# Patient Record
Sex: FEMALE | Race: BLACK OR AFRICAN AMERICAN | Employment: OTHER | URBAN - METROPOLITAN AREA
[De-identification: names, ages, dates, MRNs, and addresses within clinical notes are randomized per-mention and may not be internally consistent; named-entity substitution may affect disease eponyms.]

---

## 2017-04-13 ENCOUNTER — HOSPITAL ENCOUNTER (EMERGENCY)
Age: 52
Discharge: HOME | End: 2017-04-14
Attending: EMERGENCY MEDICINE
Payer: SELF-PAY

## 2017-04-13 ENCOUNTER — APPOINTMENT (OUTPATIENT)
Dept: GENERAL RADIOLOGY | Age: 52
End: 2017-04-13
Attending: EMERGENCY MEDICINE
Payer: SELF-PAY

## 2017-04-13 VITALS
HEART RATE: 90 BPM | SYSTOLIC BLOOD PRESSURE: 130 MMHG | RESPIRATION RATE: 16 BRPM | BODY MASS INDEX: 25.69 KG/M2 | TEMPERATURE: 98 F | WEIGHT: 145 LBS | DIASTOLIC BLOOD PRESSURE: 78 MMHG

## 2017-04-13 DIAGNOSIS — M54.40 LOW BACK PAIN WITH SCIATICA, SCIATICA LATERALITY UNSPECIFIED, UNSPECIFIED BACK PAIN LATERALITY, UNSPECIFIED CHRONICITY: Primary | ICD-10-CM

## 2017-04-13 PROCEDURE — 72110 X-RAY EXAM L-2 SPINE 4/>VWS: CPT

## 2017-04-13 PROCEDURE — 99283 EMERGENCY DEPT VISIT LOW MDM: CPT

## 2017-04-13 RX ORDER — IBUPROFEN 600 MG/1
600 TABLET ORAL
Qty: 30 TAB | Refills: 0 | Status: SHIPPED | OUTPATIENT
Start: 2017-04-13 | End: 2018-07-31

## 2017-04-13 RX ORDER — PROPRANOLOL HYDROCHLORIDE 20 MG/1
TABLET ORAL 3 TIMES DAILY
COMMUNITY
End: 2018-07-31

## 2017-04-13 RX ORDER — OXYCODONE AND ACETAMINOPHEN 5; 325 MG/1; MG/1
TABLET ORAL
Qty: 15 TAB | Refills: 0 | Status: SHIPPED | OUTPATIENT
Start: 2017-04-13 | End: 2018-07-31

## 2017-04-14 PROCEDURE — 74011250637 HC RX REV CODE- 250/637: Performed by: EMERGENCY MEDICINE

## 2017-04-14 RX ORDER — IBUPROFEN 600 MG/1
600 TABLET ORAL
Status: COMPLETED | OUTPATIENT
Start: 2017-04-14 | End: 2017-04-14

## 2017-04-14 RX ORDER — OXYCODONE AND ACETAMINOPHEN 5; 325 MG/1; MG/1
1 TABLET ORAL
Status: COMPLETED | OUTPATIENT
Start: 2017-04-14 | End: 2017-04-14

## 2017-04-14 RX ADMIN — OXYCODONE HYDROCHLORIDE AND ACETAMINOPHEN 1 TABLET: 5; 325 TABLET ORAL at 00:22

## 2017-04-14 RX ADMIN — IBUPROFEN 600 MG: 400 TABLET, FILM COATED ORAL at 00:22

## 2017-04-14 NOTE — ED TRIAGE NOTES
Patient c/o back pain x 3 days. Patient reports prior back injury and previous back surgery in 2012.

## 2017-04-14 NOTE — ED PROVIDER NOTES
HPI Comments: 10:47 PM Ranjan Mejía is a 47 y.o. female with hx of kidney stone, stroke, asthma, arthritis, degenerative disc disease, and lumbar fusion who presents to the ED c/o back pain onset yesterday after heavy lifting. Pt with hx of chronic back pain since age 14. Pt denies any chance of pregnancy, urinary sx, or any other sx at this time. The history is provided by the patient. No  was used. Past Medical History:   Diagnosis Date    Appetite loss 2013    Arthritis     in back    Asthma 2006    Back pain 8/31/2012    Chest pain     Degenerative disc disease, lumbar     DUB (dysfunctional uterine bleeding)     Foot pain, left     GERD (gastroesophageal reflux disease)     Headache     migraine variant    Irregular heartbeat     Kidney calculus     Kidney stone     Menorrhagia     Pelvic pain in female     Postlaminectomy syndrome     S/P ankle ligament repair, left 2/7/2014    Stroke ([facility] [address] 1988, 1996, 1998    x3 - no residual    Tobacco abuse     Trichomonas     UTI (lower urinary tract infection)     Wears glasses 2007       Past Surgical History:   Procedure Laterality Date    HX LUMBAR FUSION  2012    L4-L5    HX ORTHOPAEDIC Left 02/07/2014    ligament reconstruction    HX TONSILLECTOMY      HX TUBAL LIGATION  1996    x2         Family History:   Problem Relation Age of Onset    Heart Disease Mother     Hypertension Mother     Stroke Mother     Hypertension Father     Hypertension Sister     Diabetes Sister        Social History     Social History    Marital status:      Spouse name: N/A    Number of children: N/A    Years of education: N/A     Occupational History    Not on file.      Social History Main Topics    Smoking status: Current Every Day Smoker     Packs/day: 0.00     Years: 33.00     Last attempt to quit: 11/30/2013    Smokeless tobacco: Never Used    Alcohol use No    Drug use: No    Sexual activity: Yes Partners: Male     Birth control/ protection: Surgical     Other Topics Concern    Not on file     Social History Narrative         ALLERGIES: Review of patient's allergies indicates no known allergies. Review of Systems   Constitutional: Negative for chills, fatigue and fever. HENT: Negative for congestion, rhinorrhea and sore throat. Eyes: Negative for visual disturbance. Respiratory: Negative for cough and shortness of breath. Cardiovascular: Negative for chest pain and palpitations. Gastrointestinal: Negative for abdominal pain, diarrhea, nausea and vomiting. Genitourinary: Negative for dysuria, hematuria and urgency. Musculoskeletal: Positive for back pain. Negative for arthralgias and myalgias. Skin: Negative for rash and wound. Neurological: Negative for dizziness and headaches. Psychiatric/Behavioral: The patient is not nervous/anxious. All other systems reviewed and are negative. Vitals:    04/13/17 2139   BP: 130/78   Pulse: 90   Resp: 16   Temp: 98 °F (36.7 °C)   Weight: 65.8 kg (145 lb)            Physical Exam   Constitutional: She is oriented to person, place, and time. She appears well-developed and well-nourished. No distress. HENT:   Head: Normocephalic. Right Ear: External ear normal.   Left Ear: External ear normal.   Mouth/Throat: No oropharyngeal exudate. Eyes: Conjunctivae and EOM are normal. Pupils are equal, round, and reactive to light. Right eye exhibits no discharge. Left eye exhibits no discharge. No scleral icterus. Neck: Normal range of motion. Neck supple. No JVD present. No tracheal deviation present. No thyromegaly present. Cardiovascular: Normal rate, regular rhythm, normal heart sounds and intact distal pulses. Exam reveals no gallop and no friction rub. No murmur heard. Pulmonary/Chest: Effort normal and breath sounds normal. No stridor. No respiratory distress. She has no wheezes. She has no rales. She exhibits no tenderness. Abdominal: Soft. Bowel sounds are normal. She exhibits no distension and no mass. There is no tenderness. There is no rebound and no guarding. Musculoskeletal: Normal range of motion. She exhibits no edema or tenderness. Lymphadenopathy:     She has no cervical adenopathy. Neurological: She is alert and oriented to person, place, and time. She displays normal reflexes. No cranial nerve deficit. She exhibits normal muscle tone. Coordination normal.   Negative SLR bilaterally   Skin: Skin is warm and dry. No rash noted. She is not diaphoretic. No erythema. No pallor. Nursing note and vitals reviewed. MDM  Number of Diagnoses or Management Options  Low back pain with sciatica, sciatica laterality unspecified, unspecified back pain laterality, unspecified chronicity:      Amount and/or Complexity of Data Reviewed  Tests in the radiology section of CPT®: ordered and reviewed    Risk of Complications, Morbidity, and/or Mortality  Presenting problems: moderate  Diagnostic procedures: moderate  Management options: moderate    Patient Progress  Patient progress: stable    ED Course       Procedures    Vitals:  Patient Vitals for the past 12 hrs:   Temp Pulse Resp BP   04/13/17 2139 98 °F (36.7 °C) 90 16 130/78       Medications ordered:   Medications - No data to display      Lab findings:  No results found for this or any previous visit (from the past 12 hour(s)). EKG interpretation by ED Physician:      X-Ray, CT or other radiology findings or impressions:  XR SPINE LUMB MIN 4 V    (Results Pending)  No acute injury per Dr. Celsa Garza. Progress notes, Consult notes or additional Procedure notes:       Reevaluation of patient:   11:48 PM I have reevaluated the patient. Patient is feeling better. Reviewed all results with pt and pt agrees with plan for discharge and appropriate follow up. All questions answered at this time. Patient was discharged in stable condition.  Patient is to return to emergency department for any new or worsening condition. Disposition:  Diagnosis:   1. Low back pain with sciatica, sciatica laterality unspecified, unspecified back pain laterality, unspecified chronicity        Disposition: Discharged    Follow-up Information     None            Patient's Medications   Start Taking    No medications on file   Continue Taking    ALBUTEROL (PROVENTIL HFA, VENTOLIN HFA) 90 MCG/ACTUATION INHALER    Take 2 Puffs by inhalation every six (6) hours as needed. CETIRIZINE (ZYRTEC) 10 MG TABLET    Take 1 Tab by mouth daily. FLUTICASONE (FLOVENT HFA) 44 MCG/ACTUATION INHALER    Take 1 Puff by inhalation two (2) times a day. PROPRANOLOL (INDERAL) 20 MG TABLET    Take  by mouth three (3) times daily. RABEPRAZOLE (ACIPHEX) 20 MG TABLET    Take 20 mg by mouth daily. These Medications have changed    No medications on file   Stop Taking    NAPROXEN (NAPROSYN) 500 MG TABLET    Take 1 Tab by mouth two (2) times daily (with meals). PROPRANOLOL (INDERAL) 20 MG TABLET    Take 1 Tab by mouth three (3) times daily. TRAMADOL (ULTRAM) 50 MG TABLET    Take 1 Tab by mouth every eight (8) hours as needed for Pain. Max Daily Amount: 150 mg.          Scribe Attestation:   Indra Meza acting as a scribe for and in the presence of Sudeep Child MD April 13, 2017 at 10:43 PM     Signed by: Phillip Lawrence, April 13, 2017, 10:43 PM    Provider Attestation:   I personally performed the services described in the documentation, reviewed the documentation, as recorded by the scribe in my presence, and it accurately and completely records my words and actions.      Reviewed and signed by:  Sudeep Child MD

## 2017-04-14 NOTE — DISCHARGE INSTRUCTIONS
Back Pain: Care Instructions  Your Care Instructions    Back pain has many possible causes. It is often related to problems with muscles and ligaments of the back. It may also be related to problems with the nerves, discs, or bones of the back. Moving, lifting, standing, sitting, or sleeping in an awkward way can strain the back. Sometimes you don't notice the injury until later. Arthritis is another common cause of back pain. Although it may hurt a lot, back pain usually improves on its own within several weeks. Most people recover in 12 weeks or less. Using good home treatment and being careful not to stress your back can help you feel better sooner. Follow-up care is a key part of your treatment and safety. Be sure to make and go to all appointments, and call your doctor if you are having problems. Its also a good idea to know your test results and keep a list of the medicines you take. How can you care for yourself at home? · Sit or lie in positions that are most comfortable and reduce your pain. Try one of these positions when you lie down:  ¨ Lie on your back with your knees bent and supported by large pillows. ¨ Lie on the floor with your legs on the seat of a sofa or chair. Juan Manuel Rosey on your side with your knees and hips bent and a pillow between your legs. ¨ Lie on your stomach if it does not make pain worse. · Do not sit up in bed, and avoid soft couches and twisted positions. Bed rest can help relieve pain at first, but it delays healing. Avoid bed rest after the first day of back pain. · Change positions every 30 minutes. If you must sit for long periods of time, take breaks from sitting. Get up and walk around, or lie in a comfortable position. · Try using a heating pad on a low or medium setting for 15 to 20 minutes every 2 or 3 hours. Try a warm shower in place of one session with the heating pad. · You can also try an ice pack for 10 to 15 minutes every 2 to 3 hours.  Put a thin cloth between the ice pack and your skin. · Take pain medicines exactly as directed. ¨ If the doctor gave you a prescription medicine for pain, take it as prescribed. ¨ If you are not taking a prescription pain medicine, ask your doctor if you can take an over-the-counter medicine. · Take short walks several times a day. You can start with 5 to 10 minutes, 3 or 4 times a day, and work up to longer walks. Walk on level surfaces and avoid hills and stairs until your back is better. · Return to work and other activities as soon as you can. Continued rest without activity is usually not good for your back. · To prevent future back pain, do exercises to stretch and strengthen your back and stomach. Learn how to use good posture, safe lifting techniques, and proper body mechanics. When should you call for help? Call your doctor now or seek immediate medical care if:  · You have new or worsening numbness in your legs. · You have new or worsening weakness in your legs. (This could make it hard to stand up.)  · You lose control of your bladder or bowels. Watch closely for changes in your health, and be sure to contact your doctor if:  · Your pain gets worse. · You are not getting better after 2 weeks. Where can you learn more? Go to http://ilsa-bolivar.info/. Enter P006 in the search box to learn more about \"Back Pain: Care Instructions. \"  Current as of: May 23, 2016  Content Version: 11.2  © 8886-4465 [employer]. Care instructions adapted under license by [employer] (which disclaims liability or warranty for this information). If you have questions about a medical condition or this instruction, always ask your healthcare professional. [address] any warranty or liability for your use of this information.

## 2017-07-26 ENCOUNTER — HOSPITAL ENCOUNTER (EMERGENCY)
Age: 52
Discharge: HOME | End: 2017-07-26
Attending: EMERGENCY MEDICINE
Payer: SELF-PAY

## 2017-07-26 VITALS
DIASTOLIC BLOOD PRESSURE: 91 MMHG | OXYGEN SATURATION: 100 % | HEART RATE: 95 BPM | SYSTOLIC BLOOD PRESSURE: 138 MMHG | WEIGHT: 150 LBS | TEMPERATURE: 98.1 F | HEIGHT: 63 IN | RESPIRATION RATE: 18 BRPM | BODY MASS INDEX: 26.58 KG/M2

## 2017-07-26 DIAGNOSIS — R10.84 ABDOMINAL PAIN, GENERALIZED: Primary | ICD-10-CM

## 2017-07-26 LAB
ANION GAP BLD CALC-SCNC: 7 MMOL/L (ref 3–18)
BASOPHILS # BLD AUTO: 0 K/UL (ref 0–0.1)
BASOPHILS # BLD: 0 % (ref 0–2)
BUN SERPL-MCNC: 12 MG/DL (ref 7–18)
BUN/CREAT SERPL: 17 (ref 12–20)
CALCIUM SERPL-MCNC: 9.4 MG/DL (ref 8.5–10.1)
CHLORIDE SERPL-SCNC: 109 MMOL/L (ref 100–108)
CO2 SERPL-SCNC: 24 MMOL/L (ref 21–32)
CREAT SERPL-MCNC: 0.69 MG/DL (ref 0.6–1.3)
DIFFERENTIAL METHOD BLD: NORMAL
EOSINOPHIL # BLD: 0.1 K/UL (ref 0–0.4)
EOSINOPHIL NFR BLD: 2 % (ref 0–5)
ERYTHROCYTE [DISTWIDTH] IN BLOOD BY AUTOMATED COUNT: 13.8 % (ref 11.6–14.5)
GLUCOSE SERPL-MCNC: 105 MG/DL (ref 74–99)
HCT VFR BLD AUTO: 42.8 % (ref 35–45)
HGB BLD-MCNC: 14.7 G/DL (ref 12–16)
LYMPHOCYTES # BLD AUTO: 30 % (ref 21–52)
LYMPHOCYTES # BLD: 2.5 K/UL (ref 0.9–3.6)
MCH RBC QN AUTO: 31.7 PG (ref 24–34)
MCHC RBC AUTO-ENTMCNC: 34.3 G/DL (ref 31–37)
MCV RBC AUTO: 92.4 FL (ref 74–97)
MONOCYTES # BLD: 0.5 K/UL (ref 0.05–1.2)
MONOCYTES NFR BLD AUTO: 6 % (ref 3–10)
NEUTS SEG # BLD: 5.2 K/UL (ref 1.8–8)
NEUTS SEG NFR BLD AUTO: 62 % (ref 40–73)
PLATELET # BLD AUTO: 185 K/UL (ref 135–420)
PMV BLD AUTO: 10.4 FL (ref 9.2–11.8)
POTASSIUM SERPL-SCNC: 3.5 MMOL/L (ref 3.5–5.5)
RBC # BLD AUTO: 4.63 M/UL (ref 4.2–5.3)
SODIUM SERPL-SCNC: 140 MMOL/L (ref 136–145)
WBC # BLD AUTO: 8.2 K/UL (ref 4.6–13.2)

## 2017-07-26 PROCEDURE — 85025 COMPLETE CBC W/AUTO DIFF WBC: CPT | Performed by: PHYSICIAN ASSISTANT

## 2017-07-26 PROCEDURE — 99283 EMERGENCY DEPT VISIT LOW MDM: CPT

## 2017-07-26 PROCEDURE — 80048 BASIC METABOLIC PNL TOTAL CA: CPT | Performed by: PHYSICIAN ASSISTANT

## 2017-07-26 PROCEDURE — 74011250637 HC RX REV CODE- 250/637: Performed by: PHYSICIAN ASSISTANT

## 2017-07-26 RX ORDER — HYDROCODONE BITARTRATE AND ACETAMINOPHEN 5; 325 MG/1; MG/1
1 TABLET ORAL
Status: COMPLETED | OUTPATIENT
Start: 2017-07-26 | End: 2017-07-26

## 2017-07-26 RX ADMIN — HYDROCODONE BITARTRATE AND ACETAMINOPHEN 1 TABLET: 5; 325 TABLET ORAL at 12:26

## 2017-07-26 NOTE — DISCHARGE INSTRUCTIONS

## 2017-07-26 NOTE — ED TRIAGE NOTES
C/o abdominal pain X 2 days. Denies N/V.  States \" I had diarrhea on Monday and I took Imodium for it, but my stomach still hurts\"

## 2017-07-26 NOTE — ED PROVIDER NOTES
HPI Comments: 11:27 AM Lizbet Bradshaw is a 47 y.o. female with h/o GERD, UTI, kidney stone, and stroke who presents to ED complaining of sharp, diffuse abdominal pain onset 2 days ago. Patient states she had diarrhea 2 days ago, but has not had any since then. She took Imodium for her diarrhea, but came to ED for persisting abdominal pain. Patient has not taken anything for her pain. No other concerns or symptoms at this time. PCP: Evan Camarena NP    The history is provided by the patient. Past Medical History:   Diagnosis Date    Appetite loss 2013    Arthritis     in back    Asthma 2006    Back pain 8/31/2012    Chest pain     Degenerative disc disease, lumbar     DUB (dysfunctional uterine bleeding)     Foot pain, left     GERD (gastroesophageal reflux disease)     Headache     migraine variant    Irregular heartbeat     Kidney calculus     Kidney stone     Menorrhagia     Pelvic pain in female     Postlaminectomy syndrome     S/P ankle ligament repair, left 2/7/2014    Stroke ([facility] [address] 1988, 1996, 1998    x3 - no residual    Tobacco abuse     Trichomonas     UTI (lower urinary tract infection)     Wears glasses 2007       Past Surgical History:   Procedure Laterality Date    HX LUMBAR FUSION  2012    L4-L5    HX ORTHOPAEDIC Left 02/07/2014    ligament reconstruction    HX TONSILLECTOMY      HX TUBAL LIGATION  1996    x2         Family History:   Problem Relation Age of Onset    Heart Disease Mother     Hypertension Mother     Stroke Mother     Hypertension Father     Hypertension Sister     Diabetes Sister        Social History     Social History    Marital status:      Spouse name: N/A    Number of children: N/A    Years of education: N/A     Occupational History    Not on file.      Social History Main Topics    Smoking status: Current Every Day Smoker     Packs/day: 0.00     Years: 33.00     Last attempt to quit: 11/30/2013    Smokeless tobacco: Never Used  Alcohol use No    Drug use: No    Sexual activity: Yes     Partners: Male     Birth control/ protection: Surgical     Other Topics Concern    Not on file     Social History Narrative         ALLERGIES: Review of patient's allergies indicates no known allergies. Review of Systems   Constitutional: Negative for chills and fever. HENT: Negative for congestion, rhinorrhea and sore throat. Respiratory: Negative for cough and shortness of breath. Cardiovascular: Negative for chest pain and leg swelling. Gastrointestinal: Positive for abdominal pain and diarrhea. Negative for nausea and vomiting. Genitourinary: Negative for dysuria and frequency. Musculoskeletal: Negative for arthralgias, back pain and myalgias. Skin: Negative for rash and wound. Neurological: Negative for dizziness, numbness and headaches. All other systems reviewed and are negative. Vitals:    07/26/17 1034 07/26/17 1116   BP: (!) 138/91    Pulse: 95    Resp: 18    Temp: 98.1 °F (36.7 °C)    SpO2: 100%    Weight:  68 kg (150 lb)   Height:  5' 3\" (1.6 m)            Physical Exam   Constitutional: She is oriented to person, place, and time. She appears well-developed and well-nourished. No distress. HENT:   Head: Normocephalic and atraumatic. Eyes: Conjunctivae are normal.   Neck: Normal range of motion. Neck supple. Cardiovascular: Normal rate, regular rhythm and normal heart sounds. Pulmonary/Chest: Effort normal and breath sounds normal. No respiratory distress. She has no wheezes. She has no rales. Abdominal: Soft. She exhibits no distension. There is no tenderness. There is no rebound and no guarding. Musculoskeletal: Normal range of motion. Neurological: She is alert and oriented to person, place, and time. Skin: Skin is warm and dry. Psychiatric: She has a normal mood and affect. Her behavior is normal. Judgment and thought content normal.   Nursing note and vitals reviewed.        MDM  Number of Diagnoses or Management Options  Abdominal pain, generalized:     ED Course       Procedures    Vitals:  Patient Vitals for the past 12 hrs:   Temp Pulse Resp BP SpO2   07/26/17 1034 98.1 °F (36.7 °C) 95 18 (!) 138/91 100 %   SpO2 reviewed and within normal limits. Medications ordered:   Medications - No data to display      Lab findings:  No results found for this or any previous visit (from the past 12 hour(s)). EKG interpretation by ED Physician:       X-Ray, CT or other radiology findings or impressions:  No results found. Orders:  No orders of the defined types were placed in this encounter. Reevaluation, Progress notes, Consult notes, or additional Procedure notes:       Disposition:  Diagnosis: No diagnosis found. Disposition:     Follow-up Information     None           Patient's Medications   Start Taking    No medications on file   Continue Taking    ALBUTEROL (PROVENTIL HFA, VENTOLIN HFA) 90 MCG/ACTUATION INHALER    Take 2 Puffs by inhalation every six (6) hours as needed. CETIRIZINE (ZYRTEC) 10 MG TABLET    Take 1 Tab by mouth daily. FLUTICASONE (FLOVENT HFA) 44 MCG/ACTUATION INHALER    Take 1 Puff by inhalation two (2) times a day. IBUPROFEN (MOTRIN) 600 MG TABLET    Take 1 Tab by mouth every six (6) hours as needed for Pain. OXYCODONE-ACETAMINOPHEN (PERCOCET) 5-325 MG PER TABLET    Take 1 tablet every 4-6 hours as needed for pain control. If you were instructed to try over the counter ibuprofen or tylenol, only take the percocet for pain not controlled with the over the counter medication. PROPRANOLOL (INDERAL) 20 MG TABLET    Take  by mouth three (3) times daily. RABEPRAZOLE (ACIPHEX) 20 MG TABLET    Take 20 mg by mouth daily.      These Medications have changed    No medications on file   Stop Taking    No medications on file         [id number] [facility] for and in the presence of Nabil Hernandez, [address] Lory Kingsley (07/26/17)      Physician Attestation  I personally performed the services described in this documentation, reviewed and edited the documentation which was dictated to the scribe in my presence, and it accurately records my words and actions. Dakota Schreiber (07/26/17)      Signed by: Phillip Fisher, July 26, 2017 at 11:27 AM        -------------------------------------------------------------------------------------------------------------------     EKG INTERPRETATIONS:      RADIOLOGY RESULTS:   No orders to display       LABORATORY RESULTS:  Recent Results (from the past 12 hour(s))   CBC WITH AUTOMATED DIFF    Collection Time: 07/26/17 12:20 PM   Result Value Ref Range    WBC 8.2 4.6 - 13.2 K/uL    RBC 4.63 4.20 - 5.30 M/uL    HGB 14.7 12.0 - 16.0 g/dL    HCT 42.8 35.0 - 45.0 %    MCV 92.4 74.0 - 97.0 FL    MCH 31.7 24.0 - 34.0 PG    MCHC 34.3 31.0 - 37.0 g/dL    RDW 13.8 11.6 - 14.5 %    PLATELET 564 288 - 045 K/uL    MPV 10.4 9.2 - 11.8 FL    NEUTROPHILS 62 40 - 73 %    LYMPHOCYTES 30 21 - 52 %    MONOCYTES 6 3 - 10 %    EOSINOPHILS 2 0 - 5 %    BASOPHILS 0 0 - 2 %    ABS. NEUTROPHILS 5.2 1.8 - 8.0 K/UL    ABS. LYMPHOCYTES 2.5 0.9 - 3.6 K/UL    ABS. MONOCYTES 0.5 0.05 - 1.2 K/UL    ABS. EOSINOPHILS 0.1 0.0 - 0.4 K/UL    ABS. BASOPHILS 0.0 0.0 - 0.1 K/UL    DF AUTOMATED     METABOLIC PANEL, BASIC    Collection Time: 07/26/17 12:20 PM   Result Value Ref Range    Sodium 140 136 - 145 mmol/L    Potassium 3.5 3.5 - 5.5 mmol/L    Chloride 109 (H) 100 - 108 mmol/L    CO2 24 21 - 32 mmol/L    Anion gap 7 3.0 - 18 mmol/L    Glucose 105 (H) 74 - 99 mg/dL    BUN 12 7.0 - 18 MG/DL    Creatinine 0.69 0.6 - 1.3 MG/DL    BUN/Creatinine ratio 17 12 - 20      GFR est AA >60 >60 ml/min/1.73m2    GFR est non-AA >60 >60 ml/min/1.73m2    Calcium 9.4 8.5 - 10.1 MG/DL           CONSULTATIONS:        PROGRESS NOTES:    1:48 PM Pt well appearing and in NAD. No abdominal tenderness noted on my exam. Labs unremarkable.  Will d/h to f/u with PCP For further eval. Lengthy D/W pt regarding possible worsening of pt's condition, need for follow up and strict ED return instructions for any worsening symptoms. DISPOSITION:  ED DIAGNOSIS & DISPOSITION INFORMATION  Diagnosis:   1. Abdominal pain, generalized          Disposition: home    Follow-up Information     Follow up With Details Comments [address], NP  For further evaluation [address] Humph[address]      [facility] EMERGENCY DEPT  Immediately if symptoms worsen [address] [facility] [id number]  [phone]          Patient's Medications   Start Taking    No medications on file   Continue Taking    ALBUTEROL (PROVENTIL HFA, VENTOLIN HFA) 90 MCG/ACTUATION INHALER    Take 2 Puffs by inhalation every six (6) hours as needed. CETIRIZINE (ZYRTEC) 10 MG TABLET    Take 1 Tab by mouth daily. FLUTICASONE (FLOVENT HFA) 44 MCG/ACTUATION INHALER    Take 1 Puff by inhalation two (2) times a day. IBUPROFEN (MOTRIN) 600 MG TABLET    Take 1 Tab by mouth every six (6) hours as needed for Pain. OXYCODONE-ACETAMINOPHEN (PERCOCET) 5-325 MG PER TABLET    Take 1 tablet every 4-6 hours as needed for pain control. If you were instructed to try over the counter ibuprofen or tylenol, only take the percocet for pain not controlled with the over the counter medication. PROPRANOLOL (INDERAL) 20 MG TABLET    Take  by mouth three (3) times daily. RABEPRAZOLE (ACIPHEX) 20 MG TABLET    Take 20 mg by mouth daily.      These Medications have changed    No medications on file   Stop Taking    No medications on file

## 2018-07-31 ENCOUNTER — HOSPITAL ENCOUNTER (EMERGENCY)
Age: 53
Discharge: HOME | End: 2018-07-31
Attending: EMERGENCY MEDICINE
Payer: SELF-PAY

## 2018-07-31 VITALS
DIASTOLIC BLOOD PRESSURE: 79 MMHG | SYSTOLIC BLOOD PRESSURE: 125 MMHG | RESPIRATION RATE: 20 BRPM | OXYGEN SATURATION: 97 % | BODY MASS INDEX: 23.56 KG/M2 | WEIGHT: 138 LBS | TEMPERATURE: 99.6 F | HEART RATE: 100 BPM | HEIGHT: 64 IN

## 2018-07-31 DIAGNOSIS — J02.9 SORE THROAT: Primary | ICD-10-CM

## 2018-07-31 PROCEDURE — 99282 EMERGENCY DEPT VISIT SF MDM: CPT

## 2018-07-31 RX ORDER — PENICILLIN V POTASSIUM 500 MG/1
500 TABLET, FILM COATED ORAL 4 TIMES DAILY
Qty: 40 TAB | Refills: 0 | Status: SHIPPED | OUTPATIENT
Start: 2018-07-31 | End: 2018-08-10

## 2018-07-31 NOTE — ED TRIAGE NOTES
Patient states eating sunflower seeds yesterday when some became lodged in her throat. C/o difficulty swallowing. States prior hx of stroke with dysphagia. She states onset of left ear pain today.

## 2018-07-31 NOTE — ED NOTES
Patient c/o left ear pain. She also c/o sore throat after eating sunflower seeds yesterday. Noted able to swallow secretions and admits to eating since and after eating sunflower seeds.

## 2018-07-31 NOTE — ED PROVIDER NOTES
EMERGENCY DEPARTMENT HISTORY AND PHYSICAL EXAM    7:15 PM      Date: 7/31/2018  Patient Name: Errol Simon    History of Presenting Illness     Chief Complaint   Patient presents with    Foreign Body Swallowed         History Provided By: Patient    Chief Complaint: Sore throat  Duration:  Days  Timing:  Acute and Constant  Location: Throat  Quality: Sore  Severity: Moderate  Modifying Factors: Swallowing and yawning  Associated Symptoms: Left ear pain      Additional History (Context): Errol Simon is a 46 y.o. female smoker with a hx of a CVA with chronic mild dysphagia and asthma who presents with complaints of sore throat that started yesterday. She notes associated left ear pain. The pain is worsened when swallowing and yawning. She has tried applying sweet oil and a cotton ball to her left ear. She admits to occasional alcohol use. She denies sick contacts, fever, hx of HTN, hx of HLD, drug use, cough N/V/D, and any further complaints. PCP: To Eli NP    Current Outpatient Prescriptions   Medication Sig Dispense Refill    penicillin v potassium (VEETID) 500 mg tablet Take 1 Tab by mouth four (4) times daily for 10 days. 40 Tab 0    magic mouthwash (ALINA) susp Take 5 mL by mouth every four (4) hours as needed. 120 mL 0    albuterol (PROVENTIL HFA, VENTOLIN HFA) 90 mcg/actuation inhaler Take 2 Puffs by inhalation every six (6) hours as needed.  fluticasone (FLOVENT HFA) 44 mcg/actuation inhaler Take 1 Puff by inhalation two (2) times a day.            Past History     Past Medical History:  Past Medical History:   Diagnosis Date    Appetite loss 2013    Arthritis     in back    Asthma 2006    Back pain 8/31/2012    Chest pain     Degenerative disc disease, lumbar     DUB (dysfunctional uterine bleeding)     Foot pain, left     GERD (gastroesophageal reflux disease)     Headache(784.0)     migraine variant    Irregular heartbeat     Kidney calculus     Kidney stone     Menorrhagia     Pelvic pain in female     Postlaminectomy syndrome     S/P ankle ligament repair, left 2/7/2014    Stroke ([facility] [address] 1988, 1996, 1998    x3 - no residual    Tobacco abuse     Trichomonas     UTI (lower urinary tract infection)     Wears glasses 2007       Past Surgical History:  Past Surgical History:   Procedure Laterality Date    HX LUMBAR FUSION  2012    L4-L5    HX ORTHOPAEDIC Left 02/07/2014    ligament reconstruction    HX TONSILLECTOMY      HX TUBAL LIGATION  1996    x2       Family History:  Family History   Problem Relation Age of Onset    Heart Disease Mother     Hypertension Mother    [facility] Stroke Mother     Hypertension Father     Hypertension Sister     Diabetes Sister        Social History:  Social History   Substance Use Topics    Smoking status: Current Every Day Smoker     Packs/day: 0.00     Years: 33.00     Last attempt to quit: 11/30/2013    Smokeless tobacco: Never Used    Alcohol use No       Allergies:  No Known Allergies      Review of Systems     Review of Systems   Constitutional: Negative. Negative for chills, diaphoresis and fever. HENT: Positive for ear pain and sore throat. Negative for congestion and rhinorrhea. Eyes: Negative. Negative for pain, discharge and redness. Respiratory: Negative. Negative for cough, chest tightness, shortness of breath and wheezing. Cardiovascular: Negative. Negative for chest pain. Gastrointestinal: Negative. Negative for abdominal pain, constipation, diarrhea, nausea and vomiting. Genitourinary: Negative. Negative for dysuria, flank pain, frequency, hematuria and urgency. Musculoskeletal: Negative. Negative for back pain and neck pain. Skin: Negative. Negative for rash. Neurological: Negative. Negative for syncope, weakness, numbness and headaches. Psychiatric/Behavioral: Negative. All other systems reviewed and are negative.         Physical Exam     Visit Vitals    /79 (BP 1 Location: Left arm, BP Patient Position: At rest)    Pulse 100    Temp 99.6 °F (37.6 °C)    Resp 20    Ht 5' 3.5\" (1.613 m)    Wt 62.6 kg (138 lb)    LMP 04/26/2015    SpO2 97%    BMI 24.06 kg/m2       Physical Exam   Constitutional: She appears well-developed and well-nourished. Non-toxic appearance. She does not have a sickly appearance. She does not appear ill. No distress. HENT:   Head: Normocephalic and atraumatic. Right Ear: Hearing and ear canal normal.   Left Ear: Hearing and ear canal normal.   Ears:    Mouth/Throat: Uvula is midline and mucous membranes are normal. Posterior oropharyngeal erythema present. No oropharyngeal exudate, posterior oropharyngeal edema or tonsillar abscesses. Using cotton on left ear    Eyes: Conjunctivae and EOM are normal. Pupils are equal, round, and reactive to light. No scleral icterus. Neck: Normal range of motion. Neck supple. No hepatojugular reflux and no JVD present. No tracheal deviation present. No thyromegaly present. Cardiovascular: Normal rate, regular rhythm, S1 normal, S2 normal, normal heart sounds, intact distal pulses and normal pulses. Exam reveals no gallop, no S3 and no S4. No murmur heard. Pulses:       Radial pulses are 2+ on the right side, and 2+ on the left side. Dorsalis pedis pulses are 2+ on the right side, and 2+ on the left side. Pulmonary/Chest: Effort normal and breath sounds normal. No respiratory distress. She has no decreased breath sounds. She has no wheezes. She has no rhonchi. She has no rales. Abdominal: Soft. Normal appearance and bowel sounds are normal. She exhibits no distension and no mass. There is no hepatosplenomegaly. There is no tenderness. There is no rigidity, no rebound, no guarding, no CVA tenderness, no tenderness at McBurney's point and negative Bolaños's sign. Musculoskeletal: Normal range of motion.    Strength 5/5 throughout    Lymphadenopathy:        Head (right side): No submental, no submandibular, no preauricular and no occipital adenopathy present. Head (left side): No submental, no submandibular, no preauricular and no occipital adenopathy present. She has no cervical adenopathy. Right: No supraclavicular adenopathy present. Left: No supraclavicular adenopathy present. Neurological: She is alert. She has normal strength and normal reflexes. She is not disoriented. No cranial nerve deficit or sensory deficit. Coordination and gait normal. GCS eye subscore is 4. GCS verbal subscore is 5. GCS motor subscore is 6. Grossly intact    Skin: Skin is warm, dry and intact. No rash noted. She is not diaphoretic. Psychiatric: She has a normal mood and affect. Her speech is normal and behavior is normal. Judgment and thought content normal. Cognition and memory are normal.   Nursing note and vitals reviewed. Diagnostic Study Results     Labs -  No results found for this or any previous visit (from the past 12 hour(s)). Radiologic Studies -   No orders to display         Medical Decision Making   Provider Notes (Medical Decision Making):  MDM  Number of Diagnoses or Management Options  Sore throat:   Diagnosis management comments: Strep throat  Viral pharyngitis         I am the first provider for this patient. I reviewed the vital signs, available nursing notes, past medical history, past surgical history, family history and social history. Vital Signs-Reviewed the patient's vital signs. Records Reviewed: Nursing Notes and Old Medical Records (Time of Review: 7:15 PM)    ED Course: Progress Notes, Reevaluation, and Consults:      Diagnosis       I have reassessed the patient. Patient is feeling the same. Patient will be prescribed Veetid. Patient was discharged in stable condition. Patient is to return to emergency department if any new or worsening condition. Clinical Impression:   1.  Sore throat        Disposition: Discharge    Follow-up Information     Follow up With Details Comments [address], NP Call in 1 day  [address] EMERGENCY DEPT  As needed, If symptoms worsen [address] [facility]  [phone]           _______________________________    Attestations:  Scribe [address] [place] [address] acting as a scribe for and in the presence of Bailee Escamilla DO      July 31, 2018 at 8:00 PM       Provider Attestation:      I personally performed the services described in the documentation, reviewed the documentation, as recorded by the scribe in my presence, and it accurately and completely records my words and actions.  July 31, 2018 at 8:00 PM - Bailee Escamilla DO    _______________________________

## 2018-08-01 NOTE — DISCHARGE INSTRUCTIONS
Sore Throat: Care Instructions  Your Care Instructions    Infection by bacteria or a virus causes most sore throats. Cigarette smoke, dry air, air pollution, allergies, and yelling can also cause a sore throat. Sore throats can be painful and annoying. Fortunately, most sore throats go away on their own. If you have a bacterial infection, your doctor may prescribe antibiotics. Follow-up care is a key part of your treatment and safety. Be sure to make and go to all appointments, and call your doctor if you are having problems. It's also a good idea to know your test results and keep a list of the medicines you take. How can you care for yourself at home? · If your doctor prescribed antibiotics, take them as directed. Do not stop taking them just because you feel better. You need to take the full course of antibiotics. · Gargle with warm salt water once an hour to help reduce swelling and relieve discomfort. Use 1 teaspoon of salt mixed in 1 cup of warm water. · Take an over-the-counter pain medicine, such as acetaminophen (Tylenol), ibuprofen (Advil, Motrin), or naproxen (Aleve). Read and follow all instructions on the label. · Be careful when taking over-the-counter cold or flu medicines and Tylenol at the same time. Many of these medicines have acetaminophen, which is Tylenol. Read the labels to make sure that you are not taking more than the recommended dose. Too much acetaminophen (Tylenol) can be harmful. · Drink plenty of fluids. Fluids may help soothe an irritated throat. Hot fluids, such as tea or soup, may help decrease throat pain. · Use over-the-counter throat lozenges to soothe pain. Regular cough drops or hard candy may also help. These should not be given to young children because of the risk of choking. · Do not smoke or allow others to smoke around you. If you need help quitting, talk to your doctor about stop-smoking programs and medicines.  These can increase your chances of quitting for good. · Use a vaporizer or humidifier to add moisture to your bedroom. Follow the directions for cleaning the machine. When should you call for help? Call your doctor now or seek immediate medical care if:    · You have new or worse trouble swallowing.     · Your sore throat gets much worse on one side.    Watch closely for changes in your health, and be sure to contact your doctor if you do not get better as expected. Where can you learn more? Go to http://ilsa-bolivar.info/. Enter [phone] [id number] in the search box to learn more about \"Sore Throat: Care Instructions. \"  Current as of: May 12, 2017  Content Version: 11.7  © 4346-1958 [employer]. Care instructions adapted under license by [employer] (which disclaims liability or warranty for this information). If you have questions about a medical condition or this instruction, always ask your healthcare professional. [address] any warranty or liability for your use of this information.

## 2018-12-08 ENCOUNTER — HOSPITAL ENCOUNTER (EMERGENCY)
Age: 53
Discharge: HOME | End: 2018-12-08
Attending: EMERGENCY MEDICINE
Payer: SELF-PAY

## 2018-12-08 VITALS
OXYGEN SATURATION: 99 % | SYSTOLIC BLOOD PRESSURE: 129 MMHG | WEIGHT: 130 LBS | DIASTOLIC BLOOD PRESSURE: 77 MMHG | TEMPERATURE: 98.2 F | RESPIRATION RATE: 17 BRPM | HEART RATE: 98 BPM | BODY MASS INDEX: 22.67 KG/M2

## 2018-12-08 DIAGNOSIS — K04.7 DENTAL ABSCESS: Primary | ICD-10-CM

## 2018-12-08 PROCEDURE — 99282 EMERGENCY DEPT VISIT SF MDM: CPT

## 2018-12-08 RX ORDER — HYDROCODONE BITARTRATE AND ACETAMINOPHEN 5; 325 MG/1; MG/1
TABLET ORAL
Qty: 6 TAB | Refills: 0 | Status: SHIPPED | OUTPATIENT
Start: 2018-12-08 | End: 2019-01-20

## 2018-12-08 RX ORDER — PENICILLIN V POTASSIUM 500 MG/1
500 TABLET, FILM COATED ORAL 4 TIMES DAILY
Qty: 40 TAB | Refills: 0 | Status: SHIPPED | OUTPATIENT
Start: 2018-12-08 | End: 2018-12-18

## 2018-12-08 RX ORDER — CLINDAMYCIN HYDROCHLORIDE 150 MG/1
300 CAPSULE ORAL 4 TIMES DAILY
Qty: 80 CAP | Refills: 0 | Status: SHIPPED | OUTPATIENT
Start: 2018-12-08 | End: 2018-12-18

## 2018-12-08 RX ORDER — IBUPROFEN 800 MG/1
800 TABLET ORAL EVERY 8 HOURS
Qty: 15 TAB | Refills: 0 | Status: SHIPPED | OUTPATIENT
Start: 2018-12-08 | End: 2018-12-13

## 2018-12-08 NOTE — ED NOTES
Discharge instructions reviewed with pt. Pt voiced understanding . Pt instructed not to ride scooter while on narcotic.   Pt  Stated she understood

## 2018-12-08 NOTE — ED PROVIDER NOTES
EMERGENCY DEPARTMENT HISTORY AND PHYSICAL EXAM    Date: 12/8/2018  Patient Name: Aroldo Lamb    History of Presenting Illness     No chief complaint on file. History Provided By: Patient    Chief Complaint: abscess  Duration: few Days  Timing:  Acute  Location: upper right  Quality: Aching  Severity: Moderate  Modifying Factors: none  Associated Symptoms: denies any other associated signs or symptoms      Additional History (Context): Aroldo Lamb is a 46 y.o. female with stroke who presents with right upper cental pain and facial swelling for past few days. Dental extraction incomplete to area affected. Denies fever, drooling. No longer has menses. PCP: Katelin Monterroso NP    Current Outpatient Medications   Medication Sig Dispense Refill    ibuprofen (MOTRIN) 800 mg tablet Take 1 Tab by mouth every eight (8) hours for 5 days. 15 Tab 0    HYDROcodone-acetaminophen (NORCO) 5-325 mg per tablet Take 1-2 tablets PO every 4-6 hours as needed for pain control. If over the counter ibuprofen or acetaminophen was suggested, then only take the vicodin for pain not well controlled with the over the counter medication. 6 Tab 0    penicillin v potassium (VEETID) 500 mg tablet Take 1 Tab by mouth four (4) times daily for 10 days. 40 Tab 0    clindamycin (CLEOCIN) 150 mg capsule Take 2 Caps by mouth four (4) times daily for 10 days. 80 Cap 0    magic mouthwash (ALINA) susp Take 5 mL by mouth every four (4) hours as needed. 120 mL 0    albuterol (PROVENTIL HFA, VENTOLIN HFA) 90 mcg/actuation inhaler Take 2 Puffs by inhalation every six (6) hours as needed.  fluticasone (FLOVENT HFA) 44 mcg/actuation inhaler Take 1 Puff by inhalation two (2) times a day.            Past History     Past Medical History:  Past Medical History:   Diagnosis Date    Appetite loss 2013    Arthritis     in back    Asthma 2006    Back pain 8/31/2012    Chest pain     Degenerative disc disease, lumbar     DUB (dysfunctional uterine bleeding)     Foot pain, left     GERD (gastroesophageal reflux disease)     Headache(784.0)     migraine variant    Irregular heartbeat     Kidney calculus     Kidney stone     Menorrhagia     Pelvic pain in female     Postlaminectomy syndrome     S/P ankle ligament repair, left 2014    Stroke ([address] , , 1998    x3 - no residual    Tobacco abuse     Trichomonas     UTI (lower urinary tract infection)     Wears glasses        Past Surgical History:  Past Surgical History:   Procedure Laterality Date    HX LUMBAR FUSION  2012    L4-L5    HX ORTHOPAEDIC Left 2014    ligament reconstruction    HX TONSILLECTOMY      HX TUBAL LIGATION  1996    x2       Family History:  Family History   Problem Relation Age of Onset    Heart Disease Mother     Hypertension Mother     Stroke Mother     Hypertension Father     Hypertension Sister     Diabetes Sister        Social History:  Social History     Tobacco Use    Smoking status: Current Every Day Smoker     Packs/day: 0.00     Years: 33.00     Pack years: 0.00     Last attempt to quit: 2013     Years since quittin.0    Smokeless tobacco: Never Used   Substance Use Topics    Alcohol use: No     Alcohol/week: 0.0 oz    Drug use: No       Allergies:  No Known Allergies      Review of Systems   Review of Systems   Constitutional: Negative for fever. HENT: Positive for dental problem. Negative for drooling. All other systems reviewed and are negative. All Other Systems Negative  Physical Exam     Vitals:    18 0829   BP: 129/77   Pulse: 98   Resp: 17   Temp: 98.2 °F (36.8 °C)   SpO2: 99%   Weight: 59 kg (130 lb)     Physical Exam   Constitutional: She is oriented to person, place, and time. She appears well-developed. HENT:   Head: Normocephalic and atraumatic. Dental: gingival abscess above upper right central incisor, tender. No drooling, trismus.    Eyes: Pupils are equal, round, and reactive to light.   Neck: No JVD present. No tracheal deviation present. No thyromegaly present. Cardiovascular: Normal rate, regular rhythm and normal heart sounds. Exam reveals no gallop and no friction rub. No murmur heard. Pulmonary/Chest: Effort normal and breath sounds normal. No stridor. No respiratory distress. She has no wheezes. She has no rales. She exhibits no tenderness. Abdominal: Soft. She exhibits no distension and no mass. There is no tenderness. There is no rebound and no guarding. Musculoskeletal: She exhibits no edema or tenderness. Lymphadenopathy:     She has no cervical adenopathy. Neurological: She is alert and oriented to person, place, and time. Skin: Skin is warm and dry. No rash noted. No erythema. No pallor. Psychiatric: She has a normal mood and affect. Her behavior is normal. Thought content normal.   Nursing note and vitals reviewed. Diagnostic Study Results     Labs -   No results found for this or any previous visit (from the past 12 hour(s)). Radiologic Studies -   No orders to display     CT Results  (Last 48 hours)    None        CXR Results  (Last 48 hours)    None            Medical Decision Making   I am the first provider for this patient. I reviewed the vital signs, available nursing notes, past medical history, past surgical history, family history and social history. Vital Signs-Reviewed the patient's vital signs. Records Reviewed: Nursing Notes    Procedures:  Procedures    Provider Notes (Medical Decision Making): treat abscess. MED RECONCILIATION:  No current facility-administered medications for this encounter. Current Outpatient Medications   Medication Sig    ibuprofen (MOTRIN) 800 mg tablet Take 1 Tab by mouth every eight (8) hours for 5 days.  HYDROcodone-acetaminophen (NORCO) 5-325 mg per tablet Take 1-2 tablets PO every 4-6 hours as needed for pain control.   If over the counter ibuprofen or acetaminophen was suggested, then only take the vicodin for pain not well controlled with the over the counter medication.  penicillin v potassium (VEETID) 500 mg tablet Take 1 Tab by mouth four (4) times daily for 10 days.  clindamycin (CLEOCIN) 150 mg capsule Take 2 Caps by mouth four (4) times daily for 10 days.  magic mouthwash (ALINA) susp Take 5 mL by mouth every four (4) hours as needed.  albuterol (PROVENTIL HFA, VENTOLIN HFA) 90 mcg/actuation inhaler Take 2 Puffs by inhalation every six (6) hours as needed.  fluticasone (FLOVENT HFA) 44 mcg/actuation inhaler Take 1 Puff by inhalation two (2) times a day. Disposition:  home    DISCHARGE NOTE:   8:43 AM    Pt has been reexamined. Patient has no new complaints, changes, or physical findings. Care plan outlined and precautions discussed. Results of exam were reviewed with the patient. All medications were reviewed with the patient; will d/c home with see below. All of pt's questions and concerns were addressed. Patient was instructed and agrees to follow up with dental, as well as to return to the ED upon further deterioration. Patient is ready to go home. Follow-up Information     Follow up With Specialties Details Why [address] [place] [address]  Schedule an appointment as soon as possible for a visit in 2 days  Kari Gee [address] [facility] EMERGENCY DEPT Emergency Medicine  If symptoms worsen return [facility] [address] [phone]          Current Discharge Medication List      START taking these medications    Details   ibuprofen (MOTRIN) 800 mg tablet Take 1 Tab by mouth every eight (8) hours for 5 days. Qty: 15 Tab, Refills: 0      HYDROcodone-acetaminophen (NORCO) 5-325 mg per tablet Take 1-2 tablets PO every 4-6 hours as needed for pain control.   If over the counter ibuprofen or acetaminophen was suggested, then only take the vicodin for pain not well controlled with the over the counter medication. Qty: 6 Tab, Refills: 0    Associated Diagnoses: Dental abscess      penicillin v potassium (VEETID) 500 mg tablet Take 1 Tab by mouth four (4) times daily for 10 days. Qty: 40 Tab, Refills: 0      clindamycin (CLEOCIN) 150 mg capsule Take 2 Caps by mouth four (4) times daily for 10 days. Qty: 80 Cap, Refills: 0               Diagnosis     Clinical Impression:   1.  Dental abscess

## 2018-12-08 NOTE — DISCHARGE INSTRUCTIONS
Abscessed Tooth: Care Instructions  Your Care Instructions    An abscessed tooth is a tooth that has a pocket of pus in the tissues around it. Pus forms when the body tries to fight an infection caused by bacteria. If the pus cannot drain, it forms an abscess. An abscessed tooth can cause red, swollen gums and throbbing pain, especially when you chew. You may have a bad taste in your mouth and a fever, and your jaw may swell. Damage to the tooth, untreated tooth decay, or gum disease can cause an abscessed tooth. An abscessed tooth needs to be treated by a dental professional right away. If it is not treated, the infection could spread to other parts of your body. Your dentist will give you antibiotics to stop the infection. He or she may make a hole in the tooth or cut open (britton) the abscess inside your mouth so that the infection can drain, which should relieve your pain. You may need to have a root canal treatment, which tries to save your tooth by taking out the infected pulp and replacing it with a healing medicine and/or a filling. If these treatments do not work, your tooth may have to be removed. Follow-up care is a key part of your treatment and safety. Be sure to make and go to all appointments, and call your doctor if you are having problems. It's also a good idea to know your test results and keep a list of the medicines you take. How can you care for yourself at home? · Reduce pain and swelling in your face and jaw by putting ice or a cold pack on the outside of your cheek for 10 to 20 minutes at a time. Put a thin cloth between the ice and your skin. · Take pain medicines exactly as directed. ? If the doctor gave you a prescription medicine for pain, take it as prescribed. ? If you are not taking a prescription pain medicine, ask your doctor if you can take an over-the-counter medicine. · Take your antibiotics as directed. Do not stop taking them just because you feel better.  You need to take the full course of antibiotics. To prevent tooth abscess  · Brush and floss every day, and have regular dental checkups. · Eat a healthy diet, and avoid sugary foods and drinks. · Do not smoke, use e-cigarettes with nicotine, or use spit tobacco. Tobacco and nicotine slow your ability to heal. Tobacco also increases your risk for gum disease and cancer of the mouth and throat. If you need help quitting, talk to your doctor about stop-smoking programs and medicines. These can increase your chances of quitting for good. When should you call for help? Call 911 anytime you think you may need emergency care. For example, call if:    · You have trouble breathing.    Call your doctor now or seek immediate medical care if:    · You have new or worse symptoms of infection, such as:  ? Increased pain, swelling, warmth, or redness. ? Red streaks leading from the area. ? Pus draining from the area. ? A fever.    Watch closely for changes in your health, and be sure to contact your doctor if:    · You do not get better as expected. Where can you learn more? Go to http://ilsa-bolivar.info/. Enter Z635 in the search box to learn more about \"Abscessed Tooth: Care Instructions. \"  Current as of: March 28, 2018  Content Version: 11.8  © 2424-1976 [employer]. Care instructions adapted under license by [employer] (which disclaims liability or warranty for this information). If you have questions about a medical condition or this instruction, always ask your healthcare professional. [place] [address] any warranty or liability for your use of this information.

## 2019-01-20 ENCOUNTER — HOSPITAL ENCOUNTER (EMERGENCY)
Age: 54
Discharge: HOME | End: 2019-01-20
Attending: EMERGENCY MEDICINE
Payer: MEDICAID

## 2019-01-20 VITALS
BODY MASS INDEX: 22.15 KG/M2 | HEART RATE: 97 BPM | RESPIRATION RATE: 18 BRPM | WEIGHT: 125 LBS | TEMPERATURE: 98.5 F | OXYGEN SATURATION: 100 % | SYSTOLIC BLOOD PRESSURE: 138 MMHG | HEIGHT: 63 IN | DIASTOLIC BLOOD PRESSURE: 94 MMHG

## 2019-01-20 DIAGNOSIS — K52.9 GASTROENTERITIS, ACUTE: Primary | ICD-10-CM

## 2019-01-20 PROCEDURE — 74011250637 HC RX REV CODE- 250/637: Performed by: EMERGENCY MEDICINE

## 2019-01-20 PROCEDURE — 99283 EMERGENCY DEPT VISIT LOW MDM: CPT

## 2019-01-20 RX ORDER — ONDANSETRON 8 MG/1
8 TABLET, ORALLY DISINTEGRATING ORAL
Qty: 10 TAB | Refills: 0 | Status: SHIPPED | OUTPATIENT
Start: 2019-01-20 | End: 2021-03-10

## 2019-01-20 RX ORDER — ONDANSETRON 8 MG/1
8 TABLET, ORALLY DISINTEGRATING ORAL
Status: COMPLETED | OUTPATIENT
Start: 2019-01-20 | End: 2019-01-20

## 2019-01-20 RX ADMIN — ONDANSETRON 8 MG: 8 TABLET, ORALLY DISINTEGRATING ORAL at 15:20

## 2019-01-20 NOTE — ED TRIAGE NOTES
Productive cough with generalized fatigue/weakness, dizziness, upper abdominal pain with diarrhea since Thursday.

## 2019-01-20 NOTE — ED PROVIDER NOTES
EMERGENCY DEPARTMENT HISTORY AND PHYSICAL EXAM    3:10 PM      Date: 1/20/2019  Patient Name: Katja Davey    History of Presenting Illness     Chief Complaint   Patient presents with    Abdominal Pain    Cough    Fatigue    Diarrhea         History Provided By: Patient    Chief Complaint: Abdominal Pain; Nausea; Vomiting; Diarrhea  Duration:  Days  Timing:  Constant  Location: Upper abdomen   Quality: N/A  Severity: Moderate  Modifying Factors: None  Associated Symptoms: denies any other associated signs or symptoms      Additional History (Context): Katja Davey is a 46 y.o. female presenting to the ED c/o constant moderate upper abdominal pain, nausea, vomiting, and diarrhea for the past 5 days. Reports diarrhea improved after taking Imodium. Reports no other modifying factors for her symptoms. Denies any other symptoms or complaints. PCP: Allyssa Richardson NP    Current Outpatient Medications   Medication Sig Dispense Refill    ondansetron (ZOFRAN ODT) 8 mg disintegrating tablet Take 1 Tab by mouth every eight (8) hours as needed for Nausea. 10 Tab 0    albuterol (PROVENTIL HFA, VENTOLIN HFA) 90 mcg/actuation inhaler Take 2 Puffs by inhalation every six (6) hours as needed.  fluticasone (FLOVENT HFA) 44 mcg/actuation inhaler Take 1 Puff by inhalation two (2) times a day.            Past History     Past Medical History:  Past Medical History:   Diagnosis Date    Appetite loss 2013    Arthritis     in back    Asthma 2006    Back pain 8/31/2012    Chest pain     Degenerative disc disease, lumbar     DUB (dysfunctional uterine bleeding)     Foot pain, left     GERD (gastroesophageal reflux disease)     Headache(784.0)     migraine variant    Irregular heartbeat     Kidney calculus     Kidney stone     Menorrhagia     Pelvic pain in female     Postlaminectomy syndrome     S/P ankle ligament repair, left 2/7/2014    Stroke ([facility] [address] 1988, 1996, 1998    x3 - no residual    Tobacco abuse  Trichomonas     UTI (lower urinary tract infection)     Wears glasses        Past Surgical History:  Past Surgical History:   Procedure Laterality Date    HX LUMBAR FUSION  2012    L4-L5    HX ORTHOPAEDIC Left 2014    ligament reconstruction    HX TONSILLECTOMY      HX TUBAL LIGATION  1996    x2       Family History:  Family History   Problem Relation Age of Onset    Heart Disease Mother     Hypertension Mother    Fernandez Stroke Mother     Hypertension Father     Hypertension Sister     Diabetes Sister        Social History:  Social History     Tobacco Use    Smoking status: Current Every Day Smoker     Packs/day: 0.00     Years: 33.00     Pack years: 0.00     Last attempt to quit: 2013     Years since quittin.1    Smokeless tobacco: Never Used   Substance Use Topics    Alcohol use: No     Alcohol/week: 0.0 oz    Drug use: No       Allergies: Allergies   Allergen Reactions    Penicillins Rash         Review of Systems       Review of Systems   Constitutional: Negative for activity change, fatigue and fever. HENT: Negative for congestion and rhinorrhea. Eyes: Negative for visual disturbance. Respiratory: Negative for shortness of breath. Cardiovascular: Negative for chest pain and palpitations. Gastrointestinal: Positive for abdominal pain, diarrhea, nausea and vomiting. Genitourinary: Negative for dysuria and hematuria. Musculoskeletal: Negative for back pain. Skin: Negative for rash. Neurological: Negative for dizziness, weakness and light-headedness. All other systems reviewed and are negative. Physical Exam     Visit Vitals  BP (!) 138/94 (BP 1 Location: Left arm, BP Patient Position: At rest)   Pulse 97   Temp 98.5 °F (36.9 °C)   Resp 18   Ht 5' 3\" (1.6 m)   Wt 56.7 kg (125 lb)   LMP 2015   SpO2 100%   BMI 22.14 kg/m²         Physical Exam   Constitutional: She is oriented to person, place, and time.  She appears well-developed and well-nourished. No distress. HENT:   Head: Normocephalic and atraumatic. Right Ear: External ear normal.   Left Ear: External ear normal.   Nose: Nose normal.   Mouth/Throat: Oropharynx is clear and moist.   Eyes: Conjunctivae and EOM are normal. Pupils are equal, round, and reactive to light. No scleral icterus. Neck: Normal range of motion. Neck supple. No JVD present. No tracheal deviation present. No thyromegaly present. Cardiovascular: Normal rate, regular rhythm, normal heart sounds and intact distal pulses. Exam reveals no gallop and no friction rub. No murmur heard. Pulmonary/Chest: Effort normal and breath sounds normal. She exhibits no tenderness. Abdominal: Soft. Bowel sounds are normal. She exhibits no distension. There is tenderness (mild) in the epigastric area. There is no rebound and no guarding. Musculoskeletal: Normal range of motion. She exhibits no edema or tenderness. Lymphadenopathy:     She has no cervical adenopathy. Neurological: She is alert and oriented to person, place, and time. No cranial nerve deficit. Coordination normal.   No sensory loss, Gait normal, Motor 5/5   Skin: Skin is warm and dry. Psychiatric: She has a normal mood and affect. Her behavior is normal. Judgment and thought content normal.   Nursing note and vitals reviewed. Diagnostic Study Results     Labs -  No results found for this or any previous visit (from the past 12 hour(s)). Radiologic Studies -   No orders to display         Medical Decision Making     It should be noted that Megan GIBSON MD will be the provider of record for this patient. I reviewed the vital signs, available nursing notes, past medical history, past surgical history, family history and social history. Vital Signs-Reviewed the patient's vital signs.     Pulse Oximetry Analysis -  100% on room air, normal    Records Reviewed: Nursing Notes and Old Medical Records (Time of Review: 3:10 PM)    ED Course: Progress Notes, Reevaluation, and Consults:      Provider Notes (Medical Decision Making):     Diagnosis     Clinical Impression:   1. Gastroenteritis, acute        Disposition: Discharged    Follow-up Information     Follow up With Specialties Details Why Contact Jacqueline Georges NP  Schedule an appointment as soon as possible for a visit in 1 week  [phone] [id number]  [phone] [address] [facility] EMERGENCY DEPT Emergency Medicine  As needed, If symptoms worsen [address] [facility]              Medication List      START taking these medications    ondansetron 8 mg disintegrating tablet  Commonly known as:  ZOFRAN ODT  Take 1 Tab by mouth every eight (8) hours as needed for Nausea. ASK your doctor about these medications    albuterol 90 mcg/actuation inhaler  Commonly known as:  PROVENTIL HFA, VENTOLIN HFA, PROAIR HFA     FLOVENT HFA 44 mcg/actuation inhaler  Generic drug:  fluticasone           Where to Get Your Medications      Information about where to get these medications is not yet available    Ask your nurse or doctor about these medications  · ondansetron 8 mg disintegrating tablet       _______________________________       Scribe Kat Baca acting as a scribe for and in the presence of Ankur Patterson MD      January 20, 2019 at 3:10 PM       Provider Attestation:      I personally performed the services described in the documentation, reviewed the documentation, as recorded by the scribe in my presence, and it accurately and completely records my words and actions.  January 20, 2019 at 3:10 PM - Ankur Patterson MD      _______________________________

## 2019-01-20 NOTE — DISCHARGE INSTRUCTIONS
Gastroenteritis: Care Instructions  Your Care Instructions    Gastroenteritis is an illness that may cause nausea, vomiting, and diarrhea. It is sometimes called \"stomach flu. \" It can be caused by bacteria or a virus. You will probably begin to feel better in 1 to 2 days. In the meantime, get plenty of rest and make sure you do not become dehydrated. Dehydration occurs when your body loses too much fluid. Follow-up care is a key part of your treatment and safety. Be sure to make and go to all appointments, and call your doctor if you are having problems. It's also a good idea to know your test results and keep a list of the medicines you take. How can you care for yourself at home? · If your doctor prescribed antibiotics, take them as directed. Do not stop taking them just because you feel better. You need to take the full course of antibiotics. · Drink plenty of fluids to prevent dehydration, enough so that your urine is light yellow or clear like water. Choose water and other caffeine-free clear liquids until you feel better. If you have kidney, heart, or liver disease and have to limit fluids, talk with your doctor before you increase your fluid intake. · Drink fluids slowly, in frequent, small amounts, because drinking too much too fast can cause vomiting. · Begin eating mild foods, such as dry toast, yogurt, applesauce, bananas, and rice. Avoid spicy, hot, or high-fat foods, and do not drink alcohol or caffeine for a day or two. Do not drink milk or eat ice cream until you are feeling better. How to prevent gastroenteritis  · Keep hot foods hot and cold foods cold. · Do not eat meats, dressings, salads, or other foods that have been kept at room temperature for more than 2 hours. · Use a thermometer to check your refrigerator. It should be between 34°F and 40°F.  · Defrost meats in the refrigerator or microwave, not on the kitchen counter. · Keep your hands and your kitchen clean.  Wash your hands, cutting boards, and countertops with hot soapy water frequently. · Cook meat until it is well done. · Do not eat raw eggs or uncooked sauces made with raw eggs. · Do not take chances. If food looks or tastes spoiled, throw it out. When should you call for help? Call 911 anytime you think you may need emergency care. For example, call if:    · You vomit blood or what looks like coffee grounds.     · You passed out (lost consciousness).     · You pass maroon or very bloody stools.    Call your doctor now or seek immediate medical care if:    · You have severe belly pain.     · You have signs of needing more fluids. You have sunken eyes, a dry mouth, and pass only a little dark urine.     · You feel like you are going to faint.     · You have increased belly pain that does not go away in 1 to 2 days.     · You have new or increased nausea, or you are vomiting.     · You have a new or higher fever.     · Your stools are black and tarlike or have streaks of blood.    Watch closely for changes in your health, and be sure to contact your doctor if:    · You are dizzy or lightheaded.     · You urinate less than usual, or your urine is dark yellow or brown.     · You do not feel better with each day that goes by. Where can you learn more? Go to http://ilsa-bolivar.info/. Enter N142 in the search box to learn more about \"Gastroenteritis: Care Instructions. \"  Current as of: July 30, 2018  Content Version: 11.9  © 3505-3357 [employer]. Care instructions adapted under license by [employer] (which disclaims liability or warranty for this information). If you have questions about a medical condition or this instruction, always ask your healthcare professional. [address] any warranty or liability for your use of this information.

## 2019-01-20 NOTE — LETTER
NOTIFICATION RETURN TO WORK / SCHOOL 
 
1/20/2019 3:16 PM 
 
Ms. Brittni Graham [facility] 
[address] [facility] To Whom It May Concern: 
 
Brittni Graham is currently under the care of [id number] [facility] EMERGENCY DEPT. She will return to work/school on: Tuesday, January 22, 2019 If there are questions or concerns please have the patient contact our office.  
 
 
 
Sincerely, 
 
 
Rachell Diaz MD

## 2019-04-25 ENCOUNTER — HOSPITAL ENCOUNTER (EMERGENCY)
Age: 54
Discharge: HOME | End: 2019-04-25
Attending: EMERGENCY MEDICINE
Payer: MEDICAID

## 2019-04-25 VITALS
HEART RATE: 88 BPM | HEIGHT: 64 IN | WEIGHT: 125 LBS | SYSTOLIC BLOOD PRESSURE: 142 MMHG | OXYGEN SATURATION: 100 % | RESPIRATION RATE: 18 BRPM | TEMPERATURE: 97.7 F | BODY MASS INDEX: 21.34 KG/M2 | DIASTOLIC BLOOD PRESSURE: 83 MMHG

## 2019-04-25 DIAGNOSIS — R42 DIZZINESS: Primary | ICD-10-CM

## 2019-04-25 DIAGNOSIS — E86.0 DEHYDRATION: ICD-10-CM

## 2019-04-25 LAB
ALBUMIN SERPL-MCNC: 3.8 G/DL (ref 3.4–5)
ALBUMIN/GLOB SERPL: 1.2 {RATIO} (ref 0.8–1.7)
ALP SERPL-CCNC: 119 U/L (ref 45–117)
ALT SERPL-CCNC: 27 U/L (ref 13–56)
ANION GAP SERPL CALC-SCNC: 4 MMOL/L (ref 3–18)
AST SERPL-CCNC: 18 U/L (ref 15–37)
ATRIAL RATE: 94 BPM
BASOPHILS # BLD: 0 K/UL (ref 0–0.1)
BASOPHILS NFR BLD: 0 % (ref 0–2)
BILIRUB SERPL-MCNC: 0.5 MG/DL (ref 0.2–1)
BUN SERPL-MCNC: 15 MG/DL (ref 7–18)
BUN/CREAT SERPL: 20 (ref 12–20)
CALCIUM SERPL-MCNC: 8.9 MG/DL (ref 8.5–10.1)
CALCULATED P AXIS, ECG09: 73 DEGREES
CALCULATED R AXIS, ECG10: -33 DEGREES
CALCULATED T AXIS, ECG11: 31 DEGREES
CHLORIDE SERPL-SCNC: 113 MMOL/L (ref 100–108)
CK MB CFR SERPL CALC: 1.1 % (ref 0–4)
CK MB SERPL-MCNC: 2.2 NG/ML (ref 5–25)
CK SERPL-CCNC: 201 U/L (ref 26–192)
CO2 SERPL-SCNC: 27 MMOL/L (ref 21–32)
CREAT SERPL-MCNC: 0.75 MG/DL (ref 0.6–1.3)
DIAGNOSIS, 93000: NORMAL
DIFFERENTIAL METHOD BLD: ABNORMAL
EOSINOPHIL # BLD: 0.1 K/UL (ref 0–0.4)
EOSINOPHIL NFR BLD: 1 % (ref 0–5)
ERYTHROCYTE [DISTWIDTH] IN BLOOD BY AUTOMATED COUNT: 14.7 % (ref 11.6–14.5)
GLOBULIN SER CALC-MCNC: 3.2 G/DL (ref 2–4)
GLUCOSE SERPL-MCNC: 83 MG/DL (ref 74–99)
HCT VFR BLD AUTO: 40.4 % (ref 35–45)
HGB BLD-MCNC: 14 G/DL (ref 12–16)
LYMPHOCYTES # BLD: 4 K/UL (ref 0.9–3.6)
LYMPHOCYTES NFR BLD: 38 % (ref 21–52)
MCH RBC QN AUTO: 31.5 PG (ref 24–34)
MCHC RBC AUTO-ENTMCNC: 34.7 G/DL (ref 31–37)
MCV RBC AUTO: 90.8 FL (ref 74–97)
MONOCYTES # BLD: 0.4 K/UL (ref 0.05–1.2)
MONOCYTES NFR BLD: 4 % (ref 3–10)
NEUTS SEG # BLD: 5.9 K/UL (ref 1.8–8)
NEUTS SEG NFR BLD: 57 % (ref 40–73)
P-R INTERVAL, ECG05: 136 MS
PLATELET # BLD AUTO: 185 K/UL (ref 135–420)
PMV BLD AUTO: 10 FL (ref 9.2–11.8)
POTASSIUM SERPL-SCNC: 3.6 MMOL/L (ref 3.5–5.5)
PROT SERPL-MCNC: 7 G/DL (ref 6.4–8.2)
Q-T INTERVAL, ECG07: 352 MS
QRS DURATION, ECG06: 82 MS
QTC CALCULATION (BEZET), ECG08: 440 MS
RBC # BLD AUTO: 4.45 M/UL (ref 4.2–5.3)
SODIUM SERPL-SCNC: 144 MMOL/L (ref 136–145)
TROPONIN I SERPL-MCNC: <0.02 NG/ML (ref 0–0.04)
VENTRICULAR RATE, ECG03: 94 BPM
WBC # BLD AUTO: 10.4 K/UL (ref 4.6–13.2)

## 2019-04-25 PROCEDURE — 80053 COMPREHEN METABOLIC PANEL: CPT

## 2019-04-25 PROCEDURE — 93005 ELECTROCARDIOGRAM TRACING: CPT

## 2019-04-25 PROCEDURE — 96360 HYDRATION IV INFUSION INIT: CPT

## 2019-04-25 PROCEDURE — 99283 EMERGENCY DEPT VISIT LOW MDM: CPT

## 2019-04-25 PROCEDURE — 82550 ASSAY OF CK (CPK): CPT

## 2019-04-25 PROCEDURE — 85025 COMPLETE CBC W/AUTO DIFF WBC: CPT

## 2019-04-25 PROCEDURE — 74011250636 HC RX REV CODE- 250/636: Performed by: EMERGENCY MEDICINE

## 2019-04-25 RX ADMIN — SODIUM CHLORIDE 1000 ML: 900 INJECTION, SOLUTION INTRAVENOUS at 14:51

## 2019-04-25 NOTE — ED NOTES
I performed a brief evaluation, including history and physical, of the patient here in triage and I have determined that pt will need further treatment and evaluation from the main side ER physician. I have placed initial orders to help in expediting patients care. April 25, 2019 at 1:16 PM - Noble Thomas PA-C Visit Vitals /88 Pulse 93 Temp 97.7 °F (36.5 °C) Resp 18 SpO2 100%

## 2019-04-25 NOTE — ED TRIAGE NOTES
Received pt in FT1. Pt c/o dizziness x2 days. States \"It could be because I don't drink enough water. That's all it could be. \" States vision gets blurry but comes and goes. Denies blurry vision right now. Denies nausea. States she feels \"disoriented\" like she is forgetting things. She is A&Ox4. Also states EKG was completed in triage.

## 2019-04-25 NOTE — ED TRIAGE NOTES
Pt c/o dizziness for the past 2 days. Denies headache, nausea or vomiting. HX: stroke, no deficits. Pt ambulatory to triage, no distress noted.

## 2019-04-25 NOTE — ED NOTES
Pt discharged to home, discharge paperwork and follow up instructions given to pt and all questions answered.

## 2019-04-25 NOTE — DISCHARGE INSTRUCTIONS
Patient Education        Dehydration: Care Instructions  Your Care Instructions  Dehydration happens when your body loses too much fluid. This might happen when you do not drink enough water or you lose large amounts of fluids from your body because of diarrhea, vomiting, or sweating. Severe dehydration can be life-threatening. Water and minerals called electrolytes help put your body fluids back in balance. Learn the early signs of fluid loss, and drink more fluids to prevent dehydration. Follow-up care is a key part of your treatment and safety. Be sure to make and go to all appointments, and call your doctor if you are having problems. It's also a good idea to know your test results and keep a list of the medicines you take. How can you care for yourself at home? · To prevent dehydration, drink plenty of fluids, enough so that your urine is light yellow or clear like water. Choose water and other caffeine-free clear liquids until you feel better. If you have kidney, heart, or liver disease and have to limit fluids, talk with your doctor before you increase the amount of fluids you drink. · If you do not feel like eating or drinking, try taking small sips of water, sports drinks, or other rehydration drinks. · Get plenty of rest.  To prevent dehydration  · Add more fluids to your diet and daily routine, unless your doctor has told you not to. · During hot weather, drink more fluids. Drink even more fluids if you exercise a lot. Stay away from drinks with alcohol or caffeine. · Watch for the symptoms of dehydration. These include:  ? A dry, sticky mouth. ? Dark yellow urine, and not much of it. ? Dry and sunken eyes. ? Feeling very tired. · Learn what problems can lead to dehydration. These include:  ? Diarrhea, fever, and vomiting. ? Any illness with a fever, such as pneumonia or the flu. ?  Activities that cause heavy sweating, such as endurance races and heavy outdoor work in hot or humid weather. ? Alcohol or drug abuse or withdrawal.  ? Certain medicines, such as cold and allergy pills (antihistamines), diet pills (diuretics), and laxatives. ? Certain diseases, such as diabetes, cancer, and heart or kidney disease. When should you call for help? Call 911 anytime you think you may need emergency care. For example, call if:    · You passed out (lost consciousness).    Call your doctor now or seek immediate medical care if:    · You are confused and cannot think clearly.     · You are dizzy or lightheaded, or you feel like you may faint.     · You have signs of needing more fluids. You have sunken eyes and a dry mouth, and you pass only a little dark urine.     · You cannot keep fluids down.    Watch closely for changes in your health, and be sure to contact your doctor if:    · You are not making tears.     · Your skin is very dry and sags slowly back into place after you pinch it.     · Your mouth and eyes are very dry. Where can you learn more? Go to http://ilsa-bolivar.info/. Enter J243 in the search box to learn more about \"Dehydration: Care Instructions. \"  Current as of: September 23, 2018  Content Version: 11.9  © 7617-5737 [employer]. Care instructions adapted under license by [employer] (which disclaims liability or warranty for this information). If you have questions about a medical condition or this instruction, always ask your healthcare professional. [place] [address] any warranty or liability for your use of this information. Patient Education        Dizziness: Care Instructions  Your Care Instructions  Dizziness is the feeling of unsteadiness or fuzziness in your head. It is different than having vertigo, which is a feeling that the room is spinning or that you are moving or falling. It is also different from lightheadedness, which is the feeling that you are about to faint.   It can be hard to know what causes dizziness. Some people feel dizzy when they have migraine headaches. Sometimes bouts of flu can make you feel dizzy. Some medical conditions, such as heart problems or high blood pressure, can make you feel dizzy. Many medicines can cause dizziness, including medicines for high blood pressure, pain, or anxiety. If a medicine causes your symptoms, your doctor may recommend that you stop or change the medicine. If it is a problem with your heart, you may need medicine to help your heart work better. If there is no clear reason for your symptoms, your doctor may suggest watching and waiting for a while to see if the dizziness goes away on its own. Follow-up care is a key part of your treatment and safety. Be sure to make and go to all appointments, and call your doctor if you are having problems. It's also a good idea to know your test results and keep a list of the medicines you take. How can you care for yourself at home? · If your doctor recommends or prescribes medicine, take it exactly as directed. Call your doctor if you think you are having a problem with your medicine. · Do not drive while you feel dizzy. · Try to prevent falls. Steps you can take include:  ? Using nonskid mats, adding grab bars near the tub, and using night-lights. ? Clearing your home so that walkways are free of anything you might trip on.  ? Letting family and friends know that you have been feeling dizzy. This will help them know how to help you. When should you call for help? Call 911 anytime you think you may need emergency care. For example, call if:    · You passed out (lost consciousness).     · You have dizziness along with symptoms of a heart attack. These may include:  ? Chest pain or pressure, or a strange feeling in the chest.  ? Sweating. ? Shortness of breath. ? Nausea or vomiting.   ? Pain, pressure, or a strange feeling in the back, neck, jaw, or upper belly or in one or both shoulders or arms.  ? Lightheadedness or sudden weakness. ? A fast or irregular heartbeat.     · You have symptoms of a stroke. These may include:  ? Sudden numbness, tingling, weakness, or loss of movement in your face, arm, or leg, especially on only one side of your body. ? Sudden vision changes. ? Sudden trouble speaking. ? Sudden confusion or trouble understanding simple statements. ? Sudden problems with walking or balance. ? A sudden, severe headache that is different from past headaches.    Call your doctor now or seek immediate medical care if:    · You feel dizzy and have a fever, headache, or ringing in your ears.     · You have new or increased nausea and vomiting.     · Your dizziness does not go away or comes back.    Watch closely for changes in your health, and be sure to contact your doctor if:    · You do not get better as expected. Where can you learn more? Go to http://ilsa-bolivar.info/. Enter F130 in the search box to learn more about \"Dizziness: Care Instructions. \"  Current as of: September 23, 2018  Content Version: 11.9  © 0979-4978 [employer]. Care instructions adapted under license by [employer] (which disclaims liability or warranty for this information). If you have questions about a medical condition or this instruction, always ask your healthcare professional. [place] [address] any warranty or liability for your use of this information.

## 2019-04-25 NOTE — LETTER
[address] [place] [address] [facility] EMERGENCY DEPT 
[address] [facility] [address] 
[phone] Work/School Note Date: 4/25/2019 To Whom It May concern: 
 
Errol Simon was seen and treated today in the emergency room by the following provider(s): 
Attending Provider: Iraida Ocampo MD 
Physician Assistant: FLORA Emerson. Errol Simon may return to work on 4/27/19. Sincerely, Deuce Ontiveros

## 2019-04-25 NOTE — ED PROVIDER NOTES
EMERGENCY DEPARTMENT HISTORY AND PHYSICAL EXAM    Date: 4/25/2019  Patient Name: Alexey Briceno    History of Presenting Illness     Chief Complaint   Patient presents with    Dizziness         History Provided By: Patient     Additional History (Context): Alexey Briceno is a 53 y.o. female with stroke who presents with dizziness since yesterday. Patient denies vertiginous room spinning, tinnitus, unilateral weakness or numbness, denies any neck pain or stiffness, denies any chest pain shortness of breath or palpitations or congestion. Patient says that she feels like she gets like her feeling that she has had since yesterday when the weather gets warm initially and she gets a little overheated. Also feels dehydrated. Denies nausea vomiting or recent head trauma. Denies gait instability. PCP: Siomara Mejia NP    Current Facility-Administered Medications   Medication Dose Route Frequency Provider Last Rate Last Dose    sodium chloride 0.9 % bolus infusion 1,000 mL  1,000 mL IntraVENous ONCE Mee Parson PA         Current Outpatient Medications   Medication Sig Dispense Refill    ondansetron (ZOFRAN ODT) 8 mg disintegrating tablet Take 1 Tab by mouth every eight (8) hours as needed for Nausea. 10 Tab 0    albuterol (PROVENTIL HFA, VENTOLIN HFA) 90 mcg/actuation inhaler Take 2 Puffs by inhalation every six (6) hours as needed.  fluticasone (FLOVENT HFA) 44 mcg/actuation inhaler Take 1 Puff by inhalation two (2) times a day.            Past History     Past Medical History:  Past Medical History:   Diagnosis Date    Appetite loss 2013    Arthritis     in back    Asthma 2006    Back pain 8/31/2012    Chest pain     Degenerative disc disease, lumbar     DUB (dysfunctional uterine bleeding)     Foot pain, left     GERD (gastroesophageal reflux disease)     Headache(784.0)     migraine variant    Irregular heartbeat     Kidney calculus     Kidney stone     Menorrhagia     Pelvic pain in female     Postlaminectomy syndrome     S/P ankle ligament repair, left 2014    Stroke ([address] , , 1998    x3 - no residual    Tobacco abuse     Trichomonas     UTI (lower urinary tract infection)     Wears glasses        Past Surgical History:  Past Surgical History:   Procedure Laterality Date    HX LUMBAR FUSION  2012    L4-L5    HX ORTHOPAEDIC Left 2014    ligament reconstruction    HX TONSILLECTOMY      HX TUBAL LIGATION  1996    x2       Family History:  Family History   Problem Relation Age of Onset    Heart Disease Mother     Hypertension Mother    Carel.Monas Stroke Mother     Hypertension Father     Hypertension Sister     Diabetes Sister        Social History:  Social History     Tobacco Use    Smoking status: Current Every Day Smoker     Packs/day: 0.00     Years: 33.00     Pack years: 0.00     Last attempt to quit: 2013     Years since quittin.4    Smokeless tobacco: Never Used   Substance Use Topics    Alcohol use: No     Alcohol/week: 0.0 oz    Drug use: No       Allergies: Allergies   Allergen Reactions    Penicillins Rash         Review of Systems   Review of Systems   Constitutional: Negative for fever. HENT: Negative for congestion. Eyes: Negative for visual disturbance. Respiratory: Negative for shortness of breath. Gastrointestinal: Negative for abdominal pain, nausea and vomiting. Musculoskeletal: Negative for neck pain and neck stiffness. Neurological: Positive for dizziness. Negative for tremors, seizures, syncope, facial asymmetry, speech difficulty, weakness, light-headedness, numbness and headaches. All Other Systems Negative  Physical Exam     Vitals:    19 1312 19 1315 19 1338   BP: 137/88     Pulse: 93     Resp: 18     Temp:  97.7 °F (36.5 °C)    SpO2: 100%     Weight:   56.7 kg (125 lb)   Height:   5' 3.5\" (1.613 m)     Physical Exam   Constitutional: She is oriented to person, place, and time.  She appears well-developed. HENT:   Head: Normocephalic and atraumatic. Oral mucosa dry. Eyes: Pupils are equal, round, and reactive to light. EOM are normal.   No nystagmus. Neck: No JVD present. No tracheal deviation present. No thyromegaly present. Cardiovascular: Normal rate, regular rhythm and normal heart sounds. Exam reveals no gallop and no friction rub. No murmur heard. Pulmonary/Chest: Effort normal and breath sounds normal. No stridor. No respiratory distress. She has no wheezes. She has no rales. She exhibits no tenderness. Abdominal: Soft. She exhibits no distension and no mass. There is no tenderness. There is no rebound and no guarding. Musculoskeletal: She exhibits no edema or tenderness. Lymphadenopathy:     She has no cervical adenopathy. Neurological: She is alert and oriented to person, place, and time. No cranial nerve deficit. She exhibits normal muscle tone. Coordination normal.   Negative Romberg. No dysdiadochokinesis, past-pointing, or tremor. Normal heel shin. Skin: Skin is warm and dry. No rash noted. No erythema. No pallor. Psychiatric: She has a normal mood and affect. Her behavior is normal. Thought content normal.   Nursing note and vitals reviewed.          Diagnostic Study Results     Labs -     Recent Results (from the past 12 hour(s))   EKG, 12 LEAD, INITIAL    Collection Time: 04/25/19 12:54 PM   Result Value Ref Range    Ventricular Rate 94 BPM    Atrial Rate 94 BPM    P-R Interval 136 ms    QRS Duration 82 ms    Q-T Interval 352 ms    QTC Calculation (Bezet) 440 ms    Calculated P Axis 73 degrees    Calculated R Axis -33 degrees    Calculated T Axis 31 degrees    Diagnosis       Normal sinus rhythm  Possible Left atrial enlargement  Left axis deviation  Abnormal ECG  When compared with ECG of 26-DEC-2016 11:09,  No significant change was found     CBC WITH AUTOMATED DIFF    Collection Time: 04/25/19  1:00 PM   Result Value Ref Range    WBC 10.4 4.6 - 13.2 K/uL RBC 4.45 4.20 - 5.30 M/uL    HGB 14.0 12.0 - 16.0 g/dL    HCT 40.4 35.0 - 45.0 %    MCV 90.8 74.0 - 97.0 FL    MCH 31.5 24.0 - 34.0 PG    MCHC 34.7 31.0 - 37.0 g/dL    RDW 14.7 (H) 11.6 - 14.5 %    PLATELET 786 075 - 265 K/uL    MPV 10.0 9.2 - 11.8 FL    NEUTROPHILS 57 40 - 73 %    LYMPHOCYTES 38 21 - 52 %    MONOCYTES 4 3 - 10 %    EOSINOPHILS 1 0 - 5 %    BASOPHILS 0 0 - 2 %    ABS. NEUTROPHILS 5.9 1.8 - 8.0 K/UL    ABS. LYMPHOCYTES 4.0 (H) 0.9 - 3.6 K/UL    ABS. MONOCYTES 0.4 0.05 - 1.2 K/UL    ABS. EOSINOPHILS 0.1 0.0 - 0.4 K/UL    ABS. BASOPHILS 0.0 0.0 - 0.1 K/UL    DF AUTOMATED     CARDIAC PANEL,(CK, CKMB & TROPONIN)    Collection Time: 04/25/19  1:00 PM   Result Value Ref Range     (H) 26 - 192 U/L    CK - MB 2.2 <3.6 ng/ml    CK-MB Index 1.1 0.0 - 4.0 %    Troponin-I, QT <0.02 0.0 - 9.712 NG/ML   METABOLIC PANEL, COMPREHENSIVE    Collection Time: 04/25/19  1:00 PM   Result Value Ref Range    Sodium 144 136 - 145 mmol/L    Potassium 3.6 3.5 - 5.5 mmol/L    Chloride 113 (H) 100 - 108 mmol/L    CO2 27 21 - 32 mmol/L    Anion gap 4 3.0 - 18 mmol/L    Glucose 83 74 - 99 mg/dL    BUN 15 7.0 - 18 MG/DL    Creatinine 0.75 0.6 - 1.3 MG/DL    BUN/Creatinine ratio 20 12 - 20      GFR est AA >60 >60 ml/min/1.73m2    GFR est non-AA >60 >60 ml/min/1.73m2    Calcium 8.9 8.5 - 10.1 MG/DL    Bilirubin, total 0.5 0.2 - 1.0 MG/DL    ALT (SGPT) 27 13 - 56 U/L    AST (SGOT) 18 15 - 37 U/L    Alk. phosphatase 119 (H) 45 - 117 U/L    Protein, total 7.0 6.4 - 8.2 g/dL    Albumin 3.8 3.4 - 5.0 g/dL    Globulin 3.2 2.0 - 4.0 g/dL    A-G Ratio 1.2 0.8 - 1.7         Radiologic Studies -   No orders to display     CT Results  (Last 48 hours)    None        CXR Results  (Last 48 hours)    None            Medical Decision Making   I am the first provider for this patient. I reviewed the vital signs, available nursing notes, past medical history, past surgical history, family history and social history.     Vital Signs-Reviewed the patient's vital signs. Records Reviewed: Nursing Notes    Procedures:  EKG  Date/Time: 4/25/2019 12:56 PM  Performed by: FLORA Lopez  Authorized by: FLORA Lopez     Interpretation:     Interpretation: abnormal    Rate:     ECG rate:  94    ECG rate assessment: normal    Rhythm:     Rhythm: sinus rhythm    Ectopy:     Ectopy: none    QRS:     QRS axis:  Left    QRS intervals:  Normal  Conduction:     Conduction: normal    ST segments:     ST segments:  Normal  T waves:     T waves: normal          Provider Notes (Medical Decision Making): Labs EKG and physical exam 0.2 with mild dehydration. Oral mucosa dry. CK slightly elevated. Will give IV fluids. Not orthostatic. Neuro exam reassuring. Vital signs stable. DC home. MED RECONCILIATION:  Current Facility-Administered Medications   Medication Dose Route Frequency    sodium chloride 0.9 % bolus infusion 1,000 mL  1,000 mL IntraVENous ONCE     Current Outpatient Medications   Medication Sig    ondansetron (ZOFRAN ODT) 8 mg disintegrating tablet Take 1 Tab by mouth every eight (8) hours as needed for Nausea.  albuterol (PROVENTIL HFA, VENTOLIN HFA) 90 mcg/actuation inhaler Take 2 Puffs by inhalation every six (6) hours as needed.  fluticasone (FLOVENT HFA) 44 mcg/actuation inhaler Take 1 Puff by inhalation two (2) times a day. Disposition:  home    DISCHARGE NOTE:   2:42 PM    Pt has been reexamined. Patient has no new complaints, changes, or physical findings. Care plan outlined and precautions discussed. Results of labs were reviewed with the patient. All medications were reviewed with the patient. All of pt's questions and concerns were addressed. Patient was instructed and agrees to follow up with PCP, as well as to return to the ED upon further deterioration. Patient is ready to go home.     Follow-up Information     Follow up With Specialties Details Why Contact Info    Therese Del Cid NP  Schedule an appointment as soon as possible for a visit in 1 day  [address]      [facility] EMERGENCY DEPT Emergency Medicine  If symptoms worsen return immediately [address]  [phone]          Current Discharge Medication List            Diagnosis     Clinical Impression:   1. Dizziness    2.  Dehydration

## 2019-06-11 ENCOUNTER — HOSPITAL ENCOUNTER (EMERGENCY)
Age: 54
Discharge: HOME | End: 2019-06-11
Attending: EMERGENCY MEDICINE
Payer: MEDICAID

## 2019-06-11 VITALS
BODY MASS INDEX: 22.15 KG/M2 | RESPIRATION RATE: 15 BRPM | HEART RATE: 89 BPM | WEIGHT: 125 LBS | TEMPERATURE: 98.3 F | SYSTOLIC BLOOD PRESSURE: 126 MMHG | OXYGEN SATURATION: 100 % | HEIGHT: 63 IN | DIASTOLIC BLOOD PRESSURE: 71 MMHG

## 2019-06-11 DIAGNOSIS — S05.01XA RIGHT CORNEAL ABRASION, INITIAL ENCOUNTER: Primary | ICD-10-CM

## 2019-06-11 PROCEDURE — 74011000250 HC RX REV CODE- 250: Performed by: PHYSICIAN ASSISTANT

## 2019-06-11 PROCEDURE — 99283 EMERGENCY DEPT VISIT LOW MDM: CPT

## 2019-06-11 RX ORDER — PROPARACAINE HYDROCHLORIDE 5 MG/ML
1 SOLUTION/ DROPS OPHTHALMIC
Status: COMPLETED | OUTPATIENT
Start: 2019-06-11 | End: 2019-06-11

## 2019-06-11 RX ORDER — ERYTHROMYCIN 5 MG/G
OINTMENT OPHTHALMIC
Qty: 1 TUBE | Refills: 0 | Status: SHIPPED | OUTPATIENT
Start: 2019-06-11 | End: 2019-06-18

## 2019-06-11 RX ADMIN — FLUORESCEIN SODIUM 1 STRIP: 1 STRIP OPHTHALMIC at 16:34

## 2019-06-11 RX ADMIN — PROPARACAINE HYDROCHLORIDE 1 DROP: 5 SOLUTION/ DROPS OPHTHALMIC at 16:35

## 2019-06-11 NOTE — LETTER
[facility] [place] 
THE [facility] EMERGENCY DEPT 
[address] [address] 
[phone] Work/School Note Date: 6/11/2019 To Whom It May concern: 
 
Oswald Sanchez was seen and treated today in the emergency room by the following provider(s): 
Attending Provider: Aida Mireles DO Physician Assistant: Thao Lang PA-C. Oswald Sanchez may return to work on 06/13/2019. Sincerely, Eleanor Bolaños PA-C

## 2019-06-11 NOTE — ED PROVIDER NOTES
EMERGENCY DEPARTMENT HISTORY AND PHYSICAL EXAM    Date: 6/11/2019  Patient Name: Lester Zavala    History of Presenting Illness     Chief Complaint   Patient presents with    Eye Pain         History Provided By: Patient    Chief Complaint: eye pain    HPI(Context):   3:51 PM  Lester Zavala is a 53 y.o. female with PMHX of asthma, GERD, renal stone, who presents to the emergency department C/O right eye pain. Associated sxs include FB sensation. Pt reports she works at [employer] and feels she had something drop in her eye (possible pain chip). Pt was wearing safety glasses. Pt flushed eye with no relief. Pt wears corrective lenses. Pt denies contact lenses use, blurry/double vision, facial swelling, and any other sxs or complaints. PCP: Shruthi Chowdhury NP    Current Outpatient Medications   Medication Sig Dispense Refill    erythromycin (ILOTYCIN) ophthalmic ointment Apply thin line (1.25 cm) to left eye every 4 hours x 7 days 1 Tube 0    albuterol (PROVENTIL HFA, VENTOLIN HFA) 90 mcg/actuation inhaler Take 2 Puffs by inhalation every six (6) hours as needed.  fluticasone (FLOVENT HFA) 44 mcg/actuation inhaler Take 1 Puff by inhalation two (2) times a day.  ondansetron (ZOFRAN ODT) 8 mg disintegrating tablet Take 1 Tab by mouth every eight (8) hours as needed for Nausea.  10 Tab 0       Past History     Past Medical History:  Past Medical History:   Diagnosis Date    Appetite loss 2013    Arthritis     in back    Asthma 2006    Back pain 8/31/2012    Chest pain     Degenerative disc disease, lumbar     DUB (dysfunctional uterine bleeding)     Foot pain, left     GERD (gastroesophageal reflux disease)     Headache(784.0)     migraine variant    Irregular heartbeat     Kidney calculus     Kidney stone     Menorrhagia     Pelvic pain in female     Postlaminectomy syndrome     S/P ankle ligament repair, left 2/7/2014    Stroke ([facility] [address] 1988, 1996, 1998    x3 - no residual    Tobacco abuse     Trichomonas     UTI (lower urinary tract infection)     Wears glasses        Past Surgical History:  Past Surgical History:   Procedure Laterality Date    HX LUMBAR FUSION  2012    L4-L5    HX ORTHOPAEDIC Left 2014    ligament reconstruction    HX TONSILLECTOMY      HX TUBAL LIGATION  1996    x2       Family History:  Family History   Problem Relation Age of Onset    Heart Disease Mother     Hypertension Mother    Jv Chung Stroke Mother     Hypertension Father     Hypertension Sister     Diabetes Sister        Social History:  Social History     Tobacco Use    Smoking status: Current Every Day Smoker     Packs/day: 0.00     Years: 33.00     Pack years: 0.00     Last attempt to quit: 2013     Years since quittin.5    Smokeless tobacco: Never Used   Substance Use Topics    Alcohol use: No     Alcohol/week: 0.0 oz    Drug use: No       Allergies: Allergies   Allergen Reactions    Penicillins Rash         Review of Systems   Review of Systems   Constitutional: Negative for activity change. HENT: Negative for facial swelling. Eyes: Positive for pain and redness. Negative for visual disturbance. All other systems reviewed and are negative. Physical Exam     Vitals:    19 1536   BP: 126/71   Pulse: 89   Resp: 15   Temp: 98.3 °F (36.8 °C)   SpO2: 100%   Weight: 56.7 kg (125 lb)   Height: 5' 3\" (1.6 m)     Physical Exam   Constitutional: She appears well-developed and well-nourished. No distress. AA female in NAD. Alert. Appears comfortable. HENT:   Head: Normocephalic and atraumatic. Head is without right periorbital erythema and without left periorbital erythema. Right Ear: External ear normal.   Left Ear: External ear normal.   Nose: Nose normal.   Eyes: Pupils are equal, round, and reactive to light. EOM and lids are normal. Lids are everted and swept, no foreign bodies found. Right eye exhibits no chemosis, no discharge, no exudate and no hordeolum.  No foreign body present in the right eye. Left eye exhibits no chemosis, no discharge, no exudate and no hordeolum. No foreign body present in the left eye. Right conjunctiva is injected (trace). Right conjunctiva has no hemorrhage. Left conjunctiva is not injected. Left conjunctiva has no hemorrhage. Neck: Normal range of motion. Cardiovascular: Normal rate, regular rhythm, normal heart sounds and intact distal pulses. Pulmonary/Chest: Effort normal and breath sounds normal.   Lymphadenopathy:        Head (right side): No preauricular and no posterior auricular adenopathy present. Head (left side): No preauricular and no posterior auricular adenopathy present. Skin: She is not diaphoretic. Nursing note and vitals reviewed. Diagnostic Study Results     Labs -   No results found for this or any previous visit (from the past 12 hour(s)). No orders to display     CT Results  (Last 48 hours)    None        CXR Results  (Last 48 hours)    None          Medications given in the ED-  Medications   proparacaine (OPTHAINE) 0.5 % ophthalmic solution 1 Drop (1 Drop Right Eye Given 6/11/19 1635)   fluorescein (FUL-STEVEN) 1 mg ophthalmic strip 1 Strip (1 Strip Right Eye Given 6/11/19 1634)         Medical Decision Making   I am the first provider for this patient. I reviewed the vital signs, available nursing notes, past medical history, past surgical history, family history and social history. Vital Signs-Reviewed the patient's vital signs. Pulse Oximetry Analysis - 100% on RA     Records Reviewed: Nursing Notes    Provider Notes (Medical Decision Making): conjunctivitis, retained FB, corneal abrasion, corneal ulceration    Procedures:  Eye Stain    Date/Time: 6/11/2019 4:31 PM      Supervising provider: Dr. Saira Maier        Corneal abrasion was present on eyelid eversion. Right eye location: 6 o'clock  Cornea is clear. Anterior chamber is clear.     Patient tolerance: Patient tolerated the procedure well with no immediate complications  My total time at bedside, performing this procedure was 1-15 minutes. ED Course:   3:51 PM Initial assessment performed. The patients presenting problems have been discussed, and they are in agreement with the care plan formulated and outlined with them. I have encouraged them to ask questions as they arise throughout their visit. Diagnosis and Disposition       Visual acuity intact. PERRL. EOMI. No FB with upper eyelid flipped. Will tx for small corneal abrasion. No contact lenses use. Optho FU. Reasons to RTED discussed with pt. All questions answered. Pt feels comfortable going home at this time. Pt expressed understanding and she agrees with plan. 1. Right corneal abrasion, initial encounter        PLAN:  1. D/C Home  2. Discharge Medication List as of 6/11/2019  5:29 PM      START taking these medications    Details   erythromycin (ILOTYCIN) ophthalmic ointment Apply thin line (1.25 cm) to left eye every 4 hours x 7 days, Print, Disp-1 Tube, R-0         CONTINUE these medications which have NOT CHANGED    Details   albuterol (PROVENTIL HFA, VENTOLIN HFA) 90 mcg/actuation inhaler Take 2 Puffs by inhalation every six (6) hours as needed., Historical Med      fluticasone (FLOVENT HFA) 44 mcg/actuation inhaler Take 1 Puff by inhalation two (2) times a day.  , Historical Med      ondansetron (ZOFRAN ODT) 8 mg disintegrating tablet Take 1 Tab by mouth every eight (8) hours as needed for Nausea. , Print, Disp-10 Tab, R-0           3. Follow-up Information     Follow up With Specialties Details Why Contact Info    Shavon Maurer MD Ophthalmology   [address] [facility] [address] [phone]      THE [facility] EMERGENCY DEPT Emergency Medicine  As needed, If symptoms worsen [address] [id number] [phone]        _______________________________    Attestations:   This note is prepared by Rohit Fernandez RAYMUNDO.  _______________________________        Please note that this dictation was completed with [employer], the computer voice recognition software. Quite often unanticipated grammatical, syntax, homophones, and other interpretive errors are inadvertently transcribed by the computer software. Please disregard these errors. Please excuse any errors that have escaped final proofreading.

## 2019-06-11 NOTE — ED TRIAGE NOTES
Patient ambulatory into ER c/o right eye pain since approx 1030 today. 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

## 2019-06-26 ENCOUNTER — OFFICE VISIT (OUTPATIENT)
Dept: ORTHOPEDIC SURGERY | Age: 54
End: 2019-06-26

## 2019-06-26 VITALS
RESPIRATION RATE: 17 BRPM | OXYGEN SATURATION: 99 % | HEIGHT: 63 IN | BODY MASS INDEX: 23.78 KG/M2 | WEIGHT: 134.2 LBS | SYSTOLIC BLOOD PRESSURE: 136 MMHG | TEMPERATURE: 98.1 F | HEART RATE: 99 BPM | DIASTOLIC BLOOD PRESSURE: 88 MMHG

## 2019-06-26 DIAGNOSIS — M79.672 LEFT FOOT PAIN: ICD-10-CM

## 2019-06-26 DIAGNOSIS — M25.572 CHRONIC PAIN OF LEFT ANKLE: ICD-10-CM

## 2019-06-26 DIAGNOSIS — M76.62 ACHILLES TENDINITIS OF LEFT LOWER EXTREMITY: Primary | ICD-10-CM

## 2019-06-26 DIAGNOSIS — G89.29 CHRONIC PAIN OF LEFT ANKLE: ICD-10-CM

## 2019-06-26 RX ORDER — MELOXICAM 15 MG/1
15 TABLET ORAL
Qty: 30 TAB | Refills: 0 | Status: SHIPPED | OUTPATIENT
Start: 2019-06-26 | End: 2021-03-10

## 2019-06-26 RX ORDER — IBUPROFEN 800 MG/1
TABLET ORAL
COMMUNITY
End: 2019-06-26

## 2019-06-26 RX ORDER — FAMOTIDINE 40 MG/1
40 TABLET, FILM COATED ORAL DAILY
Qty: 30 TAB | Refills: 0 | Status: SHIPPED | OUTPATIENT
Start: 2019-06-26 | End: 2021-03-10

## 2019-06-26 NOTE — PATIENT INSTRUCTIONS
Achilles Tendinitis: Exercises     Complete at least 2 sessions of each exercise each day to get started (no days off). If beneficial and able to fit into your schedule, more sessions are recommended. Nothing should cause an increase in pain or swelling. Towel stretch (calf)    1. Sit with your legs extended and knees straight. 2. Place a towel around your foot just under the toes. 3. Hold each end of the towel in each hand, with your hands above your knees. 4. Pull back with the towel so that your foot stretches toward you. 5. Hold the position for 30 seconds. 6. Repeat 3 times a session. 7. When 3 sets are completed, slightly bend the knee by placing a towel or pillow under it and completing 3 more sets per leg. 8. If stretch becomes too easy and you are no longer able to feel a stretch, discontinue exercise in favor of standing stretches. Wall stretch (calf)    1. Stand facing a wall with your hands on the wall at about eye level. Put your affected leg about a step behind your other leg. 2. Keeping your back leg straight and your back heel on the floor, bend your front knee and gently bring your hip and chest toward the wall until you feel a stretch in the calf of your back leg. 3. Hold the stretch for 30 seconds. 4. Repeat 3 times per leg. 5. Repeat steps 1 through 4, but this time keep your back knee bent. Stair stretch (calf)    1. Stand with the balls of both feet on the edge of a step or curb. With at least one hand, hold onto something solid for balance, such as a banister or handrail. 2. Keeping your knees straight, slowly let the heels hang down off of the step or curb until you feel a stretch in the back of your calf and/or Achilles area. 3. Hold this position for 30 seconds. 4. Repeat 3 times a session. This stretch should be repeated with your knees slightly bent. If too easy, progress to one leg at a time.    5. If stretch is too painful initially,  front of the step and stretch one foot at a time, using the step to push your foot back toward your shin and follow same parameters. Heel raises (eccentric emphasis)    1. Stand on a step with your heel off the edge of the step. Hold on to a handrail or wall for balance. 2. Push up on your toes, then slowly count to 5 as you lower yourself back down until your heel is below the step. If it hurts to push up on your toes, try putting most of your weight on your other foot as you push up, or try using your arms to help you. If you can't do this exercise without causing pain, stop the exercise. 3. Repeat 10 times for 3 sets. If you have any questions, please call [facility] and ask for Yoko Velarde Certified Athletic Trainer.

## 2019-06-26 NOTE — PROGRESS NOTES
AMBULATORY PROGRESS NOTE      Patient: Laqueta Bosworth             MRN: [id number]     SSN: [id number] Body mass index is 23.77 kg/m². YOB: 1965     AGE: 53 y.o. SEX: female    PCP: Chantelle Leon NP     IMPRESSION/DIAGNOSIS AND TREATMENT PLAN     DIAGNOSES    1. Achilles tendinitis of left lower extremity    2. Chronic pain of left ankle    3. Left foot pain        Orders Placed This Encounter    AMB SUPPLY ORDER    [27579] Ankle 2V    [17219] Foot Min 3V    meloxicam (MOBIC) 15 mg tablet    famotidine (PEPCID) 40 mg tablet      Laqueta Bosworth understands her diagnoses and the proposed plan. Plan:    1) DME Order: Short left CAM walker boot with Visco heel. 2) Mobic 15 m PO every day; 30 tablets, 0 refills. 3) Pepcid 40 m PO every day; 30 tablets, 0 refills. 4) AGGIE Medina ATC, has educated the patient on Achilles tendon exercises and/or stretches. 5) Continue activity modification as directed. 6) Anticipate referral to PT if condition does not improve. RTO - 4 weeks     HPI AND EXAMINATION     Laqueta Bosworth IS A 53 y.o. female who presents to my outpatient office for a problem visit of the left foot. Ms. Katlyn Redd reports that her left ankle has been giving out on her for the past week. She rates her pain at an 8 out of 10. The patient denies any recent falls, twists, or trauma. She notes that she did not change her activity level. She also notes that she has been experiencing pain over her left Achilles tendon and across her anterior ankle. The patient works as a  and has to wear steel toed shoes at work. She reports that she wears rubber inserts in her work shoes. She notes that there are no light duty positions for her work.      Visit Vitals  /88   Pulse 99   Temp 98.1 °F (36.7 °C) (Oral)   Resp 17   Ht 5' 3\" (1.6 m)   Wt 134 lb 3.2 oz (60.9 kg)   SpO2 99%   BMI 23.77 kg/m²     Appearance: Alert, well appearing and pleasant patient who is in no distress, oriented to person, place/time, and who follows commands. This patient is accompanied in the examination room by her self. Dementia: no dementia  Psychiatric: Affect and mood are appropriate. Patient arrives to office via: without assistive device  HEENT: Head normocephalic & atraumatic. Both pupils are round, non icteric sclera   Eye: EOM are intact and sclera are clear    Neck: ROM WNL and JVD neck is not present     Hearings Intact, does not require hearing aid device  Respiratory: Breathing is unlabored without accessory chest muscle use  Cardiovascular/Peripheral Vascular: Normal Pulses to each foot    Left ACHILLES GASTROCNEMIUS COMPLEX,PLANTAR FASCIA    Gait: antalgic  Tenderness: NO to Achilles tendon sheath Insertional point    Mild tenderness to the insertional point of the posterior tibial tendon. YES Noninsertional Achilles tendon tenderness   Calf tenderness: Absent for calf or gastrocnemius muscle regions   Soft, supple, non tender, non taut lower extremity compartments   NONE to Medial Malleolar, 4/5 Met base midfoot, achilles, tib post, or   NONE to syndesmosis. no to plantar fascia, or central calcaneal region  Deformity/Swelling:  NO  Insertional point of Distal Achilles Tendon:    NO Fusiform noninsertional focal tendinopathy   NO Zoe's Deformity Present  Cutaneous: No rashes, skin patches, wounds, or abrasions to the lower legs           Warm and Normal color. No regions of expressible drainage. Medial Border of Tibia Region: absent           Skin color, texture, turgor normal. Normal.  Joint Motion: WNL  Neurologic Exam: Neuro: Motor: normal 5/5 strength in all tested muscle groups and Sensory : no sensory deficits noted. No abnormal hand/wrist, foot/ankle, or facial/neck tremors. Contractures: Gastrocnemius or Achilles Contractures absent.    Joint / Tendon Stability:    No anterolateral or varus instability of the Ankle or Subtalar Joints              No peroneal tendon instability present with ankle circumduction  Alignment: Slight Pes Planus and  Flexible  Vascular: Normal Pulses/ NL Capillary refill, No evidence of DVT seen on physical exam.   No calf swelling, no tenderness to calf. Varicosities Lower Limbs :none  Lymphatic:  No Evidence of Lymphedema    CHART REVIEW     Past Medical History:   Diagnosis Date    Appetite loss 2013    Arthritis     in back    Asthma 2006    Back pain 8/31/2012    Chest pain     Degenerative disc disease, lumbar     DUB (dysfunctional uterine bleeding)     Foot pain, left     GERD (gastroesophageal reflux disease)     Headache(784.0)     migraine variant    Irregular heartbeat     Kidney calculus     Kidney stone     Menorrhagia     Pelvic pain in female     Postlaminectomy syndrome     S/P ankle ligament repair, left 2/7/2014    Stroke ([facility] [address] 1988, 1996, 1998    x3 - no residual    Tobacco abuse     Trichomonas     UTI (lower urinary tract infection)     Wears glasses 2007     Current Outpatient Medications   Medication Sig    meloxicam (MOBIC) 15 mg tablet Take 1 Tab by mouth daily (with breakfast).  famotidine (PEPCID) 40 mg tablet Take 1 Tab by mouth daily.  ondansetron (ZOFRAN ODT) 8 mg disintegrating tablet Take 1 Tab by mouth every eight (8) hours as needed for Nausea.  albuterol (PROVENTIL HFA, VENTOLIN HFA) 90 mcg/actuation inhaler Take 2 Puffs by inhalation every six (6) hours as needed.  fluticasone (FLOVENT HFA) 44 mcg/actuation inhaler Take 1 Puff by inhalation two (2) times a day. No current facility-administered medications for this visit.       Allergies   Allergen Reactions    Penicillins Rash     Past Surgical History:   Procedure Laterality Date    HX LUMBAR FUSION  2012    L4-L5    HX ORTHOPAEDIC Left 02/07/2014    ligament reconstruction    HX TONSILLECTOMY      HX TUBAL LIGATION  1996    x2     Social History     Occupational History    Not on file Tobacco Use    Smoking status: Current Every Day Smoker     Packs/day: 0.00     Years: 33.00     Pack years: 0.00     Last attempt to quit: 2013     Years since quittin.5    Smokeless tobacco: Never Used   Substance and Sexual Activity    Alcohol use: No     Alcohol/week: 0.0 oz    Drug use: No    Sexual activity: Yes     Partners: Male     Birth control/protection: Surgical     Family History   Problem Relation Age of Onset    Heart Disease Mother     Hypertension Mother     Stroke Mother     Hypertension Father     Hypertension Sister     Diabetes Sister         REVIEW OF SYSTEMS : 2019  ALL BELOW ARE Negative except : SEE HPI     Constitutional: Negative for fever, chills and weight loss. Neg Weight Loss  Cardiovascular: Negative for chest pain, claudication and leg swelling. SOB, GAMEZ   Gastrointestinal/Urological: Negative for  pain, N/V/D/C, Blood in stool or urine,dysuria                         Hematuria, Incontinence, pelvic pain  Musculoskeletal: see HPI. Neurological: Negative for dizziness and weakness, headaches,Visual Changes             Confusion,  Or Seizures,   Psychiatric/Behavioral: Negative for depression, memory loss and substance abuse. Extremities:  Negative for hair changes, rash or skin lesion changes. Hematologic: Negative for Bleeding problems, bruising, pallor or swollen lymph nodes.   Peripheral Vascular: No calf pain, vascular vein tenderness to calf pain              No calf throbbing, posterior knee throbbing pain     DIAGNOSTIC IMAGING     ANKLE X RAYS 3 VIEWS Left   X RAYS AT [address] [place] [address]  2019     NON WEIGHT BEARING    SOFT TISSUES:              mild fibula region, mild medial aspect       mild dorsal midfoot/forefoot       No Ankle joint effusion seen       Soft tissue calcifications not present,        Calcified blood vessels not present    OSSEOUS:         No fractures, subluxations, dislocations       No Osteochondral defects seen  Mineralization: suggests Normal Bone  Bone Spurs No significant bone spurs   ALIGNMENT:    Ankle mortise alignment is: Congruent   Ankle mortise alignment is congruent. Tibial plafond and talar dome intact        I have personally reviewed these images of the above study. The interpretation of this study is my professional opinion    Olayinka Saunders MD  6/26/2019  6:11 PM      FOOT X RAYS 3 VIEWS Left   6/26/2019    NON WEIGHT BEARING    X RAYS AT [facility] [address] [place] [address]  6/26/2019    {Left FOOT:     Bones: No fractures or dislocations. No focal osteolytic or osteoblastic process  Bone Spurs No significant bone spurs   Alignment: Deformity Location: None present  Joint Condition: Moderate OA changes present   JOINT SPACE NARROWING AND OA AT 2 and 3 TMT JOINT SPACES   Soft Tissues Mild swelling dorsal midfoot   No ankle joint effusion in lateral projection. Mineralization: suggests Normal Bone    I have personally reviewed the results of the above study. The interpretation of this study is my professional opinion      Written by Alanna Sharpe, as dictated by Dr. Rachel Arthur. I, Dr. Rachel Arthur, confirm that all documentation is accurate.

## 2019-06-26 NOTE — PROGRESS NOTES
1. Have you been to the ER, urgent care clinic since your last visit? Hospitalized since your last visit? No    2. Have you seen or consulted any other health care providers outside of the [address] [place] [address] since your last visit? Include any pap smears or colon screening.  No

## 2019-07-09 ENCOUNTER — OFFICE VISIT (OUTPATIENT)
Dept: ORTHOPEDIC SURGERY | Age: 54
End: 2019-07-09

## 2019-07-09 VITALS
HEIGHT: 63 IN | OXYGEN SATURATION: 97 % | DIASTOLIC BLOOD PRESSURE: 83 MMHG | TEMPERATURE: 97.5 F | RESPIRATION RATE: 16 BRPM | SYSTOLIC BLOOD PRESSURE: 139 MMHG | HEART RATE: 87 BPM | BODY MASS INDEX: 23.04 KG/M2 | WEIGHT: 130 LBS

## 2019-07-09 DIAGNOSIS — M54.50 LUMBAR PAIN: Primary | ICD-10-CM

## 2019-07-09 DIAGNOSIS — S39.012A BACK STRAIN, INITIAL ENCOUNTER: ICD-10-CM
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
## 2019-07-09 RX ORDER — GABAPENTIN 300 MG/1
CAPSULE ORAL
Qty: 90 CAP | Refills: 1 | Status: SHIPPED | OUTPATIENT
Start: 2019-07-09 | End: 2021-01-27 | Stop reason: SDUPTHER

## 2019-07-09 RX ORDER — KETOROLAC TROMETHAMINE 15 MG/ML
30 INJECTION, SOLUTION INTRAMUSCULAR; INTRAVENOUS ONCE
Qty: 1 VIAL | Refills: 0
Start: 2019-07-09 | End: 2019-07-09

## 2019-07-09 RX ORDER — METHOCARBAMOL 500 MG/1
500 TABLET, FILM COATED ORAL
COMMUNITY
Start: 2019-07-02 | End: 2019-09-09 | Stop reason: SDUPTHER

## 2019-07-09 RX ORDER — NAPROXEN 500 MG/1
500 TABLET ORAL
COMMUNITY
Start: 2019-07-02 | End: 2021-03-10

## 2019-07-09 RX ORDER — METHYLPREDNISOLONE 4 MG/1
TABLET ORAL
Qty: 1 DOSE PACK | Refills: 0 | Status: SHIPPED | OUTPATIENT
Start: 2019-07-09 | End: 2021-01-27 | Stop reason: SDUPTHER
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
## 2019-07-09 NOTE — PATIENT INSTRUCTIONS
Back Strain: Care Instructions  Overview    A back strain happens when you overstretch, or pull, a muscle in your back. You may hurt your back in an accident or when you exercise or lift something. Sometimes you may not know how you hurt your back. Most back pain will get better with rest and time. You can take care of yourself at home to help your back heal.  Follow-up care is a key part of your treatment and safety. Be sure to make and go to all appointments, and call your doctor if you are having problems. It's also a good idea to know your test results and keep a list of the medicines you take. How can you care for yourself at home? · Try to stay as active as you can, but stop or reduce any activity that causes pain. · Put ice or a cold pack on the sore muscle for 10 to 20 minutes at a time to stop swelling. Try this every 1 to 2 hours for 3 days (when you are awake) or until the swelling goes down. Put a thin cloth between the ice pack and your skin. · After 2 or 3 days, apply a heating pad on low or a warm cloth to your back. Some doctors suggest that you go back and forth between hot and cold treatments. · Take pain medicines exactly as directed. ? If the doctor gave you a prescription medicine for pain, take it as prescribed. ? If you are not taking a prescription pain medicine, ask your doctor if you can take an over-the-counter medicine. · Try sleeping on your side with a pillow between your legs. Or put a pillow under your knees when you lie on your back. These measures can ease pain in your lower back. · Return to your usual level of activity slowly. When should you call for help? Call 911 anytime you think you may need emergency care. For example, call if:    · You are unable to move a leg at all.   [facility] your doctor now or seek immediate medical care if:    · You have new or worse symptoms in your legs, belly, or buttocks.  Symptoms may include:  ? Numbness or tingling. ? Weakness. ? Pain.     · You lose bladder or bowel control.    Watch closely for changes in your health, and be sure to contact your doctor if:    · You have a fever, lose weight, or don't feel well.     · You are not getting better as expected. Where can you learn more? Go to http://ilsa-bolivar.info/. Enter P711 in the search box to learn more about \"Back Strain: Care Instructions. \"  Current as of: September 20, 2018  Content Version: 11.9  © 1463-3412 [employer]. Care instructions adapted under license by [employer] (which disclaims liability or warranty for this information). If you have questions about a medical condition or this instruction, always ask your healthcare professional. [address] any warranty or liability for your use of this information. Back Stretches: Exercises  Your Care Instructions  Here are some examples of exercises for stretching your back. Start each exercise slowly. Ease off the exercise if you start to have pain. Your doctor or physical therapist will tell you when you can start these exercises and which ones will work best for you. How to do the exercises  Overhead stretch    1. Stand comfortably with your feet shoulder-width apart. 2. Looking straight ahead, raise both arms over your head and reach toward the ceiling. Do not allow your head to tilt back. 3. Hold for 15 to 30 seconds, then lower your arms to your sides. 4. Repeat 2 to 4 times. Side stretch    1. Stand comfortably with your feet shoulder-width apart. 2. Raise one arm over your head, and then lean to the other side. 3. Slide your hand down your leg as you let the weight of your arm gently stretch your side muscles. Hold for 15 to 30 seconds. 4. Repeat 2 to 4 times on each side. Press-up    1. Lie on your stomach, supporting your body with your forearms.   2. Press your elbows down into the floor to raise your upper back. As you do this, relax your stomach muscles and allow your back to arch without using your back muscles. As your press up, do not let your hips or pelvis come off the floor. 3. Hold for 15 to 30 seconds, then relax. 4. Repeat 2 to 4 times. Relax and rest    1. Lie on your back with a rolled towel under your neck and a pillow under your knees. Extend your arms comfortably to your sides. 2. Relax and breathe normally. 3. Remain in this position for about 10 minutes. 4. If you can, do this 2 or 3 times each day. Follow-up care is a key part of your treatment and safety. Be sure to make and go to all appointments, and call your doctor if you are having problems. It's also a good idea to know your test results and keep a list of the medicines you take. Where can you learn more? Go to http://ilsa-bolivar.info/. Enter F449 in the search box to learn more about \"Back Stretches: Exercises. \"  Current as of: September 20, 2018  Content Version: 11.9  © 3946-9446 [employer]. Care instructions adapted under license by [employer] (which disclaims liability or warranty for this information). If you have questions about a medical condition or this instruction, always ask your healthcare professional. [address] any warranty or liability for your use of this information.

## 2019-07-09 NOTE — LETTER
NOTIFICATION RETURN TO WORK / SCHOOL 
 
7/9/2019 11:08 AM 
 
[facility] [address] [place] [id number] To Whom It May Concern: 
 
Jo Clifton is currently under the care of [facility]. She will return to work/school on: 7/10/19. If there are questions or concerns please have the patient contact our office. Sincerely, Zunilda Gonzalez NP

## 2019-07-10 ENCOUNTER — TELEPHONE (OUTPATIENT)
Dept: ORTHOPEDIC SURGERY | Age: 54
End: 2019-07-10

## 2019-07-10 NOTE — TELEPHONE ENCOUNTER
Patient seen yesterday by Jorge Peña want to go to the [address] on [place] location. She has been to this location before. Please send order to them.  Please call patient back at [phone]

## 2019-07-17 ENCOUNTER — TELEPHONE (OUTPATIENT)
Dept: ORTHOPEDIC SURGERY | Age: 54
End: 2019-07-17

## 2019-07-17 NOTE — TELEPHONE ENCOUNTER
The gabapentin can take time to become effective and the dose may need to be increased. We will not be gicing pain medications due to state regulations. She should continue tapering up on gabapentin and continue with PT, ice and heat.

## 2019-07-17 NOTE — TELEPHONE ENCOUNTER
Patient called in requesting pain medication, states that her Gabapentin is not working. She can be reached at [phone] patient uses [address]

## 2019-07-18 NOTE — TELEPHONE ENCOUNTER
Called patient three times and she answers then hangs up on each attempt. If patient calls back and I am unavailable please inform her of documentation below. Thank You. Patient called back, name and  were both verified. Patient states that she's taken Gabapentin before and \"it isn't doing lady\" she informed me that she is going to take her pain medication along with this and that we should be looking out for her PT Request form and to provide a doctor's signature and hung up. Closing encounter.

## 2019-07-19 ENCOUNTER — TELEPHONE (OUTPATIENT)
Dept: ORTHOPEDIC SURGERY | Age: 54
End: 2019-07-19

## 2019-07-19 NOTE — LETTER
NOTIFICATION RETURN TO WORK / SCHOOL 
 
7/19/2019 12:31 PM 
 
Ms. Monta Carrel [address] [address] To Whom It May Concern: 
 
Monta Carrel is currently under the care of [facility]. She will remain out of work until next follow-up on 09/09/19. If there are questions or concerns please have the patient contact our office. Sincerely, Jeff Kebede NP

## 2019-07-19 NOTE — TELEPHONE ENCOUNTER
Call [facility] and see if they can get her started with PT and change her work note to August 5 to return to work.

## 2019-07-19 NOTE — TELEPHONE ENCOUNTER
She needs a new work note to state she will be out of work for the next 2 weeks while doing physical therapy. She will then need to see you for follow up and get released to go back to work. She states her work will not allow her to come to work as long as she is in therapy and under the care of a physician because it was not work related and she can not do her normal/usual work. She can not afford to stay out any longer than 2 weeks so she will do HEP after the 2 weeks of therapy. Please call  when note ready for .

## 2019-07-22 NOTE — TELEPHONE ENCOUNTER
[facility] has scheduled her an appt for this week and I fixed her work not to go back to work on 08-05-19 per Charan Justin

## 2019-07-25 ENCOUNTER — HOSPITAL ENCOUNTER (OUTPATIENT)
Dept: PHYSICAL THERAPY | Age: 54
Discharge: HOME | End: 2019-07-25
Payer: MEDICAID

## 2019-07-25 PROCEDURE — 97163 PT EVAL HIGH COMPLEX 45 MIN: CPT | Performed by: PHYSICAL THERAPIST

## 2019-07-25 PROCEDURE — 97530 THERAPEUTIC ACTIVITIES: CPT | Performed by: PHYSICAL THERAPIST

## 2019-07-25 PROCEDURE — 97112 NEUROMUSCULAR REEDUCATION: CPT | Performed by: PHYSICAL THERAPIST

## 2019-07-25 PROCEDURE — 97110 THERAPEUTIC EXERCISES: CPT | Performed by: PHYSICAL THERAPIST
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
## 2019-07-26 ENCOUNTER — HOSPITAL ENCOUNTER (EMERGENCY)
Age: 54
Discharge: HOME | End: 2019-07-26
Attending: EMERGENCY MEDICINE | Admitting: EMERGENCY MEDICINE
Payer: MEDICAID

## 2019-07-26 VITALS
WEIGHT: 120 LBS | BODY MASS INDEX: 21.26 KG/M2 | SYSTOLIC BLOOD PRESSURE: 138 MMHG | RESPIRATION RATE: 14 BRPM | OXYGEN SATURATION: 99 % | HEIGHT: 63 IN | TEMPERATURE: 98.6 F | DIASTOLIC BLOOD PRESSURE: 86 MMHG | HEART RATE: 100 BPM

## 2019-07-26 DIAGNOSIS — E87.6 HYPOKALEMIA: Primary | ICD-10-CM

## 2019-07-26 DIAGNOSIS — R10.9 LEFT LATERAL ABDOMINAL PAIN: ICD-10-CM

## 2019-07-26 LAB
ALBUMIN SERPL-MCNC: 3.8 G/DL (ref 3.4–5)
ALBUMIN/GLOB SERPL: 1.4 {RATIO} (ref 0.8–1.7)
ALP SERPL-CCNC: 83 U/L (ref 45–117)
ALT SERPL-CCNC: 28 U/L (ref 13–56)
ANION GAP SERPL CALC-SCNC: 5 MMOL/L (ref 3–18)
APPEARANCE UR: CLEAR
AST SERPL-CCNC: 22 U/L (ref 10–38)
BACTERIA URNS QL MICRO: ABNORMAL /HPF
BASOPHILS # BLD: 0 K/UL (ref 0–0.1)
BASOPHILS NFR BLD: 0 % (ref 0–2)
BILIRUB SERPL-MCNC: 0.7 MG/DL (ref 0.2–1)
BILIRUB UR QL: NEGATIVE
BUN SERPL-MCNC: 21 MG/DL (ref 7–18)
BUN/CREAT SERPL: 29 (ref 12–20)
CALCIUM SERPL-MCNC: 8.9 MG/DL (ref 8.5–10.1)
CHLORIDE SERPL-SCNC: 113 MMOL/L (ref 100–111)
CO2 SERPL-SCNC: 27 MMOL/L (ref 21–32)
COLOR UR: YELLOW
CREAT SERPL-MCNC: 0.73 MG/DL (ref 0.6–1.3)
DIFFERENTIAL METHOD BLD: NORMAL
EOSINOPHIL # BLD: 0.1 K/UL (ref 0–0.4)
EOSINOPHIL NFR BLD: 2 % (ref 0–5)
EPITH CASTS URNS QL MICRO: ABNORMAL /LPF (ref 0–5)
ERYTHROCYTE [DISTWIDTH] IN BLOOD BY AUTOMATED COUNT: 13.8 % (ref 11.6–14.5)
GLOBULIN SER CALC-MCNC: 2.7 G/DL (ref 2–4)
GLUCOSE SERPL-MCNC: 77 MG/DL (ref 74–99)
GLUCOSE UR STRIP.AUTO-MCNC: NEGATIVE MG/DL
HCT VFR BLD AUTO: 39.2 % (ref 35–45)
HGB BLD-MCNC: 13.4 G/DL (ref 12–16)
HGB UR QL STRIP: ABNORMAL
KETONES UR QL STRIP.AUTO: ABNORMAL MG/DL
LEUKOCYTE ESTERASE UR QL STRIP.AUTO: NEGATIVE
LIPASE SERPL-CCNC: 80 U/L (ref 73–393)
LYMPHOCYTES # BLD: 2.1 K/UL (ref 0.9–3.6)
LYMPHOCYTES NFR BLD: 35 % (ref 21–52)
MCH RBC QN AUTO: 31.5 PG (ref 24–34)
MCHC RBC AUTO-ENTMCNC: 34.2 G/DL (ref 31–37)
MCV RBC AUTO: 92 FL (ref 74–97)
MONOCYTES # BLD: 0.5 K/UL (ref 0.05–1.2)
MONOCYTES NFR BLD: 9 % (ref 3–10)
NEUTS SEG # BLD: 3.3 K/UL (ref 1.8–8)
NEUTS SEG NFR BLD: 54 % (ref 40–73)
NITRITE UR QL STRIP.AUTO: NEGATIVE
PH UR STRIP: 5.5 [PH] (ref 5–8)
PLATELET # BLD AUTO: 161 K/UL (ref 135–420)
PMV BLD AUTO: 10.1 FL (ref 9.2–11.8)
POTASSIUM SERPL-SCNC: 3.3 MMOL/L (ref 3.5–5.5)
PROT SERPL-MCNC: 6.5 G/DL (ref 6.4–8.2)
PROT UR STRIP-MCNC: NEGATIVE MG/DL
RBC # BLD AUTO: 4.26 M/UL (ref 4.2–5.3)
RBC #/AREA URNS HPF: ABNORMAL /HPF (ref 0–5)
SODIUM SERPL-SCNC: 145 MMOL/L (ref 136–145)
SP GR UR REFRACTOMETRY: 1.03 (ref 1–1.03)
UROBILINOGEN UR QL STRIP.AUTO: 1 EU/DL (ref 0.2–1)
WBC # BLD AUTO: 6 K/UL (ref 4.6–13.2)
WBC URNS QL MICRO: ABNORMAL /HPF (ref 0–4)

## 2019-07-26 PROCEDURE — 81001 URINALYSIS AUTO W/SCOPE: CPT

## 2019-07-26 PROCEDURE — 80053 COMPREHEN METABOLIC PANEL: CPT

## 2019-07-26 PROCEDURE — 96360 HYDRATION IV INFUSION INIT: CPT

## 2019-07-26 PROCEDURE — 85025 COMPLETE CBC W/AUTO DIFF WBC: CPT

## 2019-07-26 PROCEDURE — 83690 ASSAY OF LIPASE: CPT

## 2019-07-26 PROCEDURE — 74011250636 HC RX REV CODE- 250/636: Performed by: EMERGENCY MEDICINE

## 2019-07-26 PROCEDURE — 74011250637 HC RX REV CODE- 250/637: Performed by: EMERGENCY MEDICINE

## 2019-07-26 PROCEDURE — 87086 URINE CULTURE/COLONY COUNT: CPT

## 2019-07-26 PROCEDURE — 99283 EMERGENCY DEPT VISIT LOW MDM: CPT

## 2019-07-26 RX ORDER — POTASSIUM CHLORIDE 20 MEQ/1
40 TABLET, EXTENDED RELEASE ORAL
Status: COMPLETED | OUTPATIENT
Start: 2019-07-26 | End: 2019-07-26

## 2019-07-26 RX ORDER — DICYCLOMINE HYDROCHLORIDE 10 MG/1
10 CAPSULE ORAL 4 TIMES DAILY
Qty: 20 CAP | Refills: 0 | Status: SHIPPED | OUTPATIENT
Start: 2019-07-26 | End: 2019-07-31

## 2019-07-26 RX ADMIN — POTASSIUM CHLORIDE 40 MEQ: 20 TABLET, EXTENDED RELEASE ORAL at 20:43

## 2019-07-26 RX ADMIN — SODIUM CHLORIDE 1000 ML: 900 INJECTION, SOLUTION INTRAVENOUS at 19:24

## 2019-07-26 NOTE — ED PROVIDER NOTES
EMERGENCY DEPARTMENT HISTORY AND PHYSICAL EXAM    Date: 7/26/2019  Patient Name: Robe Jaimes    History of Presenting Illness     Chief Complaint   Patient presents with    Flank Pain         History Provided By: Patient    Additional History (Context): Robe Jaimes is a 53 y.o. female with osteoarthritis, asthma and Kidney stones who presents with 1 week of left lateral abdominal flank pain. Denies any nausea vomiting. Is having normal formed stools save one day where she had some loose nonbloody stools. Denies any fever or per se dysuria. She thinks she is dehydrated. Patient says that she has a history of kidney stones but she has not passed one in a while and this does not remind her of that. Has never had a colonoscopy before. Has had surgical 3 times bilateral tubal ligation. Is down to smoking from 1-1/2 packs a day to 5 cigarettes daily. Denies any alcohol. PCP: Rubi Mcwilliams NP    Current Facility-Administered Medications   Medication Dose Route Frequency Provider Last Rate Last Dose    potassium chloride (K-DUR, KLOR-CON) SR tablet 40 mEq  40 mEq Oral NOW Mee Parson PA         Current Outpatient Medications   Medication Sig Dispense Refill    dicyclomine (BENTYL) 10 mg capsule Take 1 Cap by mouth four (4) times daily for 5 days. 20 Cap 0    methocarbamol (ROBAXIN) 500 mg tablet Take 500 mg by mouth.  naproxen (NAPROSYN) 500 mg tablet Take 500 mg by mouth.  methylPREDNISolone (MEDROL, MEERA,) 4 mg tablet Per dose pack instructions 1 Dose Pack 0    gabapentin (NEURONTIN) 300 mg capsule Take 1 Tab QHS x1 week, then BID x1 week, then TID  Indications: Neuropathic Pain 90 Cap 1    meloxicam (MOBIC) 15 mg tablet Take 1 Tab by mouth daily (with breakfast). 30 Tab 0    famotidine (PEPCID) 40 mg tablet Take 1 Tab by mouth daily. 30 Tab 0    ondansetron (ZOFRAN ODT) 8 mg disintegrating tablet Take 1 Tab by mouth every eight (8) hours as needed for Nausea.  10 Tab 0  albuterol (PROVENTIL HFA, VENTOLIN HFA) 90 mcg/actuation inhaler Take 2 Puffs by inhalation every six (6) hours as needed.  fluticasone (FLOVENT HFA) 44 mcg/actuation inhaler Take 1 Puff by inhalation two (2) times a day. Past History     Past Medical History:  Past Medical History:   Diagnosis Date    Appetite loss     Arthritis     in back    Asthma 2006    Back pain 2012    Chest pain     Degenerative disc disease, lumbar     DUB (dysfunctional uterine bleeding)     Foot pain, left     GERD (gastroesophageal reflux disease)     Headache(784.0)     migraine variant    Irregular heartbeat     Kidney calculus     Kidney stone     Menorrhagia     Pelvic pain in female     Postlaminectomy syndrome     S/P ankle ligament repair, left 2014    Stroke ([address] , , 1998    x3 - no residual    Tobacco abuse     Trichomonas     UTI (lower urinary tract infection)     Wears glasses        Past Surgical History:  Past Surgical History:   Procedure Laterality Date    HX LUMBAR FUSION  2012    L4-L5    HX ORTHOPAEDIC Left 2014    ligament reconstruction    HX TONSILLECTOMY      HX TUBAL LIGATION  1996    x2       Family History:  Family History   Problem Relation Age of Onset    Heart Disease Mother     Hypertension Mother     Stroke Mother     Hypertension Father     Hypertension Sister     Diabetes Sister        Social History:  Social History     Tobacco Use    Smoking status: Current Every Day Smoker     Packs/day: 0.00     Years: 33.00     Pack years: 0.00     Last attempt to quit: 2013     Years since quittin.6    Smokeless tobacco: Never Used   Substance Use Topics    Alcohol use: No     Alcohol/week: 0.0 standard drinks    Drug use: No       Allergies: Allergies   Allergen Reactions    Penicillins Rash         Review of Systems   Review of Systems   Constitutional: Negative for fever.    Gastrointestinal: Positive for abdominal pain. Negative for nausea and vomiting. Genitourinary: Positive for flank pain. Negative for hematuria. All Other Systems Negative  Physical Exam     Vitals:    07/26/19 1900   BP: 138/86   Pulse: 100   Resp: 14   Temp: 98.6 °F (37 °C)   SpO2: 99%   Weight: 54.4 kg (120 lb)   Height: 5' 3\" (1.6 m)     Physical Exam   Constitutional: She is oriented to person, place, and time. She appears well-developed and well-nourished. HENT:   Head: Normocephalic and atraumatic. Right Ear: External ear normal.   Left Ear: External ear normal.   Nose: Nose normal.   Mouth/Throat: Oropharynx is clear and moist.   Eyes: Pupils are equal, round, and reactive to light. Conjunctivae and EOM are normal.   Neck: Normal range of motion. Neck supple. No JVD present. No tracheal deviation present. Cardiovascular: Normal rate, regular rhythm, normal heart sounds and intact distal pulses. Exam reveals no gallop and no friction rub. No murmur heard. Pulmonary/Chest: Effort normal and breath sounds normal. No respiratory distress. She has no wheezes. She has no rales. Abdominal: Soft. Bowel sounds are normal. She exhibits no distension and no mass. There is tenderness. There is no rebound and no guarding. Left mid lateral abdominal tenderness to palpation, mild. Musculoskeletal: Normal range of motion. She exhibits no edema or tenderness. Neurological: She is alert and oriented to person, place, and time. She has normal reflexes. No cranial nerve deficit. Skin: Skin is warm and dry. No rash noted. Psychiatric: She has a normal mood and affect. Her behavior is normal.   Nursing note and vitals reviewed.          Diagnostic Study Results     Labs -     Recent Results (from the past 12 hour(s))   URINALYSIS W/ RFLX MICROSCOPIC    Collection Time: 07/26/19  7:24 PM   Result Value Ref Range    Color YELLOW      Appearance CLEAR      Specific gravity 1.029 1.005 - 1.030      pH (UA) 5.5 5.0 - 8.0      Protein NEGATIVE  NEG mg/dL    Glucose NEGATIVE  NEG mg/dL    Ketone TRACE (A) NEG mg/dL    Bilirubin NEGATIVE  NEG      Blood SMALL (A) NEG      Urobilinogen 1.0 0.2 - 1.0 EU/dL    Nitrites NEGATIVE  NEG      Leukocyte Esterase NEGATIVE  NEG     URINE MICROSCOPIC ONLY    Collection Time: 07/26/19  7:24 PM   Result Value Ref Range    WBC NONE 0 - 4 /hpf    RBC 1 to 3 0 - 5 /hpf    Epithelial cells 1+ 0 - 5 /lpf    Bacteria FEW (A) NEG /hpf   CBC WITH AUTOMATED DIFF    Collection Time: 07/26/19  7:58 PM   Result Value Ref Range    WBC 6.0 4.6 - 13.2 K/uL    RBC 4.26 4.20 - 5.30 M/uL    HGB 13.4 12.0 - 16.0 g/dL    HCT 39.2 35.0 - 45.0 %    MCV 92.0 74.0 - 97.0 FL    MCH 31.5 24.0 - 34.0 PG    MCHC 34.2 31.0 - 37.0 g/dL    RDW 13.8 11.6 - 14.5 %    PLATELET 283 746 - 250 K/uL    MPV 10.1 9.2 - 11.8 FL    NEUTROPHILS 54 40 - 73 %    LYMPHOCYTES 35 21 - 52 %    MONOCYTES 9 3 - 10 %    EOSINOPHILS 2 0 - 5 %    BASOPHILS 0 0 - 2 %    ABS. NEUTROPHILS 3.3 1.8 - 8.0 K/UL    ABS. LYMPHOCYTES 2.1 0.9 - 3.6 K/UL    ABS. MONOCYTES 0.5 0.05 - 1.2 K/UL    ABS. EOSINOPHILS 0.1 0.0 - 0.4 K/UL    ABS. BASOPHILS 0.0 0.0 - 0.1 K/UL    DF AUTOMATED     METABOLIC PANEL, COMPREHENSIVE    Collection Time: 07/26/19  7:58 PM   Result Value Ref Range    Sodium 145 136 - 145 mmol/L    Potassium 3.3 (L) 3.5 - 5.5 mmol/L    Chloride 113 (H) 100 - 111 mmol/L    CO2 27 21 - 32 mmol/L    Anion gap 5 3.0 - 18 mmol/L    Glucose 77 74 - 99 mg/dL    BUN 21 (H) 7.0 - 18 MG/DL    Creatinine 0.73 0.6 - 1.3 MG/DL    BUN/Creatinine ratio 29 (H) 12 - 20      GFR est AA >60 >60 ml/min/1.73m2    GFR est non-AA >60 >60 ml/min/1.73m2    Calcium 8.9 8.5 - 10.1 MG/DL    Bilirubin, total 0.7 0.2 - 1.0 MG/DL    ALT (SGPT) 28 13 - 56 U/L    AST (SGOT) 22 10 - 38 U/L    Alk.  phosphatase 83 45 - 117 U/L    Protein, total 6.5 6.4 - 8.2 g/dL    Albumin 3.8 3.4 - 5.0 g/dL    Globulin 2.7 2.0 - 4.0 g/dL    A-G Ratio 1.4 0.8 - 1.7     LIPASE    Collection Time: 07/26/19  7:58 PM   Result Value Ref Range    Lipase 80 73 - 393 U/L       Radiologic Studies -   No orders to display     CT Results  (Last 48 hours)    None        CXR Results  (Last 48 hours)    None            Medical Decision Making   I am the first provider for this patient. I reviewed the vital signs, available nursing notes, past medical history, past surgical history, family history and social history. Vital Signs-Reviewed the patient's vital signs. Records Reviewed: Nursing Notes    Procedures:  Procedures    Provider Notes (Medical Decision Making): Check labs and reassess. Give IV fluids. Replace potassium. Patient is ready to go. She does not want to stay for her IV fluids and she says that if she is considering passing a kidney stone or if she has any further deterioration in her condition she will definitely return. Offered a CT scan but patient deferred. She has a very benign exam and reassuring labs otherwise been stable vital signs. BUN/creatinine ratio could be concerning for GI bleed and diverticulitis with just a story of low left lower quadrant abdominal pain however patient has very very minimal reproductive tenderness and her H&H is very stable. DC home. MED RECONCILIATION:  Current Facility-Administered Medications   Medication Dose Route Frequency    potassium chloride (K-DUR, KLOR-CON) SR tablet 40 mEq  40 mEq Oral NOW     Current Outpatient Medications   Medication Sig    dicyclomine (BENTYL) 10 mg capsule Take 1 Cap by mouth four (4) times daily for 5 days.  methocarbamol (ROBAXIN) 500 mg tablet Take 500 mg by mouth.  naproxen (NAPROSYN) 500 mg tablet Take 500 mg by mouth.  methylPREDNISolone (MEDROL, MEERA,) 4 mg tablet Per dose pack instructions    gabapentin (NEURONTIN) 300 mg capsule Take 1 Tab QHS x1 week, then BID x1 week, then TID  Indications: Neuropathic Pain    meloxicam (MOBIC) 15 mg tablet Take 1 Tab by mouth daily (with breakfast).     famotidine (PEPCID) 40 mg tablet Take 1 Tab by mouth daily.  ondansetron (ZOFRAN ODT) 8 mg disintegrating tablet Take 1 Tab by mouth every eight (8) hours as needed for Nausea.  albuterol (PROVENTIL HFA, VENTOLIN HFA) 90 mcg/actuation inhaler Take 2 Puffs by inhalation every six (6) hours as needed.  fluticasone (FLOVENT HFA) 44 mcg/actuation inhaler Take 1 Puff by inhalation two (2) times a day. Disposition:  home    DISCHARGE NOTE:   8:38 PM    Pt has been reexamined. Patient has no new complaints, changes, or physical findings. Care plan outlined and precautions discussed. Results of labs were reviewed with the patient. All medications were reviewed with the patient; will d/c home with bentyl. All of pt's questions and concerns were addressed. Patient was instructed and agrees to follow up with PCP, as well as to return to the ED upon further deterioration. Patient is ready to go home. Follow-up Information     Follow up With Specialties Details Why Contact Info    Wilmer Mcardle, NP  Schedule an appointment as soon as possible for a visit in 3 days As needed [address] [facility] [address]  [phone]      [facility] EMERGENCY DEPT Emergency Medicine  If symptoms worsen return immediately [address]  [phone]          Current Discharge Medication List      START taking these medications    Details   dicyclomine (BENTYL) 10 mg capsule Take 1 Cap by mouth four (4) times daily for 5 days. Qty: 20 Cap, Refills: 0               Diagnosis     Clinical Impression:   1. Hypokalemia    2.  Left lateral abdominal pain

## 2019-07-27 LAB
BACTERIA SPEC CULT: NORMAL
SERVICE CMNT-IMP: NORMAL

## 2019-07-27 NOTE — DISCHARGE INSTRUCTIONS
Patient Education        Hypokalemia: Care Instructions  Your Care Instructions    Hypokalemia (say \"vt-rm-chu-SYLVIA-marion-uh\") is a low level of potassium. The heart, muscles, kidneys, and nervous system all need potassium to work well. This problem has many different causes. Kidney problems, diet, and medicines like diuretics and laxatives can cause it. So can vomiting or diarrhea. In some cases, cancer is the cause. Your doctor may do tests to find the cause of your low potassium levels. You may need medicines to bring your potassium levels back to normal. You may also need regular blood tests to check your potassium. If you have very low potassium, you may need intravenous (IV) medicines. You also may need tests to check the electrical activity of your heart. Heart problems caused by low potassium levels can be very serious. Follow-up care is a key part of your treatment and safety. Be sure to make and go to all appointments, and call your doctor if you are having problems. It's also a good idea to know your test results and keep a list of the medicines you take. How can you care for yourself at home? · If your doctor recommends it, eat foods that have a lot of potassium. These include fresh fruits, juices, and vegetables. They also include nuts, beans, and milk. · Be safe with medicines. If your doctor prescribes medicines or potassium supplements, take them exactly as directed. Call your doctor if you have any problems with your medicines. · Get your potassium levels tested as often as your doctor tells you. When should you call for help? Call 911 anytime you think you may need emergency care. For example, call if:    · You feel like your heart is missing beats.  Heart problems caused by low potassium can cause death.     · You passed out (lost consciousness).     · You have a seizure.    Call your doctor now or seek immediate medical care if:    · You feel weak or unusually tired.     · You have severe arm or leg cramps.     · You have tingling or numbness.     · You feel sick to your stomach, or you vomit.     · You have belly cramps.     · You feel bloated or constipated.     · You have to urinate a lot.     · You feel very thirsty most of the time.     · You are dizzy or lightheaded, or you feel like you may faint.     · You feel depressed, or you lose touch with reality.    Watch closely for changes in your health, and be sure to contact your doctor if:    · You do not get better as expected. Where can you learn more? Go to http://ilsa-bolivar.info/. Enter G358 in the search box to learn more about \"Hypokalemia: Care Instructions. \"  Current as of: November 6, 2018  Content Version: 12.1  © 7067-8660 [employer]. Care instructions adapted under license by [employer] (which disclaims liability or warranty for this information). If you have questions about a medical condition or this instruction, always ask your healthcare professional. [place] [address] any warranty or liability for your use of this information. Patient Education        Abdominal Pain: Care Instructions  Your Care Instructions    Abdominal pain has many possible causes. Some aren't serious and get better on their own in a few days. Others need more testing and treatment. If your pain continues or gets worse, you need to be rechecked and may need more tests to find out what is wrong. You may need surgery to correct the problem. Don't ignore new symptoms, such as fever, nausea and vomiting, urination problems, pain that gets worse, and dizziness. These may be signs of a more serious problem. Your doctor may have recommended a follow-up visit in the next 8 to 12 hours. If you are not getting better, you may need more tests or treatment. The doctor has checked you carefully, but problems can develop later.  If you notice any problems or new symptoms, get medical treatment right away. Follow-up care is a key part of your treatment and safety. Be sure to make and go to all appointments, and call your doctor if you are having problems. It's also a good idea to know your test results and keep a list of the medicines you take. How can you care for yourself at home? · Rest until you feel better. · To prevent dehydration, drink plenty of fluids, enough so that your urine is light yellow or clear like water. Choose water and other caffeine-free clear liquids until you feel better. If you have kidney, heart, or liver disease and have to limit fluids, talk with your doctor before you increase the amount of fluids you drink. · If your stomach is upset, eat mild foods, such as rice, dry toast or crackers, bananas, and applesauce. Try eating several small meals instead of two or three large ones. · Wait until 48 hours after all symptoms have gone away before you have spicy foods, alcohol, and drinks that contain caffeine. · Do not eat foods that are high in fat. · Avoid anti-inflammatory medicines such as aspirin, ibuprofen (Advil, Motrin), and naproxen (Aleve). These can cause stomach upset. Talk to your doctor if you take daily aspirin for another health problem. When should you call for help? Call 911 anytime you think you may need emergency care. For example, call if:    · You passed out (lost consciousness).     · You pass maroon or very bloody stools.     · You vomit blood or what looks like coffee grounds.     · You have new, severe belly pain.    Call your doctor now or seek immediate medical care if:    · Your pain gets worse, especially if it becomes focused in one area of your belly.     · You have a new or higher fever.     · Your stools are black and look like tar, or they have streaks of blood.     · You have unexpected vaginal bleeding.     · You have symptoms of a urinary tract infection. These may include:  ? Pain when you urinate. ?  Urinating more often than usual.  ? Blood in your urine.     · You are dizzy or lightheaded, or you feel like you may faint.    Watch closely for changes in your health, and be sure to contact your doctor if:    · You are not getting better after 1 day (24 hours). Where can you learn more? Go to http://ilsa-bolivar.info/. Enter V591 in the search box to learn more about \"Abdominal Pain: Care Instructions. \"  Current as of: September 23, 2018  Content Version: 12.1  © 9037-4408 [employer]. Care instructions adapted under license by [employer] (which disclaims liability or warranty for this information). If you have questions about a medical condition or this instruction, always ask your healthcare professional. [address] any warranty or liability for your use of this information.

## 2019-07-29 ENCOUNTER — HOSPITAL ENCOUNTER (OUTPATIENT)
Dept: PHYSICAL THERAPY | Age: 54
Discharge: HOME | End: 2019-07-29
Payer: MEDICAID

## 2019-07-29 PROCEDURE — 97110 THERAPEUTIC EXERCISES: CPT

## 2019-07-29 NOTE — PROGRESS NOTES
PT DAILY TREATMENT NOTE 10-18    Patient Name: Ronald Quintanilla  Date:2019  : 1965  [x]  Patient  Verified  Payor: Haroldo Clement / Plan: [facility] [employer] CARE / Product Type: Managed Care Medicaid /    In time:1:22  Out time:1:38  Total Treatment Time (min): 16  Visit #: 2 of 10    Medicare/BCBS Only   Total Timed Codes (min):   1:1 Treatment Time:         Treatment Area: Low back pain [M54.5]    SUBJECTIVE  Pain Level (0-10 scale): 3  Any medication changes, allergies to medications, adverse drug reactions, diagnosis change, or new procedure performed?: [x] No    [] Yes (see summary sheet for update)  Subjective functional status/changes:   [] No changes reported  \" Little  Pain. \"    OBJECTIVE    Modality rationale: decrease edema, decrease inflammation, decrease pain and increase tissue extensibility to improve the patients ability to perform ADL   Min Type Additional Details    [] Estim:  []Unatt       []IFC  []Premod                        []Other:  []w/ice   []w/heat  Position:  Location:    [] Estim: []Att    []TENS instruct  []NMES                    []Other:  []w/US   []w/ice   []w/heat  Position:  Location:    []  Traction: [] Cervical       []Lumbar                       [] Prone          []Supine                       []Intermittent   []Continuous Lbs:  [] before manual  [] after manual    []  Ultrasound: []Continuous   [] Pulsed                           []1MHz   []3MHz W/cm2:  Location:    []  Iontophoresis with dexamethasone         Location: [] Take home patch   [] In clinic    []  Ice     []  heat  []  Ice massage  []  Laser   []  Anodyne Position:  Location:    []  Laser with stim  []  Other:  Position:  Location:    []  Vasopneumatic Device Pressure:       [] lo [] med [] hi   Temperature: [] lo [] med [] hi   [x] Skin assessment post-treatment:  [x]intact []redness- no adverse reaction    []redness - adverse reaction:      min []Eval                  []Re-Eval       16 min Therapeutic Exercise:  [x] See flow sheet :   Rationale: increase ROM and increase strength to improve the patients ability to perform ADL      min Therapeutic Activity:  _  See flow sheet :   Rationale:   to improve the patients ability to       min Neuromuscular Re-education:  []  See flow sheet :   Rationale:   to improve the patients ability to     min Manual Therapy:    Rationale: decrease pain, increase ROM, increase tissue extensibility and decrease edema  to perform ADL      min Gait Training:  ___ feet with ___ device on level surfaces with ___ level of assist   Rationale: With   [x] TE   [] TA   [] neuro   [] other: Patient Education: [x] Review HEP    [] Progressed/Changed HEP based on:   [] positioning   [] body mechanics   [] transfers   [] heat/ice application    [] other:      Other Objective/Functional Measures:      Pain Level (0-10 scale) post treatment: 0    ASSESSMENT/Changes in Function: Completed   Each there ex  Fairly  Well  With cues. Patient will continue to benefit from skilled PT services to address functional mobility deficits, address ROM deficits, address strength deficits, analyze and address soft tissue restrictions, analyze and cue movement patterns and instruct in home and community integration to attain remaining goals. [x]  See Plan of Care  []  See progress note/recertification  []  See Discharge Summary         Progress towards goals / Updated goals:  Short Term Goals: To be accomplished in 2 weeks:  1. Patient will decrease pain during aggravating activities (sitting) by 2 points on Verbal Analog Scale (Eval: 10/10) to increase participation on Activities of Daily Living such as bending and stooping.    2. Patient will demonstrate increased strength by ½ grade on MMT (Eval: 3+) throughout core and lumbar spine to improve functional participation in such tasks as prolonged standing and sitting.         3.   Patient will improve sitting tolerance time by 10 min (Eval: 1-2 min) without increased pain.    Long Term Goals: To be accomplished in 3 weeks:  1. Patient will have consistently decreased pain during aggravating activities (sitting) by 4 points on Verbal Analog Scale (Eval: 10/10) to increase participation on Activities of Daily Living such as squatting, lifting and carrying moderate to heavy items. 2. Patient will demonstrate increased strength by 1 grade on MMT (Eval: 3+/5) to improve functional participation in such tasks as standing and sitting for greater than 30 min.    3. Patient will achieve predicted FOTO scores (58, eval 38)to demonstrate decreased functional impairment to facilitate improved participation in activities of daily living, improve overall quality of life.      PLAN  []  Upgrade activities as tolerated     [x]  Continue plan of care  []  Update interventions per flow sheet       []  Discharge due to:_  []  Other:_      Sophia Calderon PTA 7/29/2019  1:25 PM    Future Appointments   Date Time Provider Chong Chavez   7/31/2019 12:30 PM Nabil Monona [facility]   8/2/2019  2:00 PM Shannon De La Fuente [facility]   8/5/2019  1:30 PM Nabil Monona [facility]   8/7/2019 12:30 PM Nabil Monona [facility]   8/9/2019  2:30 PM Timo Howard PTA [facility]   8/12/2019  1:30 PM Timo Howard PTA [facility]   8/14/2019  2:00 PM Timo Howard PTA [facility]   8/15/2019  2:30 PM Donta Quan PT [facility]   9/9/2019 10:10 AM Meredith Barr, KATHARINE [address]

## 2019-07-31 ENCOUNTER — HOSPITAL ENCOUNTER (OUTPATIENT)
Dept: PHYSICAL THERAPY | Age: 54
Discharge: HOME | End: 2019-07-31
Payer: MEDICAID

## 2019-07-31 PROCEDURE — 97110 THERAPEUTIC EXERCISES: CPT

## 2019-07-31 NOTE — PROGRESS NOTES
PT DAILY TREATMENT NOTE 10-18    Patient Name: Katia Muhammad  Date:2019  : 1965  [x]  Patient  Verified  Payor: Gerry Burnham / Plan: [facility] [employer] [facility] / Product Type: Managed Care Medicaid /    In time:12:40  Out time:1:20  Total Treatment Time (min): 30  Visit #: 3 of 10    Treatment Area: Low back pain [M54.5]    SUBJECTIVE  Pain Level (0-10 scale): 0/10  Any medication changes, allergies to medications, adverse drug reactions, diagnosis change, or new procedure performed?: [x] No    [] Yes (see summary sheet for update)  Subjective functional status/changes:   [] No changes reported  I'm feeling good. I get some LB pain when I've been sitting down for a long period of time. OBJECTIVE    30 min Therapeutic Exercise:  [x] See flow sheet :   Rationale: increase ROM, increase strength, improve coordination and improve balance to improve the patients ability to return to normal activities. With   [] TE   [] TA   [] neuro   [] other: Patient Education: [x] Review HEP    [] Progressed/Changed HEP based on:   [] positioning   [] body mechanics   [] transfers   [] heat/ice application    [x] other: lumbar roll for seated LB support during prolonged seated sessions. Added SKTC,DKTC, hip 3 way in standing with 3 # weights. Other Objective/Functional Measures:  Improvement noted with patient's ability to increase rate and time on TM to 10 minutes at 3.2 miles per hour. Pain Level (0-10 scale) post treatment:  0/10    ASSESSMENT/Changes in Function: Patient reports overall improvement of 70% at this time. She indicates that she will finish out course of therapy before she returns to the MD for release back to full duty at work. Patient will continue to benefit from skilled PT services to address ROM deficits, address strength deficits and analyze and address soft tissue restrictions to attain remaining goals.      [x]  See Plan of Care  []  See progress note/recertification  []  See Discharge Summary         Progress towards goals / Updated goals:  Short Term Goals: To be accomplished in 2 weeks:  1. Patient will decrease pain during aggravating activities (sitting) by 2 points on Verbal Analog Scale (Eval: 10/10) to increase participation on Activities of Daily Living such as bending and stooping. 2. Patient will demonstrate increased strength by ½ grade on MMT (Eval: 3+) throughout core and lumbar spine to improve functional participation in such tasks as prolonged standing and sitting.         3.   Patient will improve sitting tolerance time by 10 min (Eval: 1-2 min) without increased pain.    Long Term Goals: To be accomplished in 3 weeks:  1. Patient will have consistently decreased pain during aggravating activities (sitting) by 4 points on Verbal Analog Scale (Eval: 10/10) to increase participation on Activities of Daily Living such as squatting, lifting and carrying moderate to heavy items. 2. Patient will demonstrate increased strength by 1 grade on MMT (Eval: 3+/5) to improve functional participation in such tasks as standing and sitting for greater than 30 min.    3.  Patient will achieve predicted FOTO scores (58, eval 38)to demonstrate decreased functional impairment to facilitate improved participation in activities of daily living, improve overall quality of life.        PLAN  [x]  Upgrade activities as tolerated     [x]  Continue plan of care  []  Update interventions per flow sheet       []  Discharge due to:_  []  Other:_      Serina Garcia 7/31/2019  12:43 PM    Future Appointments   Date Time Provider Chong Chavez   8/2/2019  2:00 PM Surekha Hylton [facility]   8/5/2019  1:30 PM Elio Reza [facility]   8/7/2019 12:30 PM Elio Reza [facility]   8/9/2019  2:30 PM Jerome Lainez PTA [facility]   8/12/2019  1:30 PM Jerome Lainez PTA [facility]   8/14/2019  2:00 PM Sophia Calderon PTA [facility]   8/15/2019  2:30 PM Adrian Enciso, PT [facility] [address]   9/9/2019 10:10 AM Cydney Esposito, KATHARINE [address]

## 2019-08-01 ENCOUNTER — DOCUMENTATION ONLY (OUTPATIENT)
Dept: ORTHOPEDIC SURGERY | Age: 54
End: 2019-08-01

## 2019-08-01 NOTE — PROGRESS NOTES
Patient called to state she went back to work on light duty while doing PT on 07-25-19 and will return to work full duty on 08-15-19. She states PT is helping and they state she is doing very well.  She needs a note to return full duty on the 15th of Aug. OK per Radha Cerda NP VO.

## 2019-08-02 ENCOUNTER — HOSPITAL ENCOUNTER (OUTPATIENT)
Dept: PHYSICAL THERAPY | Age: 54
Discharge: HOME | End: 2019-08-02
Payer: MEDICAID

## 2019-08-02 PROCEDURE — 97112 NEUROMUSCULAR REEDUCATION: CPT

## 2019-08-02 PROCEDURE — 97110 THERAPEUTIC EXERCISES: CPT

## 2019-08-02 NOTE — PROGRESS NOTES
PT DAILY TREATMENT NOTE 10-18    Patient Name: Valentino Jackson  Date:2019  : 1965  [x]  Patient  Verified  Payor: Mickie Weir / Plan: [facility] [employer] [employer] / Product Type: Managed Care Medicaid /    In time:1:30  Out time:2:01  Total Treatment Time (min): 31  Visit #: 4 of 10      Treatment Area: Low back pain [M54.5]    SUBJECTIVE  Pain Level (0-10 scale): 0  Any medication changes, allergies to medications, adverse drug reactions, diagnosis change, or new procedure performed?: [x] No    [] Yes (see summary sheet for update)  Subjective functional status/changes:   [] No changes reported  Pt reports feeling good today    OBJECTIVE    23 min Therapeutic Exercise:  [x] See flow sheet :   Rationale: increase ROM and increase strength to improve the patients ability to perform ADLs    8 min Neuromuscular Re-education:  [x]  See flow sheet :   Rationale: increase strength and increase proprioception  to improve the patients ability to perform functional tasks            With   [] TE   [] TA   [] neuro   [] other: Patient Education: [x] Review HEP    [] Progressed/Changed HEP based on:   [] positioning   [] body mechanics   [] transfers   [] heat/ice application    [] other:      Other Objective/Functional Measures:   vc's to focus on ecc control with exercises  Sitting tolerance 10 min     Pain Level (0-10 scale) post treatment: 0    ASSESSMENT/Changes in Function: Pt was challenged well with therex today, notes fatigue at end of treatment but no c/o incr'd pain. Pt reports no difficulty sitting 10 min. Vc's to slow exercise pace and focus on ecc control. Challenged greatly with pelvic tilt technique.      Patient will continue to benefit from skilled PT services to modify and progress therapeutic interventions, address functional mobility deficits, address ROM deficits, address strength deficits, analyze and address soft tissue restrictions, analyze and cue movement patterns, analyze and modify body mechanics/ergonomics and assess and modify postural abnormalities to attain remaining goals. []  See Plan of Care  []  See progress note/recertification  []  See Discharge Summary         Progress towards goals / Updated goals:  Short Term Goals: To be accomplished in 2 weeks:  1. Patient will decrease pain during aggravating activities (sitting) by 2 points on Verbal Analog Scale (Eval: 10/10) to increase participation on Activities of Daily Living such as bending and stooping. 2. Patient will demonstrate increased strength by ½ grade on MMT (Eval: 3+) throughout core and lumbar spine to improve functional participation in such tasks as prolonged standing and sitting.         3.   Patient will improve sitting tolerance time by 10 min (Eval: 1-2 min) without increased pain.    Met - pt reports sitting tolerance 10 min (8/2/19)  Long Term Goals: To be accomplished in 3 weeks:  1. Patient will have consistently decreased pain during aggravating activities (sitting) by 4 points on Verbal Analog Scale (Eval: 10/10) to increase participation on Activities of Daily Living such as squatting, lifting and carrying moderate to heavy items. 2. Patient will demonstrate increased strength by 1 grade on MMT (Eval: 3+/5) to improve functional participation in such tasks as standing and sitting for greater than 30 min.    3.  Patient will achieve predicted FOTO scores (58, eval 38)to demonstrate decreased functional impairment to facilitate improved participation in activities of daily living, improve overall quality of life.     PLAN  []  Upgrade activities as tolerated     [x]  Continue plan of care  []  Update interventions per flow sheet       []  Discharge due to:_  []  Other:_      Celeste Redmond PTA 8/2/2019  1:33 PM    Future Appointments   Date Time Provider Chong Chavez   8/7/2019 12:30 PM Dunbar Apbill [facility]   8/9/2019  2:30 PM Sophia Calderon PTA [facility]   8/12/2019  1:30 PM Kvng Sophia, PTA [facility]   8/14/2019  2:00 PM Jan Lind, JACEY [facility]   8/15/2019  2:30 PM Jennifer Rabago, PT [facility]   9/9/2019 10:10 AM Gypsy Da Silva  [address]

## 2019-08-05 ENCOUNTER — APPOINTMENT (OUTPATIENT)
Dept: PHYSICAL THERAPY | Age: 54
End: 2019-08-05
Payer: MEDICAID

## 2019-08-07 ENCOUNTER — HOSPITAL ENCOUNTER (OUTPATIENT)
Dept: PHYSICAL THERAPY | Age: 54
Discharge: HOME | End: 2019-08-07
Payer: MEDICAID

## 2019-08-07 ENCOUNTER — TELEPHONE (OUTPATIENT)
Dept: ORTHOPEDIC SURGERY | Age: 54
End: 2019-08-07

## 2019-08-07 PROCEDURE — 97110 THERAPEUTIC EXERCISES: CPT
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
## 2019-08-07 NOTE — PROGRESS NOTES
PT DAILY TREATMENT NOTE 10-18    Patient Name: Satish Fink  Date:2019  : 1965  [x]  Patient  Verified  Payor: [employer] / Plan: [facility] [employer] [facility] / Product Type: Managed Care Medicaid /    In time:12:30  Out time:1:00  Total Treatment Time (min): 30  Visit #: 5 of 10      Treatment Area: Low back pain [M54.5]    SUBJECTIVE  Pain Level (0-10 scale): 0/10  Any medication changes, allergies to medications, adverse drug reactions, diagnosis change, or new procedure performed?: [x] No    [] Yes (see summary sheet for update)  Subjective functional status/changes:   [] No changes reported  I'm back to normal. I have not more pain now. OBJECTIVE      30 min Therapeutic Exercise:  [x] See flow sheet :   Rationale: increase ROM, improve coordination and improve balance to improve the patients ability to return to normal activities. With   [] TE   [] TA   [] neuro   [] other: Patient Education: [x] Review HEP    [] Progressed/Changed HEP based on:   [] positioning   [] body mechanics   [] transfers   [] heat/ice application    [x] other: MD notified that patient has met her goals and she does not want to continue with skilled care as she reports 99% improvement. Other Objective/Functional Measures: Making gains toward LTgs and may be [address] after MD oks her to go back to work full time without limits. Pain Level (0-10 scale) post treatment: 0/10    ASSESSMENT/Changes in Function: MD notified    Patient will continue to benefit from skilled PT services to address functional mobility deficits, address ROM deficits and address strength deficits to attain remaining goals. [x]  See Plan of Care  []  See progress note/recertification  []  See Discharge Summary         Progress towards goals / Updated goals:    Short Term Goals: To be accomplished in 2 weeks:  1.  Patient will decrease pain during aggravating activities (sitting) by 2 points on Verbal Analog Scale (Eval: 10/10) to increase participation on Activities of Daily Living such as bending and stooping. Current 8/7/19 goal met     2. Patient will demonstrate increased strength by ½ grade on MMT (Eval: 3+) throughout core and lumbar spine to improve functional participation in such tasks as prolonged standing and sitting. Current 8/7/19 goal met        3.   Patient will improve sitting tolerance time by 10 min (Eval: 1-2 min) without increased pain.    Met - pt reports sitting tolerance 10 min (8/2/19)  Long Term Goals: To be accomplished in 3 weeks:  1. Patient will have consistently decreased pain during aggravating activities (sitting) by 4 points on Verbal Analog Scale (Eval: 10/10) to increase participation on Activities of Daily Living such as squatting, lifting and carrying moderate to heavy items. Goal met 8/7/19  2. Patient will demonstrate increased strength by 1 grade on MMT (Eval: 3+/5) to improve functional participation in such tasks as standing and sitting for greater than 30 min.    3.  Patient will achieve predicted FOTO scores (58, eval 38)to demonstrate decreased functional impairment to facilitate improved participation in activities of daily living, improve overall quality of life.      PLAN  [x]  Upgrade activities as tolerated     []  Continue plan of care  [x]  Update interventions per flow sheet       []  Discharge due to:_   []  Other:_      Deyanira Montes De Oca 8/7/2019  11:58 AM    Future Appointments   Date Time Provider Chong Chavez   8/7/2019 12:30 PM Lucia Sánchez [facility]   8/9/2019  2:30 PM Kvng, [employer], PTA [facility]   8/12/2019  1:30 PM Bumindy Salillyr, PTA [facility]   8/14/2019  2:00 PM Buelah Saucer, PTA [facility]   8/15/2019  2:30 PM Amaya Cardoso, PT [facility]   9/9/2019 10:10 AM David Pederson  [address]

## 2019-08-08 NOTE — TELEPHONE ENCOUNTER
Spoke with eBrnardo العلي who basically stated patient would like to be d/c, she mostly report no pain at times, she was given HEP and she believes the patient is back to work full duty. She also stated the patient verbalized difficulty with getting there for her appt. Bernardo العلي was told that NP would recommend completing PT but if the patient would like to be D/c'd and theres no pain to treat she should be D/C'd.

## 2019-08-09 ENCOUNTER — APPOINTMENT (OUTPATIENT)
Dept: PHYSICAL THERAPY | Age: 54
End: 2019-08-09
Payer: MEDICAID

## 2019-08-12 ENCOUNTER — APPOINTMENT (OUTPATIENT)
Dept: PHYSICAL THERAPY | Age: 54
End: 2019-08-12
Payer: MEDICAID

## 2019-08-14 ENCOUNTER — APPOINTMENT (OUTPATIENT)
Dept: PHYSICAL THERAPY | Age: 54
End: 2019-08-14
Payer: MEDICAID

## 2019-08-15 ENCOUNTER — APPOINTMENT (OUTPATIENT)
Dept: PHYSICAL THERAPY | Age: 54
End: 2019-08-15
Payer: MEDICAID

## 2019-09-06 NOTE — PROGRESS NOTES
[facility] [address], [address] [place] [address], [address] [phone] fax    Discharge Summary    Patient name: Moises Levy Start of Care: 2019   Referral source: Vonzella Schlatter, MD : 1965                Medical Diagnosis: Low back pain [M54.5]  Payor: Mary Beth Robison / Plan: [address] [place] [id number] / Product Type: Managed Care Medicaid /  Onset Date:19                Treatment Diagnosis: Low back pain   Prior Hospitalization: see medical history Provider#: [id number]   Medications: Verified on Patient summary List    Comorbidities: patient reports: previous surgery, asthma   Prior Level of Function: Was able to work full time, sit for prolonged periods of time as desired, and do her daily activities of cooking, cleaning, laundry, etc, as needed without pain or lasting discomfort.                Medications: Verified on Patient Summary List    Visits from Start of Care: 5    Missed Visits: 5  Reporting Period : 19 to 19    Summary of Care:  Short Term Goals: To be accomplished in 2 weeks:  1. Patient will decrease pain during aggravating activities (sitting) by 2 points on Verbal Analog Scale (Eval: 10/10) to increase participation on Activities of Daily Living such as bending and stooping. Current 19 goal met   2.  Patient will demonstrate increased strength by ½ grade on MMT (Eval: 3+) throughout core and lumbar spine to improve functional participation in such tasks as prolonged standing and sitting. Current 19 goal met  3. Patient will improve sitting tolerance time by 10 min (Eval: 1-2 min) without increased pain. Met - pt reports sitting tolerance 10 min (19)    Long Term Goals: To be accomplished in 3 weeks:  1.  Patient will have consistently decreased pain during aggravating activities (sitting) by 4 points on Verbal Analog Scale (Eval: 10/10) to increase participation on Activities of Daily Living such as squatting, lifting and carrying moderate to heavy items. Goal met 8/7/19  2. Patient will demonstrate increased strength by 1 grade on MMT (Eval: 3+/5) to improve functional participation in such tasks as standing and sitting for greater than 30 min.  met  3.  Patient will achieve predicted FOTO scores (58, eval 38)to demonstrate decreased functional impairment to facilitate improved participation in activities of daily living, improve overall quality of life. Not captured    ASSESSMENT/RECOMMENDATIONS:  [x]Discontinue therapy progressing towards or have reached established goals  []Discontinue therapy due to lack of appreciable progress towards goals  []Discontinue therapy due to lack of attendance or compliance  [x]Other: Per Physician    Thank you for this referral.     Carlton Torres, PT 9/6/2019 5:31 PM

## 2019-09-09 ENCOUNTER — OFFICE VISIT (OUTPATIENT)
Dept: ORTHOPEDIC SURGERY | Age: 54
End: 2019-09-09

## 2019-09-09 VITALS
BODY MASS INDEX: 23.81 KG/M2 | HEART RATE: 91 BPM | WEIGHT: 134.4 LBS | TEMPERATURE: 97.9 F | HEIGHT: 63 IN | SYSTOLIC BLOOD PRESSURE: 155 MMHG | RESPIRATION RATE: 16 BRPM | DIASTOLIC BLOOD PRESSURE: 89 MMHG

## 2019-09-09 DIAGNOSIS — M25.511 ACUTE PAIN OF RIGHT SHOULDER: Primary | ICD-10-CM

## 2019-09-09 RX ORDER — LIDOCAINE HYDROCHLORIDE 20 MG/ML
1 INJECTION, SOLUTION EPIDURAL; INFILTRATION; INTRACAUDAL; PERINEURAL ONCE
Qty: 1 ML | Refills: 0
Start: 2019-09-09 | End: 2019-09-09

## 2019-09-09 RX ORDER — METHOCARBAMOL 500 MG/1
500-1000 TABLET, FILM COATED ORAL
Qty: 45 TAB | Refills: 0 | Status: SHIPPED | OUTPATIENT
Start: 2019-09-09 | End: 2021-03-10

## 2019-09-09 RX ORDER — BETAMETHASONE SODIUM PHOSPHATE AND BETAMETHASONE ACETATE 3; 3 MG/ML; MG/ML
6 INJECTION, SUSPENSION INTRA-ARTICULAR; INTRALESIONAL; INTRAMUSCULAR; SOFT TISSUE ONCE
Qty: 1 ML | Refills: 0
Start: 2019-09-09 | End: 2019-09-09

## 2019-09-09 RX ORDER — BUPIVACAINE HYDROCHLORIDE 2.5 MG/ML
1 INJECTION, SOLUTION INFILTRATION; PERINEURAL ONCE
Qty: 1 ML | Refills: 0
Start: 2019-09-09 | End: 2019-09-09

## 2019-09-09 NOTE — PATIENT INSTRUCTIONS
Learning About Trigger Point Injections  What are trigger point injections? A trigger point is a painful knot in a tight band of muscle. A trigger point often causes pain to be felt in other areas, too. For example, a trigger point in the neck or upper back can cause pain in the head. Trigger point injections are shots of medicine into these knots to help relieve the pain. The medicines are usually local anesthetics like lidocaine. Trigger point injections are often part of plan that includes other treatments, such as muscle stretching and strengthening. How is a trigger point injection done? Your doctor first locates a trigger point by pressing around the painful area. This may cause your muscle to hurt or twitch. This tells the doctor that he or she has found the spot to do the injection. The area is cleaned. Your doctor then injects the medicine into the trigger point. He or she may inject the medicine in more than one direction within the trigger point. The doctor may change direction without removing the needle. If you have more than one trigger point in the muscle, your doctor may repeat the process. Your doctor may stretch the area to help the muscle relax. He or she may also show you how to move and stretch the muscle yourself. The procedure takes about 10 to 30 minutes. How long it takes depends on how many trigger points are treated. But an injection itself takes only a few moments. What can you expect after a trigger point injection? The area may feel a bit numb for a few hours. It may also feel sore. Other problems from trigger point injections are rare. There is a chance of skin infection at the injection site. And if injections are done in the chest area, there is a small risk of puncturing the outer lining of the lung (pneumothorax). Trigger point injections may reduce some or all of your pain. But the pain can come back after the medicine wears off.  If your pain comes back, your doctor may suggest more shots or other treatment for longer-lasting relief. Follow your doctor's instructions carefully. And tell your doctor about any new or unusual symptoms, such as chest pain or shortness of breath. Follow-up care is a key part of your treatment and safety. Be sure to make and go to all appointments, and call your doctor if you are having problems. It's also a good idea to know your test results and keep a list of the medicines you take. Where can you learn more? Go to http://ilsa-bolivar.info/. Enter [id number] [id number] in the search box to learn more about \"Learning About Trigger Point Injections. \"  Current as of: September 20, 2018  Content Version: 12.1  © 3136-0758 [employer]. Care instructions adapted under license by [employer] (which disclaims liability or warranty for this information). If you have questions about a medical condition or this instruction, always ask your healthcare professional. [place] [address] any warranty or liability for your use of this information. Rotator Cuff: Exercises  Introduction  Here are some examples of exercises for you to try. The exercises may be suggested for a condition or for rehabilitation. Start each exercise slowly. Ease off the exercises if you start to have pain. You will be told when to start these exercises and which ones will work best for you. How to do the exercises  Pendulum swing    1. Hold on to a table or the back of a chair with your good arm. Then bend forward a little and let your sore arm hang straight down. This exercise does not use the arm muscles. Rather, use your legs and your hips to create movement that makes your arm swing freely. 2. Use the movement from your hips and legs to guide the slightly swinging arm back and forth like a pendulum (or elephant trunk). Then guide it in circles that start small (about the size of a dinner plate).  Make the circles a bit larger each day, as your pain allows. 3. Do this exercise for 5 minutes, 5 to 7 times each day. 4. As you have less pain, try bending over a little farther to do this exercise. This will increase the amount of movement at your shoulder. Posterior stretching exercise    1. Hold the elbow of your injured arm with your other hand. 2. Use your hand to pull your injured arm gently up and across your body. You will feel a gentle stretch across the back of your injured shoulder. 3. Hold for at least 15 to 30 seconds. Then slowly lower your arm. 4. Repeat 2 to 4 times. Up-the-back stretch    1. Put your hand in your back pocket. Let it rest there to stretch your shoulder. 2. With your other hand, hold your injured arm (palm outward) behind your back by the wrist. Pull your arm up gently to stretch your shoulder. 3. Next, put a towel over your other shoulder. Put the hand of your injured arm behind your back. Now hold the back end of the towel. With the other hand, hold the front end of the towel in front of your body. Pull gently on the front end of the towel. This will bring your hand farther up your back to stretch your shoulder. Overhead stretch    1. Standing about an arm's length away, grasp onto a solid surface. You could use a countertop, a doorknob, or the back of a sturdy chair. 2. With your knees slightly bent, bend forward with your arms straight. Lower your upper body, and let your shoulders stretch. 3. As your shoulders are able to stretch farther, you may need to take a step or two backward. 4. Hold for at least 15 to 30 seconds. Then stand up and relax. If you had stepped back during your stretch, step forward so you can keep your hands on the solid surface. 5. Repeat 2 to 4 times. Shoulder flexion (lying down)    1. Lie on your back, holding a wand with both hands. Your palms should face down as you hold the wand. 2. Keeping your elbows straight, slowly raise your arms over your head. Raise them until you feel a stretch in your shoulders, upper back, and chest.  3. Hold for 15 to 30 seconds. 4. Repeat 2 to 4 times. Shoulder rotation (lying down)    1. Lie on your back. Hold a wand with both hands with your elbows bent and palms up. 2. Keep your elbows close to your body, and move the wand across your body toward the sore arm. 3. Hold for 8 to 12 seconds. 4. Repeat 2 to 4 times. Wall climbing (to the side)    1. Stand with your side to a wall so that your fingers can just touch it at an angle about 30 degrees toward the front of your body. 2. Walk the fingers of your injured arm up the wall as high as pain permits. Try not to shrug your shoulder up toward your ear as you move your arm up. 3. Hold that position for a count of at least 15 to 20.  4. Walk your fingers back down to the starting position. 5. Repeat at least 2 to 4 times. Try to reach higher each time. Wall climbing (to the front)    1. Face a wall, and stand so your fingers can just touch it. 2. Keeping your shoulder down, walk the fingers of your injured arm up the wall as high as pain permits. (Don't shrug your shoulder up toward your ear.)  3. Hold your arm in that position for at least 15 to 30 seconds. 4. Slowly walk your fingers back down to where you started. 5. Repeat at least 2 to 4 times. Try to reach higher each time. Shoulder blade squeeze    1. Stand with your arms at your sides, and squeeze your shoulder blades together. Do not raise your shoulders up as you squeeze. 2. Hold 6 seconds. 3. Repeat 8 to 12 times. Scapular exercise: Arm reach    1. Lie flat on your back. This exercise is a very slight motion that starts with your arms raised (elbows straight, arms straight). 2. From this position, reach higher toward the jerrell or ceiling. Keep your elbows straight. All motion should be from your shoulder blade only. 3. Relax your arms back to where you started. 4. Repeat 8 to 12 times.     Arm raise to the side    1. Slowly raise your injured arm to the side, with your thumb facing up. Raise your arm 60 degrees at the most (shoulder level is 90 degrees). 2. Hold the position for 3 to 5 seconds. Then lower your arm back to your side. If you need to, bring your \"good\" arm across your body and place it under the elbow as you lower your injured arm. Use your good arm to keep your injured arm from dropping down too fast.  3. Repeat 8 to 12 times. 4. When you first start out, don't hold any extra weight in your hand. As you get stronger, you may use a 1-pound to 2-pound dumbbell or a small can of food. Shoulder flexor and extensor exercise    1. Push forward (flex): Stand facing a wall or doorjamb, about 6 inches or less back. Hold your injured arm against your body. Make a closed fist with your thumb on top. Then gently push your hand forward into the wall with about 25% to 50% of your strength. Don't let your body move backward as you push. Hold for about 6 seconds. Relax for a few seconds. Repeat 8 to 12 times. 2. Push backward (extend): Stand with your back flat against a wall. Your upper arm should be against the wall, with your elbow bent 90 degrees (your hand straight ahead). Push your elbow gently back against the wall with about 25% to 50% of your strength. Don't let your body move forward as you push. Hold for about 6 seconds. Relax for a few seconds. Repeat 8 to 12 times. Scapular exercise: Wall push-ups    1. Stand facing a wall, about 12 inches to 18 inches away. 2. Place your hands on the wall at shoulder height. 3. Slowly bend your elbows and bring your face to the wall. Keep your back and hips straight. 4. Push back to where you started. 5. Repeat 8 to 12 times. 6. When you can do this exercise against a wall comfortably, you can try it against a counter. You can then slowly progress to the end of a couch, then to a sturdy chair, and finally to the floor.     Scapular exercise: Retraction    1. Put the band around a solid object at about waist level. (A bedpost will work well.) Each hand should hold an end of the band. 2. With your elbows at your sides and bent to 90 degrees, pull the band back. Your shoulder blades should move toward each other. Then move your arms back where you started. 3. Repeat 8 to 12 times. 4. If you have good range of motion in your shoulders, try this exercise with your arms lifted out to the sides. Keep your elbows at a 90-degree angle. Raise the elastic band up to about shoulder level. Pull the band back to move your shoulder blades toward each other. Then move your arms back where you started. Internal rotator strengthening exercise    1. Start by tying a piece of elastic exercise material to a doorknob. You can use surgical tubing or Thera-Band. 2. Stand or sit with your shoulder relaxed and your elbow bent 90 degrees. Your upper arm should rest comfortably against your side. Squeeze a rolled towel between your elbow and your body for comfort. This will help keep your arm at your side. 3. Hold one end of the elastic band in the hand of the painful arm. 4. Slowly rotate your forearm toward your body until it touches your belly. Slowly move it back to where you started. 5. Keep your elbow and upper arm firmly tucked against the towel roll or at your side. 6. Repeat 8 to 12 times. External rotator strengthening exercise    1. Start by tying a piece of elastic exercise material to a doorknob. You can use surgical tubing or Thera-Band. (You may also hold one end of the band in each hand.)  2. Stand or sit with your shoulder relaxed and your elbow bent 90 degrees. Your upper arm should rest comfortably against your side. Squeeze a rolled towel between your elbow and your body for comfort. This will help keep your arm at your side. 3. Hold one end of the elastic band with the hand of the painful arm. 4. Start with your forearm across your belly. Slowly rotate the forearm out away from your body. Keep your elbow and upper arm tucked against the towel roll or the side of your body until you begin to feel tightness in your shoulder. Slowly move your arm back to where you started. 5. Repeat 8 to 12 times. Follow-up care is a key part of your treatment and safety. Be sure to make and go to all appointments, and call your doctor if you are having problems. It's also a good idea to know your test results and keep a list of the medicines you take. Where can you learn more? Go to http://ilsa-bolivar.info/. Enter Jalil Mata in the search box to learn more about \"Rotator Cuff: Exercises. \"  Current as of: September 20, 2018  Content Version: 12.1  © 7225-2191 [employer]. Care instructions adapted under license by [employer] (which disclaims liability or warranty for this information). If you have questions about a medical condition or this instruction, always ask your healthcare professional. [address] any warranty or liability for your use of this information.

## 2019-09-09 NOTE — LETTER
NOTIFICATION RETURN TO WORK / SCHOOL 
 
9/9/2019 10:28 AM 
 
Ms. Aaron Torre [place] [id number] To Whom It May Concern: 
 
Aaron Torre is [facility] [address] [facility]. She will return to work/school on: 9/10/19 w/ a 20lb weight lifting limit until her appointment with a shoulder doctor up to 4 wks from now. If there are questions or concerns please have the patient contact our office. Sincerely, José Miguel Hsu NP
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
## 2019-09-09 NOTE — PROGRESS NOTES
Riley Tolentino presents today for   Chief Complaint   Patient presents with    Back Pain     FU    Shoulder Pain       Is someone accompanying this pt? NO    Is the patient using any DME equipment during OV? NO    Depression Screening:  3 most recent PHQ Screens 9/9/2019   Little interest or pleasure in doing things Not at all   Feeling down, depressed, irritable, or hopeless Not at all   Total Score PHQ 2 0       Learning Assessment:  Learning Assessment 9/9/2019   PRIMARY LEARNER Patient   PRIMARY LANGUAGE ENGLISH   LEARNER PREFERENCE PRIMARY LISTENING   ANSWERED BY patient   RELATIONSHIP SELF         Coordination of Care:  1. Have you been to the ER, urgent care clinic since your last visit? NO  Hospitalized since your last visit? NO    2. Have you seen or consulted any other health care providers outside of the [address] [place] [address] since your last visit? NO Include any pap smears or colon screening.  NO    Last  Checked 9/9/19

## 2019-09-18 ENCOUNTER — OFFICE VISIT (OUTPATIENT)
Dept: ORTHOPEDIC SURGERY | Age: 54
End: 2019-09-18

## 2019-09-18 VITALS
BODY MASS INDEX: 24.34 KG/M2 | HEART RATE: 100 BPM | SYSTOLIC BLOOD PRESSURE: 126 MMHG | HEIGHT: 63 IN | OXYGEN SATURATION: 98 % | DIASTOLIC BLOOD PRESSURE: 83 MMHG | WEIGHT: 137.4 LBS | TEMPERATURE: 97.8 F

## 2019-09-18 DIAGNOSIS — M25.511 ACUTE PAIN OF RIGHT SHOULDER: ICD-10-CM

## 2019-09-18 DIAGNOSIS — M54.89 OTHER BACK PAIN, UNSPECIFIED CHRONICITY: ICD-10-CM

## 2019-09-18 DIAGNOSIS — M79.18 MYOFASCIAL PAIN SYNDROME, CERVICAL: Primary | ICD-10-CM

## 2019-09-18 NOTE — PROGRESS NOTES
1. Have you been to the ER, urgent care clinic since your last visit? Hospitalized since your last visit? No    2. Have you seen or consulted any other health care providers outside of the [address] [place] [address] since your last visit? Include any pap smears or colon screening.  No
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
## 2019-12-18 ENCOUNTER — HOSPITAL ENCOUNTER (EMERGENCY)
Age: 54
Discharge: HOME | End: 2019-12-18
Attending: EMERGENCY MEDICINE
Payer: SELF-PAY

## 2019-12-18 VITALS
TEMPERATURE: 97.6 F | HEIGHT: 63 IN | HEART RATE: 98 BPM | OXYGEN SATURATION: 97 % | SYSTOLIC BLOOD PRESSURE: 128 MMHG | DIASTOLIC BLOOD PRESSURE: 84 MMHG | RESPIRATION RATE: 18 BRPM | WEIGHT: 140 LBS | BODY MASS INDEX: 24.8 KG/M2

## 2019-12-18 DIAGNOSIS — B34.9 VIRAL SYNDROME: ICD-10-CM

## 2019-12-18 DIAGNOSIS — R10.9 ABDOMINAL CRAMPING: Primary | ICD-10-CM

## 2019-12-18 PROCEDURE — 99281 EMR DPT VST MAYX REQ PHY/QHP: CPT

## 2019-12-18 RX ORDER — DICYCLOMINE HYDROCHLORIDE 20 MG/1
20 TABLET ORAL EVERY 6 HOURS
Qty: 20 TAB | Refills: 0 | Status: SHIPPED | OUTPATIENT
Start: 2019-12-18 | End: 2019-12-23

## 2019-12-18 NOTE — LETTER
[address] [place] [address] Dr [facility] EMERGENCY DEPT 
[address] [facility] [address] [phone] Work/School Note Date: 12/18/2019 To Whom It May concern: 
 
Day Ma was seen and treated today in the emergency room by the following provider(s): 
Attending Provider: Adelina Acuña MD 
Nurse Practitioner: Kareem Cooper NP. Day Ma may return to work on 12/19/2019. Sincerely, Vy Mancia NP

## 2019-12-18 NOTE — ED PROVIDER NOTES
EMERGENCY DEPARTMENT HISTORY AND PHYSICAL EXAM    11:16 AM      Date: 12/18/2019  Patient Name: Sarah Rodrigez    History of Presenting Illness     No chief complaint on file. History Provided By: Patient    Additional History (Context): Sarah Rodrigez is a 53 y.o. female with hx of GERD who presents with complaint of intermittent abdominal cramping for the past 2 days. Patient reports she had a stomach virus and was having diarrhea, her last diarrhea episode was Monday. Patient reports she feels a lot better and would like something for the abdominal cramps and a note for work. Patient denies any fevers, chest pain, nausea, vomiting, dizziness, shortness of breath, back pain,LUTS. PCP: Other, MD Carol    Current Outpatient Medications   Medication Sig Dispense Refill    dicyclomine (BENTYL) 20 mg tablet Take 1 Tab by mouth every six (6) hours for 20 doses. 20 Tab 0    methocarbamol (ROBAXIN) 500 mg tablet Take 1-2 Tabs by mouth two (2) times daily as needed for Pain. 45 Tab 0    naproxen (NAPROSYN) 500 mg tablet Take 500 mg by mouth.  methylPREDNISolone (MEDROL, MEERA,) 4 mg tablet Per dose pack instructions 1 Dose Pack 0    gabapentin (NEURONTIN) 300 mg capsule Take 1 Tab QHS x1 week, then BID x1 week, then TID  Indications: Neuropathic Pain 90 Cap 1    meloxicam (MOBIC) 15 mg tablet Take 1 Tab by mouth daily (with breakfast). 30 Tab 0    famotidine (PEPCID) 40 mg tablet Take 1 Tab by mouth daily. 30 Tab 0    ondansetron (ZOFRAN ODT) 8 mg disintegrating tablet Take 1 Tab by mouth every eight (8) hours as needed for Nausea. 10 Tab 0    albuterol (PROVENTIL HFA, VENTOLIN HFA) 90 mcg/actuation inhaler Take 2 Puffs by inhalation every six (6) hours as needed.  fluticasone (FLOVENT HFA) 44 mcg/actuation inhaler Take 1 Puff by inhalation two (2) times a day.            Past History     Past Medical History:  Past Medical History:   Diagnosis Date    Appetite loss 2013    Arthritis in back    Asthma 2006    Back pain 2012    Chest pain     Degenerative disc disease, lumbar     DUB (dysfunctional uterine bleeding)     Foot pain, left     GERD (gastroesophageal reflux disease)     Headache(784.0)     migraine variant    Irregular heartbeat     Kidney calculus     Kidney stone     Menorrhagia     Pelvic pain in female     Postlaminectomy syndrome     S/P ankle ligament repair, left 2014    Stroke ([address] , , 1998    x3 - no residual    Tobacco abuse     Trichomonas     UTI (lower urinary tract infection)     Wears glasses        Past Surgical History:  Past Surgical History:   Procedure Laterality Date    HX LUMBAR FUSION  2012    L4-L5    HX ORTHOPAEDIC Left 2014    ligament reconstruction    HX TONSILLECTOMY      HX TUBAL LIGATION  1996    x2       Family History:  Family History   Problem Relation Age of Onset    Heart Disease Mother     Hypertension Mother     Stroke Mother     Hypertension Father     Hypertension Sister     Diabetes Sister        Social History:  Social History     Tobacco Use    Smoking status: Current Every Day Smoker     Packs/day: 0.00     Years: 33.00     Pack years: 0.00     Last attempt to quit: 2013     Years since quittin.0    Smokeless tobacco: Never Used   Substance Use Topics    Alcohol use: No     Alcohol/week: 0.0 standard drinks    Drug use: No       Allergies: Allergies   Allergen Reactions    Penicillins Rash         Review of Systems       Review of Systems   Constitutional: Negative for chills and fever. Respiratory: Negative for shortness of breath. Cardiovascular: Negative for chest pain. Gastrointestinal: Positive for abdominal pain. Negative for nausea and vomiting. Skin: Negative for rash. Neurological: Negative for weakness. All other systems reviewed and are negative.         Physical Exam     Visit Vitals  /84 (BP 1 Location: Left arm, BP Patient Position: Sitting)   Pulse 98   Temp 97.6 °F (36.4 °C)   Resp 18   Ht 5' 3\" (1.6 m)   Wt 63.5 kg (140 lb)   LMP 04/26/2015   SpO2 97%   BMI 24.80 kg/m²         Physical Exam  Vitals signs reviewed. Constitutional:       General: She is not in acute distress. Appearance: Normal appearance. She is well-developed. She is not ill-appearing or toxic-appearing. Comments: Patient in no distress. Dressed appropriately for season. HENT:      Head: Normocephalic and atraumatic. Eyes:      Conjunctiva/sclera: Conjunctivae normal.      Pupils: Pupils are equal, round, and reactive to light. Neck:      Musculoskeletal: Normal range of motion. Trachea: Trachea normal.   Cardiovascular:      Rate and Rhythm: Normal rate and regular rhythm. Pulmonary:      Effort: Pulmonary effort is normal.      Breath sounds: Normal breath sounds. Abdominal:      General: Bowel sounds are normal. There is no distension or abdominal bruit. Palpations: Abdomen is soft. Abdomen is not rigid. There is no shifting dullness, fluid wave, mass or pulsatile mass. Tenderness: There is no tenderness. There is no right CVA tenderness, left CVA tenderness or guarding. Negative signs include Bolaños's sign and McBurney's sign. Comments: No abdominal tenderness on exam, no rigidity, no guarding. Neurological:      Mental Status: She is alert and oriented to person, place, and time. Diagnostic Study Results     Labs -  No results found for this or any previous visit (from the past 12 hour(s)). Radiologic Studies -   No orders to display         Medical Decision Making   I am the first provider for this patient. I reviewed the vital signs, available nursing notes, past medical history, past surgical history, family history and social history. Vital Signs-Reviewed the patient's vital signs.     Records Reviewed: Nursing Notes and Old Medical Records (Time of Review: 11:16 AM)    ED Course: Progress Notes, Reevaluation, and Consults:      Provider Notes (Medical Decision Making): Patient afebrile during visit. Did not have any abdominal tenderness during exam, I do not suspect surgical abdomen. Patient in no distress. Patient denies chest pain, nausea, vomiting. Patient go for blood work and UA patient refused reports she feels fine she just want something for the intermittent abdominal cramping and a note for work. Will discharge patient home with Bentyl. Patient mother aware to return to ED if symptoms worsen. Diagnosis     Clinical Impression:   1. Abdominal cramping    2. Viral syndrome        Disposition: home     Follow-up Information     Follow up With Specialties Details Why [address] [facility] EMERGENCY DEPT Emergency Medicine  If symptoms worsen [address] [place] [address] [id number] [place] [address]  Schedule an appointment as soon as possible for a visit As needed Steve [phone]  [phone]           Patient's Medications   Start Taking    DICYCLOMINE (BENTYL) 20 MG TABLET    Take 1 Tab by mouth every six (6) hours for 20 doses. Continue Taking    ALBUTEROL (PROVENTIL HFA, VENTOLIN HFA) 90 MCG/ACTUATION INHALER    Take 2 Puffs by inhalation every six (6) hours as needed. FAMOTIDINE (PEPCID) 40 MG TABLET    Take 1 Tab by mouth daily. FLUTICASONE (FLOVENT HFA) 44 MCG/ACTUATION INHALER    Take 1 Puff by inhalation two (2) times a day. GABAPENTIN (NEURONTIN) 300 MG CAPSULE    Take 1 Tab QHS x1 week, then BID x1 week, then TID  Indications: Neuropathic Pain    MELOXICAM (MOBIC) 15 MG TABLET    Take 1 Tab by mouth daily (with breakfast). METHOCARBAMOL (ROBAXIN) 500 MG TABLET    Take 1-2 Tabs by mouth two (2) times daily as needed for Pain. METHYLPREDNISOLONE (MEDROL, MEERA,) 4 MG TABLET    Per dose pack instructions    NAPROXEN (NAPROSYN) 500 MG TABLET    Take 500 mg by mouth.     ONDANSETRON (ZOFRAN ODT) 8 MG DISINTEGRATING TABLET    Take 1 Tab by mouth every eight (8) hours as needed for Nausea. These Medications have changed    No medications on file   Stop Taking    No medications on file       Dictation disclaimer:  Please note that this dictation was completed with [email or web address], the computer voice recognition software. Quite often unanticipated grammatical, syntax, homophones, and other interpretive errors are inadvertently transcribed by the computer software. Please disregard these errors. Please excuse any errors that have escaped final proofreading.

## 2020-01-27 ENCOUNTER — HOSPITAL ENCOUNTER (EMERGENCY)
Age: 55
Discharge: HOME | End: 2020-01-28
Attending: EMERGENCY MEDICINE
Payer: SELF-PAY

## 2020-01-27 ENCOUNTER — APPOINTMENT (OUTPATIENT)
Dept: GENERAL RADIOLOGY | Age: 55
End: 2020-01-27
Attending: NURSE PRACTITIONER
Payer: SELF-PAY

## 2020-01-27 VITALS
SYSTOLIC BLOOD PRESSURE: 160 MMHG | BODY MASS INDEX: 26.43 KG/M2 | DIASTOLIC BLOOD PRESSURE: 97 MMHG | HEIGHT: 61 IN | RESPIRATION RATE: 12 BRPM | OXYGEN SATURATION: 97 % | WEIGHT: 140 LBS | HEART RATE: 105 BPM | TEMPERATURE: 97.5 F

## 2020-01-27 DIAGNOSIS — Z87.891 SMOKING HISTORY: ICD-10-CM

## 2020-01-27 DIAGNOSIS — J45.21 MILD INTERMITTENT ASTHMA WITH ACUTE EXACERBATION: Primary | ICD-10-CM

## 2020-01-27 PROCEDURE — 71046 X-RAY EXAM CHEST 2 VIEWS: CPT

## 2020-01-27 PROCEDURE — 94640 AIRWAY INHALATION TREATMENT: CPT

## 2020-01-27 PROCEDURE — 99281 EMR DPT VST MAYX REQ PHY/QHP: CPT

## 2020-01-27 PROCEDURE — 74011000250 HC RX REV CODE- 250: Performed by: EMERGENCY MEDICINE

## 2020-01-27 PROCEDURE — 87081 CULTURE SCREEN ONLY: CPT

## 2020-01-27 RX ORDER — IPRATROPIUM BROMIDE AND ALBUTEROL SULFATE 2.5; .5 MG/3ML; MG/3ML
3 SOLUTION RESPIRATORY (INHALATION)
Status: COMPLETED | OUTPATIENT
Start: 2020-01-27 | End: 2020-01-27

## 2020-01-27 RX ORDER — PREDNISONE 10 MG/1
TABLET ORAL
Qty: 1 PACKAGE | Refills: 0 | Status: SHIPPED | OUTPATIENT
Start: 2020-01-27 | End: 2021-01-27

## 2020-01-27 RX ADMIN — IPRATROPIUM BROMIDE AND ALBUTEROL SULFATE 3 ML: .5; 3 SOLUTION RESPIRATORY (INHALATION) at 18:19

## 2020-01-27 NOTE — LETTER
NOTIFICATION RETURN TO WORK / SCHOOL 
 
1/27/2020 7:52 PM 
 
Ms. Miko Iverson [address] [address] To Whom It May Concern: 
 
Miko Iverson is currently under the care of [facility]. She will return to work/school on:   1- If there are questions or concerns please have the patient contact our office. Sincerely, Merlinda Juba ENP-C, MORENOC

## 2020-01-28 NOTE — ED PROVIDER NOTES
[facility]  Emergency Department Treatment Report        7:53 PM Yaw Fox is a 53 y.o. female with a history of asthma who presents to ED with wheezing and cough. No fevers been reported no shortness of breath no chest pain has been reported no distress. Patient states that she is also a cigarette smoker. Patient states she has an inhaler but this happens sometimes and she needs some breathing treatments. No other complaints. No other complaints, associated symptoms or modifying factors at this time. PCP: Other, MD Carol      The history is provided by the patient. No  was used. Cough   This is a recurrent problem. The problem has not changed since onset. The cough is non-productive. There has been no fever. Associated symptoms include sore throat and wheezing. Pertinent negatives include no chest pain, no shortness of breath, no nausea and no vomiting. She has tried nothing for the symptoms. She is a smoker. Her past medical history is significant for asthma.         Past Medical History:   Diagnosis Date    Appetite loss 2013    Arthritis     in back    Asthma 2006    Back pain 8/31/2012    Chest pain     Degenerative disc disease, lumbar     DUB (dysfunctional uterine bleeding)     Foot pain, left     GERD (gastroesophageal reflux disease)     Headache(784.0)     migraine variant    Irregular heartbeat     Kidney calculus     Kidney stone     Menorrhagia     Pelvic pain in female     Postlaminectomy syndrome     S/P ankle ligament repair, left 2/7/2014    St[address] [address] 1988, 1996, 1998    x3 - no residual    Tobacco abuse     Trichomonas     UTI (lower urinary tract infection)     Wears glasses 2007       Past Surgical History:   Procedure Laterality Date    HX LUMBAR FUSION  2012    L4-L5    HX ORTHOPAEDIC Left 02/07/2014    ligament reconstruction    HX TONSILLECTOMY      HX TUBAL LIGATION  1996    x2         Family History: Problem Relation Age of Onset    Heart Disease Mother     Hypertension Mother     Stroke Mother     Hypertension Father     Hypertension Sister     Diabetes Sister        Social History     Socioeconomic History    Marital status:      Spouse name: Not on file    Number of children: Not on file    Years of education: Not on file    Highest education level: Not on file   Occupational History    Not on file   Social Needs    Financial resource strain: Not on file    Food insecurity:     Worry: Not on file     Inability: Not on file    Transportation needs:     Medical: Not on file     Non-medical: Not on file   Tobacco Use    Smoking status: Current Every Day Smoker     Packs/day: 0.00     Years: 33.00     Pack years: 0.00     Last attempt to quit: 2013     Years since quittin.1    Smokeless tobacco: Never Used   Substance and Sexual Activity    Alcohol use: No     Alcohol/week: 0.0 standard drinks    Drug use: No    Sexual activity: Yes     Partners: Male     Birth control/protection: Surgical   Lifestyle    Physical activity:     Days per week: Not on file     Minutes per session: Not on file    Stress: Not on file   Relationships    Social connections:     Talks on phone: Not on file     Gets together: Not on file     Attends [employer] service: Not on file     Active member of club or organization: Not on file     Attends meetings of clubs or organizations: Not on file     Relationship status: Not on file    Intimate partner violence:     Fear of current or ex partner: Not on file     Emotionally abused: Not on file     Physically abused: Not on file     Forced sexual activity: Not on file   Other Topics Concern    Not on file   Social History Narrative    Not on file         ALLERGIES: Penicillins    Review of Systems   Constitutional: Negative for fever. HENT: Positive for sore throat. Negative for congestion, facial swelling and trouble swallowing.     Respiratory: Positive for cough and wheezing. Negative for chest tightness, shortness of breath and stridor. Cardiovascular: Negative for chest pain. Gastrointestinal: Negative for nausea and vomiting. Neurological: Negative for dizziness. All other systems reviewed and are negative. Vitals:    01/27/20 1809   BP: (!) 160/97   Pulse: (!) 105   Resp: 12   Temp: 97.5 °F (36.4 °C)   SpO2: 97%   Weight: 63.5 kg (140 lb)   Height: 5' 0.5\" (1.537 m)            Physical Exam  Vitals signs and nursing note reviewed. Constitutional:       General: She is not in acute distress. Appearance: She is well-developed. She is not ill-appearing, toxic-appearing or diaphoretic. HENT:      Head: Normocephalic and atraumatic. Eyes:      Conjunctiva/sclera: Conjunctivae normal.      Pupils: Pupils are equal, round, and reactive to light. Neck:      Musculoskeletal: Normal range of motion and neck supple. Cardiovascular:      Rate and Rhythm: Normal rate and regular rhythm. Pulmonary:      Effort: Pulmonary effort is normal.      Breath sounds: Wheezing present. Comments: Mild upper anterior expiratory wheeze  Abdominal:      General: Bowel sounds are normal.      Palpations: Abdomen is soft. Musculoskeletal: Normal range of motion. Skin:     General: Skin is warm and dry. Neurological:      Mental Status: She is alert and oriented to person, place, and time. Deep Tendon Reflexes: Reflexes are normal and symmetric. Psychiatric:         Behavior: Behavior normal.         Thought Content: Thought content normal.         Judgment: Judgment normal.          MDM  Number of Diagnoses or Management Options  Mild intermittent asthma with acute exacerbation: minor  Smoking history: minor  Diagnosis management comments: Strep is negative chest x-ray is negative patient improved after breathing treatment here in the emergency department. I will place her on prednisone she has an home.   Patient encouraged to stop smoking I do not suspect any other acute illness at this time. Patient advised to follow-up with primary care stop smoking and return to emergency room if worse. Amount and/or Complexity of Data Reviewed  Clinical lab tests: reviewed and ordered  Tests in the radiology section of CPT®: ordered and reviewed  Review and summarize past medical records: yes  Independent visualization of images, tracings, or specimens: yes    Risk of Complications, Morbidity, and/or Mortality  Presenting problems: low  Diagnostic procedures: low  Management options: low    Patient Progress  Patient progress: improved         Procedures            Vitals:  Patient Vitals for the past 12 hrs:   Temp Pulse Resp BP SpO2   01/27/20 1809 97.5 °F (36.4 °C) (!) 105 12 (!) 160/97 97 %       Medications ordered:   Medications   albuterol-ipratropium (DUO-NEB) 2.5 MG-0.5 MG/3 ML (3 mL Nebulization Given 1/27/20 1819)         Lab findings:  Recent Results (from the past 12 hour(s))   STREP THROAT SCREEN    Collection Time: 01/27/20  6:12 PM   Result Value Ref Range    Special Requests: NO SPECIAL REQUESTS      Strep Screen NEGATIVE       Culture result: PENDING             X-Ray, CT or other radiology findings or impressions:  XR CHEST PA LAT    (Results Pending)     No acute finding per my read        Disposition:    Diagnosis:   1. Mild intermittent asthma with acute exacerbation    2. Smoking history        Disposition: to Home        Follow-up Information     Follow up With Specialties Details Why [address] [facility]  In 2 days  [address] [facility] [address] [phone]           Patient's Medications   Start Taking    PREDNISONE (STERAPRED DS) 10 MG DOSE PACK    6 day dose pack per package instructions   Continue Taking    ALBUTEROL (PROVENTIL HFA, VENTOLIN HFA) 90 MCG/ACTUATION INHALER    Take 2 Puffs by inhalation every six (6) hours as needed.     FAMOTIDINE (PEPCID) 40 MG TABLET Take 1 Tab by mouth daily. FLUTICASONE (FLOVENT HFA) 44 MCG/ACTUATION INHALER    Take 1 Puff by inhalation two (2) times a day. GABAPENTIN (NEURONTIN) 300 MG CAPSULE    Take 1 Tab QHS x1 week, then BID x1 week, then TID  Indications: Neuropathic Pain    MELOXICAM (MOBIC) 15 MG TABLET    Take 1 Tab by mouth daily (with breakfast). METHOCARBAMOL (ROBAXIN) 500 MG TABLET    Take 1-2 Tabs by mouth two (2) times daily as needed for Pain. METHYLPREDNISOLONE (MEDROL, MEERA,) 4 MG TABLET    Per dose pack instructions    NAPROXEN (NAPROSYN) 500 MG TABLET    Take 500 mg by mouth. ONDANSETRON (ZOFRAN ODT) 8 MG DISINTEGRATING TABLET    Take 1 Tab by mouth every eight (8) hours as needed for Nausea. These Medications have changed    No medications on file   Stop Taking    No medications on file       Return to the ER if you are unable to obtain referral as directed. Rubens Hobbs's  results have been reviewed with her. She has been counseled regarding her diagnosis, treatment, and plan. She verbally conveys understanding and agreement of the signs, symptoms, diagnosis, treatment and prognosis and additionally agrees to follow up as discussed. She also agrees with the care-plan and conveys that all of her questions have been answered. I have also provided discharge instructions for her that include: educational information regarding their diagnosis and treatment, and list of reasons why they would want to return to the ED prior to their follow-up appointment, should her condition change. Abhi Varela ENP-C,FNP-C      Dragon voice recognition was used to generate this report, which may have resulted in some phonetic based errors in grammar and contents.  Even though attempts were made to correct all the mistakes, some may have been missed, and remained in the body of the document

## 2020-01-28 NOTE — DISCHARGE INSTRUCTIONS
Patient Education     Learning About Asthma Triggers  What are triggers? When you have asthma, certain things can make your symptoms worse. These are called triggers. They include:  · Cigarette smoke or air pollution. · Things you are allergic to, such as:  ¨ Pollen, mold, or dust mites. ¨ Pet hair, skin, or saliva. · Illnesses, like colds, flu, or pneumonia. · Exercise. · Dry, cold air. How do triggers affect asthma? Triggers can make it harder for your lungs to work as they should and can lead to sudden difficulty breathing and other symptoms. When you are around a trigger, an asthma attack is more likely. If your symptoms are severe, you may need emergency treatment or have to go to the hospital for treatment. If you know what your triggers are and can avoid them, you may be able to prevent asthma attacks, reduce how often you have them, and make them less severe. What can you do to avoid triggers? The first thing is to know your triggers. When you are having symptoms, note the things around you that might be causing them. Then look for patterns in what may be triggering your symptoms. Record your triggers on a piece of paper or in an asthma diary. When you have your list of possible triggers, work with your doctor to find ways to avoid them. You also can check how well your lungs are working by measuring your peak expiratory flow (PEF) throughout the day. Your PEF may drop when you are near things that trigger symptoms. Here are some ways to avoid a few common triggers. · Do not smoke or allow others to smoke around you. If you need help quitting, talk to your doctor about stop-smoking programs and medicines. These can increase your chances of quitting for good. · If there is a lot of pollution, pollen, or dust outside, stay at home and keep your windows closed. Use an air conditioner or air filter in your home.  Check your local weather report or newspaper for air quality and pollen reports. · Get a flu shot every year. Talk to your doctor about getting a pneumococcal shot. Wash your hands often to prevent infections. · Avoid exercising outdoors in cold weather. If you are outdoors in cold weather, wear a scarf around your face and breathe through your nose. How can you manage an asthma attack? · If you have an asthma action plan, follow the plan. In general:  ¨ Use your quick-relief inhaler as directed by your doctor. If your symptoms do not get better after you use your medicine, have someone take you to the emergency room. Call an ambulance if needed. ¨ If your doctor has given you other inhaled medicines or steroid pills, take them as directed. Where can you learn more? Go to [employer]  Enter M564 in the search box to learn more about \"Learning About Asthma Triggers. \"   © 9248-2221 Healthwise, Incorporated. Care instructions adapted under license by [facility] (which disclaims liability or warranty for this information). This care instruction is for use with your licensed healthcare professional. If you have questions about a medical condition or this instruction, always ask your healthcare professional. [place] [address] any warranty or liability for your use of this information. Content Version: 94.4.389039; Last Revised: February 23, 2012                 Patient Education        Asthma Attack: Care Instructions  Your Care Instructions    During an asthma attack, the airways swell and narrow. This makes it hard to breathe. Severe asthma attacks can be life-threatening, but you can help prevent them by keeping your asthma under control and treating symptoms before they get bad. Symptoms include being short of breath, having chest tightness, coughing, and wheezing. Noting and treating these symptoms can also help you avoid future trips to the emergency room. The doctor has checked you carefully, but problems can develop later.  If you notice any problems or new symptoms, get medical treatment right away. Follow-up care is a key part of your treatment and safety. Be sure to make and go to all appointments, and call your doctor if you are having problems. It's also a good idea to know your test results and keep a list of the medicines you take. How can you care for yourself at home? · Follow your asthma action plan to prevent and treat attacks. If you don't have an asthma action plan, work with your doctor to create one. · Take your asthma medicines exactly as prescribed. Talk to your doctor right away if you have any questions about how to take them. ? Use your quick-relief medicine when you have symptoms of an attack. Quick-relief medicine is usually an albuterol inhaler. Some people need to use quick-relief medicine before they exercise. ? Take your controller medicine every day, not just when you have symptoms. Controller medicine is usually an inhaled corticosteroid. The goal is to prevent problems before they occur. Don't use your controller medicine to treat an attack that has already started. It doesn't work fast enough to help. ? If your doctor prescribed corticosteroid pills to use during an attack, take them exactly as prescribed. It may take hours for the pills to work, but they may make the episode shorter and help you breathe better. ? Keep your quick-relief medicine with you at all times. · Talk to your doctor before using other medicines. Some medicines, such as aspirin, can cause asthma attacks in some people. · If you have a peak flow meter, use it to check how well you are breathing. This can help you predict when an asthma attack is going to occur. Then you can take medicine to prevent the asthma attack or make it less severe. · Do not smoke or allow others to smoke around you. Avoid smoky places. Smoking makes asthma worse. If you need help quitting, talk to your doctor about stop-smoking programs and medicines. These can increase your chances of quitting for good. · Learn what triggers an asthma attack for you, and avoid the triggers when you can. Common triggers include colds, smoke, air pollution, dust, pollen, mold, pets, cockroaches, stress, and cold air. · Avoid colds and the flu. Get a pneumococcal vaccine shot. If you have had one before, ask your doctor if you need a second dose. Get a flu vaccine every fall. If you must be around people with colds or the flu, wash your hands often. When should you call for help? Call 911 anytime you think you may need emergency care. For example, call if:    · You have severe trouble breathing.    Call your doctor now or seek immediate medical care if:    · Your symptoms do not get better after you have followed your asthma action plan.     · You have new or worse trouble breathing.     · Your coughing and wheezing get worse.     · You cough up dark brown or bloody mucus (sputum).     · You have a new or higher fever.    Watch closely for changes in your health, and be sure to contact your doctor if:    · You need to use quick-relief medicine on more than 2 days a week (unless it is just for exercise).     · You cough more deeply or more often, especially if you notice more mucus or a change in the color of your mucus.     · You are not getting better as expected. Where can you learn more? Go to http://ilsa-bolivar.info/. Enter J756 in the search box to learn more about \"Asthma Attack: Care Instructions. \"  Current as of: June 9, 2019  Content Version: 12.2  © 3968-3960 Healthwise, Incorporated. Care instructions adapted under license by [employer] (which disclaims liability or warranty for this information). If you have questions about a medical condition or this instruction, always ask your healthcare professional. [address] any warranty or liability for your use of this information.

## 2020-01-29 LAB
B-HEM STREP THROAT QL CULT: NEGATIVE
BACTERIA SPEC CULT: NORMAL
SERVICE CMNT-IMP: NORMAL

## 2020-05-05 ENCOUNTER — DOCUMENTATION ONLY (OUTPATIENT)
Dept: ORTHOPEDIC SURGERY | Age: 55
End: 2020-05-05

## 2020-05-05 NOTE — PROGRESS NOTES
Rec. Check [id number] from [employer] ([phone] and letter stating that the check was to pay on account. Faxed copy and emailed Amadeo Montoya as to what needs to be done if charge is not there to deposit to.

## 2020-05-05 NOTE — PROGRESS NOTES
Andi's instructions were to send any accounts receivable checks to Genie Alcala or Bc Hernandez at the [facility].

## 2020-12-04 ENCOUNTER — HOSPITAL ENCOUNTER (EMERGENCY)
Age: 55
Discharge: STILL ADMITTED OR ADMINISTRATIVE | End: 2020-12-04

## 2021-01-27 ENCOUNTER — VIRTUAL VISIT (OUTPATIENT)
Dept: ORTHOPEDIC SURGERY | Age: 56
End: 2021-01-27
Payer: MEDICAID

## 2021-01-27 DIAGNOSIS — M54.50 LUMBAR PAIN: ICD-10-CM
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
## 2021-01-27 PROCEDURE — 99443 PR PHYS/QHP TELEPHONE EVALUATION 21-30 MIN: CPT | Performed by: NURSE PRACTITIONER

## 2021-01-27 RX ORDER — GABAPENTIN 300 MG/1
CAPSULE ORAL
Qty: 90 CAP | Refills: 1 | Status: SHIPPED | OUTPATIENT
Start: 2021-01-27 | End: 2021-03-10 | Stop reason: DRUGHIGH

## 2021-01-27 RX ORDER — METHYLPREDNISOLONE 4 MG/1
TABLET ORAL
Qty: 1 DOSE PACK | Refills: 0 | Status: SHIPPED | OUTPATIENT
Start: 2021-01-27 | End: 2021-03-10 | Stop reason: ALTCHOICE
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
## 2021-02-04 ENCOUNTER — TELEPHONE (OUTPATIENT)
Dept: ORTHOPEDIC SURGERY | Age: 56
End: 2021-02-04

## 2021-02-04 NOTE — TELEPHONE ENCOUNTER
Patient called to report that she completed the Medrol as prescribed and has started the Gabapentin but she's not getting any relief. She's requesting a different medication that may be more effective for her. Please review and call new script in to Corina Ramirez on file. Patient can also be reached at [phone] if needed.

## 2021-02-04 NOTE — TELEPHONE ENCOUNTER
I called and spoke to Ms. Ana Lilia Rayray. The pt was identified using 2 pt identifiers. She was informed of the provider's response to her recent call. The pt verbalized understanding and will continue to take the medication. She states that she has just started taking 2 a day. No side effects noted at this time. Pt will contact the office if she has any issues with the increase in medication.

## 2021-02-04 NOTE — TELEPHONE ENCOUNTER
The gabapentin will take time to become effective. She was just restarted on this about a week ago. She should be tapering up on it. She is to taper to 300 mg TID, we can always increase the dose once she is tolerating the medication.

## 2021-02-05 NOTE — TELEPHONE ENCOUNTER
Patient called again in regards to this. She stated she is still in a lot of pain and the medication is not helping. Please advise patient at [phone].

## 2021-02-08 NOTE — TELEPHONE ENCOUNTER
I called and spoke to the pt. The pt was identified using 2 pt identifiers. She was advised to taper up to 300 mg three times a day. The pt verbalized understanding. She states that she is still at 2 times a day. The pt is not having any side effects at this time. She will continue to take the medication and will contact the office in 2 weeks if there is no improvement. No other questions or concerns voiced from the pt at this time.
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
## 2021-03-02 DIAGNOSIS — M54.50 LUMBAR PAIN: ICD-10-CM

## 2021-03-02 RX ORDER — GABAPENTIN 300 MG/1
300 CAPSULE ORAL 3 TIMES DAILY
Qty: 90 CAP | Refills: 1 | OUTPATIENT
Start: 2021-03-02

## 2021-03-02 NOTE — TELEPHONE ENCOUNTER
Patient states she only has 2-3 days left    Last Visit: 1/27/21 with NP Thanh Dotson  Next Appointment: 3/24/21 with KATHARINE Luciano  Previous Refill Encounter(s): 1/27/21 #90 with 1 refill    Requested Prescriptions     Pending Prescriptions Disp Refills    gabapentin (NEURONTIN) 300 mg capsule 90 Cap 1     Sig: Take 1 Cap by mouth three (3) times daily. Max Daily Amount: 900 mg.  Indications: neuropathic pain

## 2021-03-10 ENCOUNTER — OFFICE VISIT (OUTPATIENT)
Dept: ORTHOPEDIC SURGERY | Age: 56
End: 2021-03-10
Payer: MEDICAID

## 2021-03-10 VITALS
RESPIRATION RATE: 24 BRPM | WEIGHT: 130 LBS | TEMPERATURE: 98.4 F | SYSTOLIC BLOOD PRESSURE: 136 MMHG | OXYGEN SATURATION: 97 % | BODY MASS INDEX: 24.55 KG/M2 | DIASTOLIC BLOOD PRESSURE: 84 MMHG | HEIGHT: 61 IN | HEART RATE: 88 BPM

## 2021-03-10 DIAGNOSIS — M54.16 LUMBAR RADICULOPATHY: ICD-10-CM

## 2021-03-10 DIAGNOSIS — Z98.1 S/P LUMBAR FUSION: Primary | ICD-10-CM

## 2021-03-10 DIAGNOSIS — Z98.1 S/P LUMBAR FUSION: ICD-10-CM

## 2021-03-10 PROCEDURE — 99213 OFFICE O/P EST LOW 20 MIN: CPT | Performed by: NURSE PRACTITIONER

## 2021-03-10 PROCEDURE — 72100 X-RAY EXAM L-S SPINE 2/3 VWS: CPT | Performed by: NURSE PRACTITIONER

## 2021-03-10 RX ORDER — GABAPENTIN 600 MG/1
600 TABLET ORAL 3 TIMES DAILY
Qty: 270 TAB | Refills: 0 | Status: SHIPPED | OUTPATIENT
Start: 2021-03-10 | End: 2021-05-11 | Stop reason: SDUPTHER

## 2021-03-10 NOTE — LETTER
NOTIFICATION OF RETURN TO WORK / SCHOOL 
 
3/10/2021 9:46 AM 
 
Ms. Kaitlynn Randhawa [address] [place] [id number] To Whom It May Concern: 
 
Kaitlynn Nichole is under the care of [address] [facility]. Please excuse her from work today, 3/10/2021. If there are questions or concerns please have the patient contact our office. Sincerely, Sanjuanita Sr NP
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
## 2021-03-10 NOTE — PROGRESS NOTES
Hawk Blum presents today for   Chief Complaint   Patient presents with    Back Pain       Is someone accompanying this pt? no    Is the patient using any DME equipment during OV? no    Depression Screening:  3 most recent PHQ Screens 9/18/2019   Little interest or pleasure in doing things Not at all   Feeling down, depressed, irritable, or hopeless Not at all   Total Score PHQ 2 0       Learning Assessment:  Learning Assessment 9/9/2019   PRIMARY LEARNER Patient   PRIMARY LANGUAGE ENGLISH   LEARNER PREFERENCE PRIMARY LISTENING   ANSWERED BY patient   RELATIONSHIP SELF         Coordination of Care:  1. Have you been to the ER, urgent care clinic since your last visit? no  Hospitalized since your last visit? no    2. Have you seen or consulted any other health care providers outside of the [address] [place] [address] since your last visit? Yes, pcp Include any pap smears or colon screening.  no

## 2021-03-30 ENCOUNTER — HOSPITAL ENCOUNTER (OUTPATIENT)
Age: 56
Discharge: HOME | End: 2021-03-30
Attending: NURSE PRACTITIONER
Payer: MEDICAID

## 2021-03-30 PROCEDURE — 72158 MRI LUMBAR SPINE W/O & W/DYE: CPT

## 2021-03-30 PROCEDURE — 74011636320 HC RX REV CODE- 636/320: Performed by: NURSE PRACTITIONER

## 2021-03-30 PROCEDURE — A9575 INJ GADOTERATE MEGLUMI 0.1ML: HCPCS | Performed by: NURSE PRACTITIONER

## 2021-03-30 RX ADMIN — GADOTERATE MEGLUMINE 12 ML: 376.9 INJECTION INTRAVENOUS at 13:00

## 2021-04-05 ENCOUNTER — OFFICE VISIT (OUTPATIENT)
Dept: ORTHOPEDIC SURGERY | Age: 56
End: 2021-04-05
Payer: MEDICAID

## 2021-04-05 VITALS
TEMPERATURE: 98.6 F | BODY MASS INDEX: 28.13 KG/M2 | WEIGHT: 149 LBS | HEIGHT: 61 IN | HEART RATE: 92 BPM | OXYGEN SATURATION: 99 %

## 2021-04-05 DIAGNOSIS — Z98.1 S/P LUMBAR FUSION: ICD-10-CM

## 2021-04-05 DIAGNOSIS — M48.061 SPINAL STENOSIS AT L4-L5 LEVEL: ICD-10-CM

## 2021-04-05 DIAGNOSIS — M54.16 LUMBAR RADICULOPATHY: Primary | ICD-10-CM

## 2021-04-05 PROCEDURE — 99212 OFFICE O/P EST SF 10 MIN: CPT | Performed by: PHYSICAL MEDICINE & REHABILITATION

## 2021-04-05 RX ORDER — MEDROXYPROGESTERONE ACETATE 150 MG/ML
INJECTION, SUSPENSION INTRAMUSCULAR
COMMUNITY
Start: 2021-03-08 | End: 2022-09-14

## 2021-04-05 RX ORDER — LANOLIN ALCOHOL/MO/W.PET/CERES
CREAM (GRAM) TOPICAL
COMMUNITY
Start: 2021-03-13 | End: 2021-09-24
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
## 2021-04-05 NOTE — PROGRESS NOTES
Zain Sherman presents today for   Chief Complaint   Patient presents with    Back Pain       Is someone accompanying this pt? no    Is the patient using any DME equipment during OV? no    Depression Screening:  3 most recent PHQ Screens 4/5/2021   Little interest or pleasure in doing things Not at all   Feeling down, depressed, irritable, or hopeless Not at all   Total Score PHQ 2 0       Learning Assessment:  Learning Assessment 9/9/2019   PRIMARY LEARNER Patient   PRIMARY LANGUAGE ENGLISH   LEARNER PREFERENCE PRIMARY LISTENING   ANSWERED BY patient   RELATIONSHIP SELF       Coordination of Care:  1. Have you been to the ER, urgent care clinic since your last visit? no  Hospitalized since your last visit? no    2. Have you seen or consulted any other health care providers outside of the [address] [place] [address] since your last visit? no Include any pap smears or colon screening.  no
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
## 2021-04-07 ENCOUNTER — OFFICE VISIT (OUTPATIENT)
Dept: ORTHOPEDIC SURGERY | Age: 56
End: 2021-04-07
Payer: MEDICAID

## 2021-04-07 VITALS
HEIGHT: 61 IN | WEIGHT: 132 LBS | RESPIRATION RATE: 16 BRPM | OXYGEN SATURATION: 98 % | BODY MASS INDEX: 24.92 KG/M2 | HEART RATE: 104 BPM

## 2021-04-07 DIAGNOSIS — G89.29 CHRONIC PAIN OF LEFT ANKLE: ICD-10-CM

## 2021-04-07 DIAGNOSIS — M25.572 CHRONIC PAIN OF LEFT ANKLE: ICD-10-CM

## 2021-04-07 DIAGNOSIS — M25.372 INSTABILITY OF LEFT ANKLE JOINT: Primary | ICD-10-CM

## 2021-04-07 PROCEDURE — 99203 OFFICE O/P NEW LOW 30 MIN: CPT | Performed by: ORTHOPAEDIC SURGERY

## 2021-04-07 PROCEDURE — 73610 X-RAY EXAM OF ANKLE: CPT | Performed by: ORTHOPAEDIC SURGERY

## 2021-04-07 NOTE — PROGRESS NOTES
AMBULATORY PROGRESS NOTE      Patient: Carey Baldwin             MRN: [id number]     SSN: [id number] Body mass index is 25.36 kg/m². YOB: 1965     AGE: 61 y.o. EX: female    PCP: Carol Isabel MD       IMPRESSION //  DIAGNOSIS AND TREATMENT PLAN        Carey Baldwin has is having some pain to the anterior portion of her left ankle. It is to be recalled she had a ankle ligament reconstruction left ankle, many years ago clinically the 2013 or 2014. She had a history of TIAs, in the past, prior to her ankle sprains prior to her ankle surgery. She is currently seeing the spine team, for epidural steroid injections, for her back. She works with [employer], as a , but called out from work for the last few days. DIAGNOSES  1. Instability of left ankle joint    2. Chronic pain of left ankle        Orders Placed This Encounter    AMB POC XRAY, ANKLE; COMPLETE, 3+ VIE     ASK ALL FEMALE PATIENTS IF THEY ARE PREGNANT     Order Specific Question:   Reason for Exam     Answer:   PAIN    MRI ANKLE LT WO CONT     Standing Status:   Future     Standing Expiration Date:   5/7/2021     Order Specific Question:   Arthrogram study     Answer:   No     Order Specific Question:   Reason for Exam     Answer:   eval peroneal tendon and ligaments        PLAN:    1. MRI of the left ankle to assess anterior ligaments and anterolateral talar dome was recommended  2. At work note to keep her out of work for the next 2 weeks will be written for her reassess her, after her ankle MRI    RTO-  After MRI    Carey Baldwin  expresses understanding of the diagnosis, treatment plan, and all of their proposed questions were answered to their satisfaction. Patient education has been provided re the diagnoses. Carey Baldwin may have a reminder for a \"due or due soon\" health maintenance.  I have asked that she contact her primary care provider for follow-up on this health maintenance. HPI //  [address] [facility] A 61 y.o. female who is a/an  established patient, presenting to my outpatient office for evaluation of  the following chief complaint(s):     Chief Complaint   Patient presents with    Ankle Pain     left ankle       She see me today, for chief complaint, is anterolateral left ankle pain. She takes gabapentin, for neuropathy, and is under the care of a spine team, for what sounds like a discogenic radiculopathy causing radiculopathy. She had a left ankle ligament reconstruction, 2014, modified Broström type procedure. She works at [employer] as a as a , but missed work for the last few days. Chief complaint, anterolateral left ankle pain and she feels some instability is what she describes. She had x-rays of her left ankle, 3 views, today, AP lateral bleak, shows normal alignment no fracture no solution of dislocation. There is a slight lucency seen the distal fibula, this is where she may had a [employer] suture anchor, in place of the distal fibula. Visit Vitals  Pulse (!) 104   Resp 16   Ht 5' 0.5\" (1.537 m)   Wt 132 lb (59.9 kg)   LMP 04/26/2015   SpO2 98%   BMI 25.36 kg/m²       Appearance: Alert, well appearing and pleasant patient who is in no distress, oriented to person, place/time, and who follows commands. This patient is accompanied in the examination room by her  self. There is signs of: no dementia  Psychiatric: Affect/mood are appropriate. Speech normal in context and clarity, memory intact grossly, no involuntary movements - tremors. Patient arrives to office via: without assistive device:    H EENT (2): Head normocephalic & atraumatic. Both pupils are round     Eye: EOM are intact // Neck: ROM WNL  //  Hearings Intact   Respiratory: Breathing non labored     ANKLE/FOOT left    Gait: normal  Tenderness: mild  ATFL REGION //  Cutaneous:    Well healed remote surgery scar  Joint Motion:   WNL  Joint / Tendon Stability: 1 PLUS anterolateral Ankle or Subtalar instability or joint laxity. No peroneal sublux ability or dislocation  Alignment: neutral Hindfoot,    Neuro Motor/Sensory: NL/NL  Vascular: NL foot/ankle pulses,   Lymphatics: No extremity lymphedema, No calf swelling, no tenderness to calf muscles. CHART REVIEW     Diane Prescott has been experiencing pain and discomfort confirmed as outlined in the pain assessment outlined below.  was reviewed by Sanjeev Gamboa MD on 4/7/2021. Pain Assessment  4/7/2021   Location of Pain Ankle   Location Modifiers Left   Severity of Pain 5   Quality of Pain Sharp   Quality of Pain Comment -   Duration of Pain Persistent   Frequency of Pain Constant   Date Pain First Started -   Aggravating Factors Walking;Standing   Aggravating Factors Comment -   Limiting Behavior Yes   Relieving Factors Rest   Relieving Factors Comment -   Result of Injury -   Work-Related Injury -   Type of Injury -   Type of Injury Comment -        Diane Prescott  has a past medical history of Appetite loss (2013), Arthritis, Asthma (2006), Back pain (8/31/2012), Chest pain, Degenerative disc disease, lumbar, DUB (dysfunctional uterine bleeding), Foot pain, left, GERD (gastroesophageal reflux disease), Headache(784.0), Irregular heartbeat, Kidney calculus, Kidney stone, Menorrhagia, Pelvic pain in female, Postlaminectomy syndrome, S/P ankle ligament repair, left (2/7/2014), [facility] ([facility], 1996, 1998), Tobacco abuse, Trichomonas, UTI (lower urinary tract infection), and Wears glasses (2007).      Patients is employed at:         Past Medical History:   Diagnosis Date    Appetite loss 2013    Arthritis     in back    Asthma 2006    Back pain 8/31/2012    Chest pain     Degenerative disc disease, lumbar     DUB (dysfunctional uterine bleeding)     Foot pain, left     GERD (gastroesophageal reflux disease)  Headache(784.0)     migraine variant    Irregular heartbeat     Kidney calculus     Kidney stone     Menorrhagia     Pelvic pain in female     Postlaminectomy syndrome     S/P ankle ligament repair, left 2014    Stroke ([facility] [address] , , 1998    x3 - no residual    Tobacco abuse     Trichomonas     UTI (lower urinary tract infection)     Wears glasses      Past Surgical History:   Procedure Laterality Date    HX LUMBAR FUSION  2012    L4-L5    HX ORTHOPAEDIC Left 2014    ligament reconstruction    HX TONSILLECTOMY      HX TUBAL LIGATION  1996    x2     Current Outpatient Medications   Medication Sig    magnesium oxide (MAG-OX) 400 mg tablet TAKE 1 TABLET BY MOUTH ONCE DAILY AS NEEDED    medroxyPROGESTERone (DEPO-PROVERA) 150 mg/mL syrg INJECT 1ML INTRAMUSCULARLY EVERY 90 DAYS    gabapentin (NEURONTIN) 600 mg tablet Take 1 Tab by mouth three (3) times daily. Max Daily Amount: 1,800 mg.    albuterol (PROVENTIL HFA, VENTOLIN HFA) 90 mcg/actuation inhaler Take 2 Puffs by inhalation every six (6) hours as needed.  fluticasone (FLOVENT HFA) 44 mcg/actuation inhaler Take 1 Puff by inhalation two (2) times a day. No current facility-administered medications for this visit.       Allergies   Allergen Reactions    Penicillins Rash     Social History     Occupational History    Not on file   Tobacco Use    Smoking status: Current Every Day Smoker     Packs/day: 0.00     Years: 33.00     Pack years: 0.00     Last attempt to quit: 2013     Years since quittin.3    Smokeless tobacco: Never Used   Substance and Sexual Activity    Alcohol use: No     Alcohol/week: 0.0 standard drinks    Drug use: No    Sexual activity: Yes     Partners: Male     Birth control/protection: Surgical     Family History   Problem Relation Age of Onset    Heart Disease Mother     Hypertension Mother     Stroke Mother     Hypertension Father     Hypertension Sister     Diabetes Sister DIAGNOSTIC LAB DATA      No results found for: HBA1C, HGBE8, PUT9BZKL //   Lab Results   Component Value Date/Time    Glucose 77 07/26/2019 07:58 PM        No results found for: TDK4JSQF, XMQ1GKPI      No results found for: [place], XQVID3, XQVID, VD3RIA, [id number]      REVIEW OF SYSTEMS : 4/7/2021  ALL BELOW ARE Negative except : SEE HPI     All other systems reviewed and are negative. 12 point review of systems otherwise negative unless noted in HPI. DIAGNOSTIC IMAGING /ORDERS      ANKLE X RAYS 3 VIEWS LEFT  X RAYS AT [address] [place] [address]  4/7/2021    NON WEIGHT BEARING    X RAYS AT [address] [place] [address]  4/7/2021    Bones: No fractures or dislocations. No focal osteolytic or osteoblastic process     Bone Spurs: No significant bone spurs//I can see a lucency at the distal fibula, where a suture anchor, had in place. Alignment: Ankle mortise alignment is congruent, Tibial plafond and talar dome intact. No Osteochondral defects seen   Joint: No Significant OA changes present  Soft Tissues: Normal, No radiopaque foreign body     No abnormal calcific densities to soft tissues    No ankle joint effusion in lateral projection. Mineralization: Suggests no Osteopenia    I have reviewed the results of the above study. The interpretation of this study is my professional opinion. On this date 04/07/2021 I have spent 30 minutes reviewing previous notes, test results and face to face with the patient discussing the diagnosis and importance of compliance with the treatment plan as well as documenting on the day of the visit. An electronic signature was used to authenticate this note. Faisal Rios MD  4/7/2021  4:52 PM      Disclaimer: Sections of this note are dictated using utilizing voice recognition software, which may have resulted in some phonetic based errors in grammar and contents.  Even though attempts were made to correct all the mistakes, some may have been missed, and remained in the body of the document. If questions arise, please contact our department.      Raz Mena MD  4/7/2021  4:52 PM

## 2021-04-07 NOTE — LETTER
NOTIFICATION RETURN TO WORK / SCHOOL 
 
4/7/2021 4:51 PM 
 
Ms. Alee Beebe [address] [id number] To Whom It May Concern: 
 
Alee Beebe is currently under the care of [address] [place], [place] [address]terra Chaudhari. She will be out of work from today X 2 weeks. If there are questions or concerns please have the patient contact our office.  
 
 
 
Sincerely, 
 
 
Carrie Aburto MD

## 2021-04-08 DIAGNOSIS — G89.29 CHRONIC PAIN OF LEFT ANKLE: ICD-10-CM

## 2021-04-08 DIAGNOSIS — M25.572 CHRONIC PAIN OF LEFT ANKLE: ICD-10-CM

## 2021-04-28 ENCOUNTER — HOSPITAL ENCOUNTER (OUTPATIENT)
Age: 56
Discharge: HOME | End: 2021-04-28
Attending: ORTHOPAEDIC SURGERY
Payer: MEDICAID

## 2021-04-28 PROCEDURE — 73721 MRI JNT OF LWR EXTRE W/O DYE: CPT

## 2021-05-03 ENCOUNTER — OFFICE VISIT (OUTPATIENT)
Dept: ORTHOPEDIC SURGERY | Age: 56
End: 2021-05-03
Payer: MEDICAID

## 2021-05-03 VITALS — OXYGEN SATURATION: 99 % | TEMPERATURE: 97.4 F | HEART RATE: 93 BPM

## 2021-05-03 DIAGNOSIS — M76.72 PERONEAL TENDINITIS OF LEFT LOWER EXTREMITY: Primary | ICD-10-CM

## 2021-05-03 DIAGNOSIS — M25.572 CHRONIC PAIN OF LEFT ANKLE: ICD-10-CM

## 2021-05-03 DIAGNOSIS — M25.372 INSTABILITY OF LEFT ANKLE JOINT: ICD-10-CM

## 2021-05-03 DIAGNOSIS — G89.29 CHRONIC PAIN OF LEFT ANKLE: ICD-10-CM

## 2021-05-03 PROCEDURE — 20550 NJX 1 TENDON SHEATH/LIGAMENT: CPT | Performed by: ORTHOPAEDIC SURGERY

## 2021-05-03 PROCEDURE — 99213 OFFICE O/P EST LOW 20 MIN: CPT | Performed by: ORTHOPAEDIC SURGERY

## 2021-05-03 RX ORDER — TRIAMCINOLONE ACETONIDE 40 MG/ML
20 INJECTION, SUSPENSION INTRA-ARTICULAR; INTRAMUSCULAR ONCE
Status: COMPLETED | OUTPATIENT
Start: 2021-05-03 | End: 2021-05-03

## 2021-05-03 RX ADMIN — TRIAMCINOLONE ACETONIDE 20 MG: 40 INJECTION, SUSPENSION INTRA-ARTICULAR; INTRAMUSCULAR at 09:51

## 2021-05-03 NOTE — PROGRESS NOTES
AMBULATORY PROGRESS NOTE      Patient: Amrita Bridges             MRN: [id number]     SSN: [id number] There is no height or weight on file to calculate BMI. YOB: 1965     AGE: 61 y.o. EX: female    PCP: Carol Isabel MD       IMPRESSION //  DIAGNOSIS AND TREATMENT PLAN        Amrita Bridges has a diagnosis of:      DIAGNOSES  1. Peroneal tendinitis of left lower extremity    2. Chronic pain of left ankle    3. Instability of left ankle joint        Orders Placed This Encounter    INJECT TENDON SHEATH/LIGAMENT    Generic Supply Order     Left AS/AC brace    triamcinolone acetonide (KENALOG-40) 40 mg/mL injection 20 mg        I did discuss the MRI findings with her. I did review the MRI images, I did see the MRI images, I did review and see the MRI report, as well. Does show her MRI does show some attenuation of her lateral ligamentous structures, but I see no tear or significant pathology at the peroneal tendons in terms of having significant interstitial tendinopathy on this left side. As such, recommendations for cortisone injection to the left peroneal tendon sheath. This is conducted, sterile technique, as listed as below    Plan is to give her gas per [employer] brace, as well as see her in about 3 weeks time to reassess her progress. PLAN:    1. I will adminster a cortisine injection in her left peroneal tendon today. 2. I will write a DME order: left AS/AC brace. 3. I anticipate providing a referral to PT if pain does not subside. RTO-  3 weeks    Amrita Bridges  expresses understanding of the diagnosis, treatment plan, and all of their proposed questions were answered to their satisfaction. Patient education has been provided re the diagnoses. Amrita Brdiges may have a reminder for a \"due or due soon\" health maintenance. I have asked that she contact her primary care provider for follow-up on this health maintenance.         HPI // OBJECTIVE EXAMINATION        Nayla Moore IS A 61 y.o. female who is a/an  established patient, presenting to my outpatient office for evaluation of  the following chief complaint(s):     Chief Complaint   Patient presents with    Ankle Pain     left, MRI review       At [address] [place] [address] patient presented with chief compliant of anterolateral left ankle pain. She was taking gabapentin, for neuropathy, and was under the care of a spine team, for what sounded like a discogenic radiculopathy causing radiculopathy.      She had a left ankle ligament reconstruction, 2014, modified Broström type procedure. She working at [employer] as a , but missed work for the last few days. Chief complaint, anterolateral left ankle pain and she felt some instability is what she described. A MRI of the left ankle to assess anterior ligaments and anterolateral talar dome was recommended. I provided a work note to keep her out of work for the next 2 weeks will be written for her reassess her, after her ankle MRI     She had x-rays of her left ankle, 3 views, today, AP lateral bleak, shows normal alignment no fracture no solution of dislocation. There is a slight lucency seen the distal fibula, this is where she may had a [employer] suture anchor, in place of the distal fibula. Since LOV patient reports continued left ankle pain that radiates up to her left calf that is constant every day. She states her left ankle pain is alleviated with elevation. Pt mentions she has had a Cortisone injection before her left ankle surgery in 2014 that provided much relief and would like one today. Pt notes she will be getting a shot in her lumbar tomorrow. Visit Vitals  Pulse 93   Temp 97.4 °F (36.3 °C) (Skin)   LMP 04/26/2015   SpO2 99%       Appearance: Alert, well appearing and pleasant patient who is in no distress, oriented to person, place/time, and who follows commands. This patient is accompanied in the examination room by her  self. There is signs of: no dementia  Psychiatric: Affect/mood are appropriate. Speech normal in context and clarity, memory intact grossly, no involuntary movements - tremors. Patient arrives to office via: without assistive device. H EENT (2): Head normocephalic & atraumatic. Eye: pupils are round// EOM are intact // Neck: ROM WNL  // Hearings Intact   Respiratory: Breathing non labored     ANKLE/FOOT left    Gait: slow  Tenderness: mild peroneal tendon sheath region, greater the anterolateral portion of this left ankle  Cutaneous:   WNL. Joint Motion:   WNL  Joint / Tendon Stability: No Ankle or Subtalar instability or joint laxity. No peroneal sublux ability or dislocation  Alignment: neutral Hindfoot,    Neuro Motor/Sensory: NL/NL  Vascular: NL foot/ankle pulses,   Lymphatics: No extremity lymphedema, No calf swelling, no tenderness to calf muscles. CHART REVIEW     Zain Sherman has been experiencing pain and discomfort confirmed as outlined in the pain assessment outlined below.  was reviewed by Jalil Hernandez MD on 5/3/2021.      Pain Assessment  5/3/2021   Location of Pain Ankle   Location Modifiers Left   Severity of Pain 5   Quality of Pain Aching   Quality of Pain Comment -   Duration of Pain Persistent   Frequency of Pain Constant   Date Pain First Started -   Aggravating Factors Standing;Walking   Aggravating Factors Comment -   Limiting Behavior Yes   Relieving Factors Nothing   Relieving Factors Comment -   Result of Injury No   Work-Related Injury -   Type of Injury -   Type of Injury Comment -        Zain Sherman  has a past medical history of Appetite loss (2013), Arthritis, Asthma (2006), Back pain (8/31/2012), Chest pain, Degenerative disc disease, lumbar, DUB (dysfunctional uterine bleeding), Foot pain, left, GERD (gastroesophageal reflux disease), Headache(784.0), Irregular heartbeat, Kidney calculus, Kidney stone, Menorrhagia, Pelvic pain in female, Postlaminectomy syndrome, S/P ankle ligament repair, left (2/7/2014), Stroke ([address] (1988, 1996, 1998), Tobacco abuse, Trichomonas, UTI (lower urinary tract infection), and Wears glasses (2007). Patients is employed at: Unemployed at this time. Past Medical History:   Diagnosis Date    Appetite loss 2013    Arthritis     in back    Asthma 2006    Back pain 8/31/2012    Chest pain     Degenerative disc disease, lumbar     DUB (dysfunctional uterine bleeding)     Foot pain, left     GERD (gastroesophageal reflux disease)     Headache(784.0)     migraine variant    Irregular heartbeat     Kidney calculus     Kidney stone     Menorrhagia     Pelvic pain in female     Postlaminectomy syndrome     S/P ankle ligament repair, left 2/7/2014    Stroke ([address] 1988, 1996, 1998    x3 - no residual    Tobacco abuse     Trichomonas     UTI (lower urinary tract infection)     Wears glasses 2007     Past Surgical History:   Procedure Laterality Date    HX LUMBAR FUSION  2012    L4-L5    HX ORTHOPAEDIC Left 02/07/2014    ligament reconstruction    HX TONSILLECTOMY      HX TUBAL LIGATION  1996    x2     Current Outpatient Medications   Medication Sig    magnesium oxide (MAG-OX) 400 mg tablet TAKE 1 TABLET BY MOUTH ONCE DAILY AS NEEDED    medroxyPROGESTERone (DEPO-PROVERA) 150 mg/mL syrg INJECT 1ML INTRAMUSCULARLY EVERY 90 DAYS    gabapentin (NEURONTIN) 600 mg tablet Take 1 Tab by mouth three (3) times daily. Max Daily Amount: 1,800 mg.    albuterol (PROVENTIL HFA, VENTOLIN HFA) 90 mcg/actuation inhaler Take 2 Puffs by inhalation every six (6) hours as needed.  fluticasone (FLOVENT HFA) 44 mcg/actuation inhaler Take 1 Puff by inhalation two (2) times a day.        Current Facility-Administered Medications   Medication Dose Route Frequency    triamcinolone acetonide (KENALOG-40) 40 mg/mL injection 20 mg  20 mg IntraMUSCular ONCE     Allergies   Allergen Reactions    Penicillins Rash     Social History     Occupational History    Not on file   Tobacco Use    Smoking status: Current Every Day Smoker     Packs/day: 0.00     Years: 33.00     Pack years: 0.00     Last attempt to quit: 2013     Years since quittin.4    Smokeless tobacco: Never Used   Substance and Sexual Activity    Alcohol use: No     Alcohol/week: 0.0 standard drinks    Drug use: No    Sexual activity: Yes     Partners: Male     Birth control/protection: Surgical     Family History   Problem Relation Age of Onset    Heart Disease Mother     Hypertension Mother     Stroke Mother     Hypertension Father     Hypertension Sister     Diabetes Sister         DIAGNOSTIC LAB DATA      No results found for: HBA1C, HGBE8, EQO0CNIC, RAM2JZSF //   Lab Results   Component Value Date/Time    Glucose 77 2019 07:58 PM        No results found for: YNH5RBAB, JFW3FGFA      No results found for: VITD3, XQVID2, XQVID3, XQVID, VD3RIA, LFKO60BWQPA      REVIEW OF SYSTEMS : 5/3/2021  ALL BELOW ARE Negative except : SEE HPI     All other systems reviewed and are negative. 12 point review of systems otherwise negative unless noted in HPI. DIAGNOSTIC IMAGING /ORDERS     MRI Results (most recent):  Results from Orders Only encounter on 21   MRI ANKLE LT WO CONT    Narrative EXAM: MRI ANKLE LT WO CONT    CLINICAL INDICATION/HISTORY: eval peroneal tendon and ligaments . Pain in the  anterior portion of the left ankle status post ankle ligament reconstruction in  . TECHNIQUE: Multisequence multiplanar MR imaging acquired through the Left ankle     COMPARISON: Left ankle MRI 2013. Left ankle radiographs 2015. FINDINGS:    Bones: Intact alignment. No acute fracture. New T2 hyperintense/T1 hypointense  lesion measuring 1.3 x 0.8 x 0.6 cm at the base of the third metatarsal. There  is some adjacent smaller foci with similar signal characteristics.  No  significant degenerative changes at the adjacent tarsal metatarsal joint. Suggestion of pes planus. Some subchondral edema within the anterior calcaneus  without acute fracture. Mild degenerative osteoarthropathy at the first  metatarsal head. . Subchondral edema at the posterior tibial plafond. Smooth  unremarkable talar dome. Medial flexor tendons: Mild tenosynovitis of the posterior tibial and flexor  digitorum longus tendons. Peroneal tendons: Unremarkable    Ventral extensor tendons: Mild tenosynovitis of the anterior tibialis tendon. Lateral ankle ligaments: Attenuated caliber of the anterior talofibular  ligament. Medial deltoid ligament complex: Unremarkable    Posterior ligaments: Unremarkable    Achilles tendon: Achilles tendon is intact. Mild retrocalcaneal bursitis. Plantar fascia: Unremarkable    Sinus tarsi: Unremarkable. Tarsal tunnel: Unremarkable      Impression :    1. New fibrocystic focus within the third metatarsal base possibly representing  a low flow vascular malformation, such as hemangioma some degeneration. 2. Attenuation of the anterior talofibular ligament possibly representing prior  injury. 3. Mild degenerative changes at the tibial plafond and subtalar joint. Thank you for this referral.         I have reviewed the results of the above study. The interpretation of this study is my professional opinion.                  PROCEDURE         [facility] PROCEDURE OUTPATIENT PROCEDURE    PROCEDURE:  Injection of the Left PEREONAL TENDON    Chart reviewed for the following:     I, Melinda Hunt MD, have reviewed the History, Physical and updated the Allergic reactions for [address] [facility] performed immediately prior to start of procedure:       * Patient was identified by name Marsha Diallo and date of birth   * Agreement on procedure being performed was verified  * Risks and Benefits explained to the patient: see below  * Procedure site verified and marked as necessary  * Patient was positioned for comfort  * Verbal Consent and Written Consent Verified by myself and my office staff. * Complications: None  *  All patient and/or family questions answered     The procedure was explained to the patient and possible adverse reactions were discussed. These risks included, but not limited to: bleeding, infection, septic arthritis, local skin irritation (skin discoloration, hyperpigmentation, hypopigmentation, thinning of the skin, development of a wound, skin necrosis), synovitis, subcutaneous fat pad atrophy, subcutaneous abscess, tendon rupture. Time: 9:23 AM  Date of procedure: 5/3/2021  Procedure performed by: Domingo Wilburn MD  Provider assisted by:  MA  Patient assisted by: self  How tolerated by patient: tolerated the procedure well with no complications  Comments: none    After verbal and informed consent, and after all patient and family questions answered, the procedure was performed as below: The Left LATERAL HINDFOOT area was prepped and cleansed with: sterile fashion using a follow solution: ALCOHOL/BETADINE STERILE PREP  . The injection was administered to the  Peroneal tendon sheath posterior lateral portion left hindfoot:  A solution of 20 mg of  KENALOG and 1 ml of LIDOCAINE 2% plain was used. The peroneal tendon sheath was injected easily without complications. The pain assessment score PRIOR/AFTER to the injection: 5 /10 //  too soon at determine due to this immediate injection  10 pain severity, mild intensity, aching Pain quality    Post injection instructions were given: removed band aid 3 hours, Wash site with clean soap, No further dressings there after. Call me if any: pain, redness, drainage, fever. Bo Hobbs tolerated the procedure well and was advised on the signs of infection and instructed to go to the ER or call the office if Lawence Aschoff becomes concerned about the area being infected.       Domingo Wilburn MD MD     An electronic signature was used to authenticate this note. Disclaimer: Sections of this note are dictated using utilizing voice recognition software, which may have resulted in some phonetic based errors in grammar and contents. Even though attempts were made to correct all the mistakes, some may have been missed, and remained in the body of the document. If questions arise, please contact our department. Mary Be, as dictated by Renetta Yi MD  5/3/2021  7:48 AM

## 2021-05-04 ENCOUNTER — HOSPITAL ENCOUNTER (OUTPATIENT)
Age: 56
Setting detail: OUTPATIENT SURGERY
Discharge: HOME | End: 2021-05-04
Attending: PHYSICAL MEDICINE & REHABILITATION | Admitting: PHYSICAL MEDICINE & REHABILITATION
Payer: MEDICAID

## 2021-05-04 ENCOUNTER — APPOINTMENT (OUTPATIENT)
Dept: GENERAL RADIOLOGY | Age: 56
End: 2021-05-04
Attending: PHYSICAL MEDICINE & REHABILITATION
Payer: MEDICAID

## 2021-05-04 VITALS
DIASTOLIC BLOOD PRESSURE: 80 MMHG | TEMPERATURE: 98.3 F | RESPIRATION RATE: 20 BRPM | HEART RATE: 92 BPM | OXYGEN SATURATION: 96 % | SYSTOLIC BLOOD PRESSURE: 141 MMHG

## 2021-05-04 PROCEDURE — 76010000009 HC PAIN MGT 0 TO 30 MIN PROC: Performed by: PHYSICAL MEDICINE & REHABILITATION

## 2021-05-04 PROCEDURE — 64483 NJX AA&/STRD TFRM EPI L/S 1: CPT | Performed by: PHYSICAL MEDICINE & REHABILITATION

## 2021-05-04 PROCEDURE — 74011000636 HC RX REV CODE- 636: Performed by: PHYSICAL MEDICINE & REHABILITATION

## 2021-05-04 PROCEDURE — 74011000250 HC RX REV CODE- 250: Performed by: PHYSICAL MEDICINE & REHABILITATION

## 2021-05-04 PROCEDURE — 77030039433 HC TY MYLEOGRAM BD -B: Performed by: PHYSICAL MEDICINE & REHABILITATION

## 2021-05-04 PROCEDURE — 77030003669 HC NDL SPN COOK -B: Performed by: PHYSICAL MEDICINE & REHABILITATION

## 2021-05-04 PROCEDURE — 2709999900 HC NON-CHARGEABLE SUPPLY: Performed by: PHYSICAL MEDICINE & REHABILITATION

## 2021-05-04 PROCEDURE — 74011250636 HC RX REV CODE- 250/636: Performed by: PHYSICAL MEDICINE & REHABILITATION

## 2021-05-04 PROCEDURE — 74011250637 HC RX REV CODE- 250/637: Performed by: PHYSICAL MEDICINE & REHABILITATION

## 2021-05-04 RX ORDER — DEXAMETHASONE SODIUM PHOSPHATE 100 MG/10ML
INJECTION INTRAMUSCULAR; INTRAVENOUS AS NEEDED
Status: DISCONTINUED | OUTPATIENT
Start: 2021-05-04 | End: 2021-05-04 | Stop reason: HOSPADM

## 2021-05-04 RX ORDER — DIAZEPAM 5 MG/1
5-20 TABLET ORAL ONCE
Status: COMPLETED | OUTPATIENT
Start: 2021-05-04 | End: 2021-05-04

## 2021-05-04 RX ORDER — LIDOCAINE HYDROCHLORIDE 10 MG/ML
INJECTION, SOLUTION EPIDURAL; INFILTRATION; INTRACAUDAL; PERINEURAL AS NEEDED
Status: DISCONTINUED | OUTPATIENT
Start: 2021-05-04 | End: 2021-05-04 | Stop reason: HOSPADM

## 2021-05-04 RX ADMIN — DIAZEPAM 10 MG: 5 TABLET ORAL at 07:34

## 2021-05-04 NOTE — DISCHARGE INSTRUCTIONS
[facility]   ([facility])  Dr. Leatha Trinidad, Dr. Juhi Wray, Dr. Thuan Rosa Spinal Procedure (Block) Instructions    * Do not drive a car, operate heavy machinery or dangerous equipment, or make important decisions for 12-24 hours. * Light activity as tolerated; may rest for the remainder of the day. * Resume pre-block medications including those from your other doctors. * Do not drink alcoholic beverages for 24 hours. Alcohol and the medications you have received may interact and cause an adverse reaction. * You may feel better this evening and worse tomorrow, as the numbing medications wears off and the steroid has yet to begin to work. After 48-72 hrs the steroid should begin to release bringing you relief. If you had a medial branch block, no steroids were used. The medial branch block is a test to see if you are a candidate for radiofrequency ablation (RFA). The anesthetic (numbing medicine)  will wear off by the next day. * You may shower this evening and remove any bandages. * Avoid hot tubs/pools/tub soaks and heating pads for 24 hours. You may use cold packs on the procedure site as tolerated for the first 24 hours. * If a headache develops, drink plenty of fluids and rest.  Take over the counter medications for headache if needed. If the headache continues longer than 24 hours, call MD at the [address] [place] [address] Avenue. [phone]    * Continue taking pain medications as needed. * You may resume your regular diet if tolerated. Otherwise, start with sips of water and advance slowly. * If Diabetic: check your blood sugar three times a day for the next 3 days. If your sugar is greater than 300 call your family doctor. If your sugar is greater than 400, have someone transport you to the nearest Emergency Room. * If you experience any of the following problems, Please Call the [address] [place] [address] Avenue at [phone].         * Excessive pain, swelling, redness or odor at or around the surgical area    * Fever of 101 or higher    * Nausea / Vomiting lasting longer than 4 hours or if unable to take medications. * Severe Headache    * Weakness or numbness in arms or legs that is not      resolving   * Any NEW signs of decreased circulation or nerve impairment in leg: change in color, swelling, persistent numbness, tingling                    * Prolonged increase in pain greater than 4 days      PATIENT INSTRUCTIONS:    After oral sedation, for 12-24 hours or while taking prescription Narcotics:  · Limit your activities  · Do not drive and operate hazardous machinery  · Do not make important personal or business decisions  · Do  not drink alcoholic beverages  · If you have not urinated within 8 hours after discharge, please contact your surgeon on call. *  Please give a list of your current medications to your Primary Care Provider. *  Please update this list whenever your medications are discontinued, doses are      changed, or new medications (including over-the-counter products) are added. *  Please carry medication information at all times in case of emergency situations. These are general instructions for a healthy lifestyle:    No smoking/ No tobacco products/ Avoid exposure to second hand smoke    Surgeon General's Warning:  Quitting smoking now greatly reduces serious risk to your health. Obesity, smoking, and sedentary lifestyle greatly increases your risk for illness    A healthy diet, regular physical exercise & weight monitoring are important for maintaining a healthy lifestyle    You may be retaining fluid if you have a history of heart failure or if you experience any of the following symptoms:  Weight gain of 3 pounds or more overnight or 5 pounds in a week, increased swelling in our hands or feet or shortness of breath while lying flat in bed.   Please call your doctor as soon as you notice any of these symptoms; do not wait until your next office visit. Recognize signs and symptoms of STROKE:    F-face looks uneven    A-arms unable to move or move unevenly    S-speech slurred or non-existent    T-time-call 911 as soon as signs and symptoms begin-DO NOT go       Back to bed or wait to see if you get better-TIME IS BRAIN.

## 2021-05-04 NOTE — INTERVAL H&P NOTE
Update History & Physical    The Patient's History and Physical of April 5, 2021 was reviewed. There was no change. The surgical site was confirmed by the patient and me. Plan:  The risk, benefits, expected outcome, and alternative to the recommended procedure have been discussed with the patient. Patient understands and wants to proceed with the procedure.     Electronically signed by Ivy Schmidt MD on 5/4/2021 at 8:15 AM

## 2021-05-04 NOTE — PROCEDURES
SELECTIVE NERVE ROOT BLOCK PROCEDURE NOTE      Patient Name: Kane Room  Date of Procedure: May 4, 2021  Preoperative Diagnosis:  Lumbar radiculopathy [M54.16]  Post Operative Diagnosis:  Lumbar radiculopathy [M54.16]  Location:  [facility], Special Procedures Unit, [address] [place] [address]    Procedure :    left L4 Selective Nerve Root Block      Consent:  Informed consent was obtained prior to the procedure. The patient was given the opportunity to ask questions regarding the procedure and its associated risks. In addition to the potential risks associated with the procedure itself, the patient was informed both verbally and in writing of the potential side effects of the use of glucocorticoid. The patient appeared to comprehend the informed consent and desired to have the procedure performed. The patient was counseled at length about the risks of louisa Covid-19 during their perioperative period and any recovery window from their procedure. The patient was made aware that louisa Covid-19  may worsen their prognosis for recovering from their procedure and lend to a higher morbidity and/or mortality risk. All material risks, benefits, and reasonable alternatives including postponing the procedure were discussed. The patient does  wish to proceed with the procedure at this time. Procedure: The patient was placed in the prone position on the fluoroscopy table and the back was prepped and draped in the usual sterile manner. The exact spinal level was  identified using fluoroscopy, and Lidocaine 1 % was injected locally, a # 22 gauge spinal needle was passed to the transverse process. The depth was noted and the needle redirected to pass inferior and approximately one cm anterior to the transverse process. YES  1 cc of Isovue M-200 was used to verify positioning in the epidural and paravertebral space and outlined the course of the spinal nerve into the epidural space. The same procedure was repeated at each spinal level indicated above. No vascular uptake was identified. A total of 10 mg of preservative free Dexamethasone and 1 cc of Lidocaine/site was slowly injected. The patient tolerated the procedure well. The injection area was cleaned and bandaids applied. Not excessive bleeding was noted. Patient dressed and discharged to home with instructions. Discussion: The patient tolerated the procedure well.                                               Geetha Abbasi MD  May 4, 2021

## 2021-05-11 ENCOUNTER — TELEPHONE (OUTPATIENT)
Dept: ORTHOPEDIC SURGERY | Age: 56
End: 2021-05-11

## 2021-05-11 DIAGNOSIS — M54.16 LUMBAR RADICULOPATHY: ICD-10-CM

## 2021-05-11 DIAGNOSIS — Z98.1 S/P LUMBAR FUSION: ICD-10-CM

## 2021-05-11 RX ORDER — GABAPENTIN 600 MG/1
600 TABLET ORAL 3 TIMES DAILY
Qty: 90 TAB | Refills: 0 | Status: SHIPPED | OUTPATIENT
Start: 2021-05-11 | End: 2021-06-10

## 2021-05-11 NOTE — TELEPHONE ENCOUNTER
Spoke to patient, ran out of her gabapentin 600mg TID, thinks she may have lost some of the pills. Recently had lumbar MALKA 5/4/21 and will f/u next week . Will renew rx PDMP checked last prescribed 3/15/21 for 90 days.   Will give one month supply

## 2021-05-11 NOTE — TELEPHONE ENCOUNTER
I called and spoke to the pt. The pt was identified using 2 pt identifiers. She was asked if she has been taking more than 3 a day of the gabapentin. The pt states that she has been because of her pain level. She reports that she only has 2 tabs left in the bottle. I informed Ms. Karime Rivera that the medication she received on 03/15/2021 should be enough for another 30 days. Ms. Karime Rivera also admits to taking some old 800 mg tabs. She reports that none of the medication is helping the pain. Even with the increase. Please advise.

## 2021-05-11 NOTE — TELEPHONE ENCOUNTER
Patient is requesting a refill for Gabapentin to be sent to [facility]. She is out of this medication.      Patient [phone]

## 2021-05-11 NOTE — TELEPHONE ENCOUNTER
03/15/2021  2   03/10/2021  Gabapentin 600 MG Tablet  270.00  [address]   [id number]   [place] ([id number])   0   [place]   [place]       According to the , the pt received #270 tabs on 3/15/21. She should not be out yet. She is suppose to take it tid. Is she taking more than that?

## 2021-05-25 ENCOUNTER — OFFICE VISIT (OUTPATIENT)
Dept: ORTHOPEDIC SURGERY | Age: 56
End: 2021-05-25
Payer: MEDICAID

## 2021-05-25 VITALS
HEART RATE: 94 BPM | HEIGHT: 61 IN | WEIGHT: 134 LBS | BODY MASS INDEX: 25.3 KG/M2 | TEMPERATURE: 98.2 F | OXYGEN SATURATION: 97 %

## 2021-05-25 DIAGNOSIS — M76.62 ACHILLES TENDINITIS OF LEFT LOWER EXTREMITY: Primary | ICD-10-CM

## 2021-05-25 PROCEDURE — 99213 OFFICE O/P EST LOW 20 MIN: CPT | Performed by: ORTHOPAEDIC SURGERY

## 2021-05-25 NOTE — PROGRESS NOTES
AMBULATORY PROGRESS NOTE      Patient: Juan Francisco Carlton             MRN: [id number]     SSN: [id number] Body mass index is 25.74 kg/m². YOB: 1965     AGE: 61 y.o. EX: female    PCP: Carol Isabel MD       IMPRESSION //  DIAGNOSIS AND TREATMENT PLAN        Juan Francisco Carlton has a diagnosis of:      DIAGNOSES    No diagnosis found. No orders of the defined types were placed in this encounter. PLAN:    1. I will provide a Thera-band for HEP to strengthen left ankle. RTO- PRN    Juan Francisco Carlton  expresses understanding of the diagnosis, treatment plan, and all of their proposed questions were answered to their satisfaction. Patient education has been provided re the diagnoses. HPI //  [address] IS A 61 y.o. female who is a/an  established patient, presenting to my outpatient office for evaluation of  the following chief complaint(s):     Chief Complaint   Patient presents with    Ankle Pain     left ankle     In the past patient presented with chief compliant of anterolateral left ankle pain.  She was taking gabapentin, for neuropathy, and was under the care of a spine team, for what sounded like a discogenic radiculopathy causing radiculopathy.      She had a left ankle ligament reconstruction, 2014, modified Broström type procedure.  She working at [employer] as a , but missed work for the last few days. Pasqual Clutter complaint, anterolateral left ankle pain and she felt some instability is what she described. A MRI of the left ankle to assess anterior ligaments and anterolateral talar dome was recommended.  I provided a work note to keep her out of work for the next 2 weeks will be written for her reassess her, after her ankle MRI     She had x-rays of her left ankle, 3 views, today, AP lateral bleak, shows normal alignment no fracture no solution of dislocation. Yariel Estephanie is a slight lucency seen the distal fibula, this is where she may had a [employer] suture anchor, in place of the distal fibula.     At [address] [place] [address] patient reported continued left ankle pain that radiated up to her left calf that was constant every day. She stated her left ankle pain was alleviated with elevation. I adminstered a cortisine injection in her left peroneal tendon. I wrote a DME order: left AS/AC brace. I anticipated providing a referral to PT if pain does not subside.      Pt mentioned she has had a Cortisone injection before her left ankle surgery in 2014 that provided much relief and would like one.      Pt noted she will be getting a shot in her lumbar tomorrow. At today OV pt states she has improved left ankle pain aside from soreness in her left ankle. She has been compliant with left AS/AC brace. She notes she is returning back to work at [employer], next week. Visit Vitals  Pulse 94   Temp 98.2 °F (36.8 °C) (Temporal)   Ht 5' 0.5\" (1.537 m)   Wt 134 lb (60.8 kg)   LMP 04/26/2015   SpO2 97%   BMI 25.74 kg/m²       Appearance: Alert, well appearing and pleasant patient who is in no distress, oriented to person, place/time, and who follows commands. This patient is accompanied in the examination room by her  self. There is signs of: no dementia  Psychiatric: Affect/mood are appropriate. Speech normal in context and clarity, memory intact grossly, no involuntary movements - tremors. Patient arrives to office via: with assistive device: left AS/AC brace  H EENT (2): Head normocephalic & atraumatic. Eye: pupils are round// EOM are intact // Neck: ROM WNL  // Hearings Intact   Respiratory: Breathing non labored     ANKLE/FOOT left    Gait: uses assistive device   Tenderness: non tender to her left ankle   Cutaneous:  WNL. Joint Motion: WNL,  Joint / Tendon Stability: No Ankle or Subtalar instability or joint laxity.                        No peroneal sublux ability or dislocation, 1+ slight grind test  Alignment: neutral Hindfoot,    Neuro Motor/Sensory: NL/NL  Vascular: NL foot/ankle pulses,   Lymphatics: No extremity lymphedema, No calf swelling, no tenderness to calf muscles. CHART REVIEW     Moises Chanel has been experiencing pain and discomfort confirmed as outlined in the pain assessment outlined below.  was reviewed by Iraida Barajas MD on 5/25/2021. Pain Assessment  5/25/2021   Location of Pain Ankle   Location Modifiers Left   Severity of Pain 2   Quality of Pain Dull;Popping   Quality of Pain Comment -   Duration of Pain A few minutes   Frequency of Pain Several times daily   Date Pain First Started -   Aggravating Factors Other (Comment)   Aggravating Factors Comment not wearing ankle brace   Limiting Behavior No   Relieving Factors Exercises; Rest;Elevation   Relieving Factors Comment -   Result of Injury No   Work-Related Injury -   Type of Injury -   Type of Injury Comment -        Moises Chanel  has a past medical history of Appetite loss (2013), Arthritis, Asthma (2006), Back pain (8/31/2012), Chest pain, Degenerative disc disease, lumbar, DUB (dysfunctional uterine bleeding), Foot pain, left, GERD (gastroesophageal reflux disease), Headache(784.0), Irregular heartbeat, Kidney calculus, Kidney stone, Menorrhagia, Pelvic pain in female, Postlaminectomy syndrome, S/P ankle ligament repair, left (2/7/2014), [facility] (1988, 1996, 1998), Tobacco abuse, Trichomonas, UTI (lower urinary tract infection), and Wears glasses (2007).      Patients is employed at:         Past Medical History:   Diagnosis Date    Appetite loss 2013    Arthritis     in back    Asthma 2006    Back pain 8/31/2012    Chest pain     Degenerative disc disease, lumbar     DUB (dysfunctional uterine bleeding)     Foot pain, left     GERD (gastroesophageal reflux disease)     Headache(784.0)     migraine variant    Irregular heartbeat     Kidney calculus     Kidney stone     Menorrhagia     Pelvic pain in female     Postlaminectomy syndrome     S/P ankle ligament repair, left 2014    Stroke ([address] , , 1998    x3 - no residual    Tobacco abuse     Trichomonas     UTI (lower urinary tract infection)     Wears glasses      Past Surgical History:   Procedure Laterality Date    HX LUMBAR FUSION  2012    L4-L5    HX ORTHOPAEDIC Left 2014    ligament reconstruction    HX TONSILLECTOMY      HX TUBAL LIGATION  1996    x2     Current Outpatient Medications   Medication Sig    medroxyPROGESTERone (DEPO-PROVERA) 150 mg/mL syrg INJECT 1ML INTRAMUSCULARLY EVERY 90 DAYS    albuterol (PROVENTIL HFA, VENTOLIN HFA) 90 mcg/actuation inhaler Take 2 Puffs by inhalation every six (6) hours as needed.  fluticasone (FLOVENT HFA) 44 mcg/actuation inhaler Take 1 Puff by inhalation two (2) times a day.  gabapentin (NEURONTIN) 600 mg tablet Take 1 Tab by mouth three (3) times daily for 30 days. Max Daily Amount: 1,800 mg. (Patient not taking: Reported on 2021)    magnesium oxide (MAG-OX) 400 mg tablet TAKE 1 TABLET BY MOUTH ONCE DAILY AS NEEDED (Patient not taking: Reported on 2021)     No current facility-administered medications for this visit.      Allergies   Allergen Reactions    Penicillins Rash     Social History     Occupational History    Not on file   Tobacco Use    Smoking status: Current Every Day Smoker     Packs/day: 0.00     Years: 33.00     Pack years: 0.00     Last attempt to quit: 2013     Years since quittin.4    Smokeless tobacco: Never Used   Substance and Sexual Activity    Alcohol use: No     Alcohol/week: 0.0 standard drinks    Drug use: No    Sexual activity: Yes     Partners: Male     Birth control/protection: Surgical     Family History   Problem Relation Age of Onset    Heart Disease Mother     Hypertension Mother     Stroke Mother     Hypertension Father     Hypertension Sister     Diabetes Sister         DIAGNOSTIC LAB DATA      No results found for: HBA1C, AHO3CVGR, NCG8AVZP //   Lab Results   Component Value Date/Time    Glucose 77 07/26/2019 07:58 PM        No results found for: UID1CGVR, CAG9PDNZ      No results found for: VITD3, XQVID2, XQVID3, XQVID, VD3RIA, RYOQ28HWRGK      REVIEW OF SYSTEMS : 5/25/2021  ALL BELOW ARE Negative except : SEE HPI     All other systems reviewed and are negative. 12 point review of systems otherwise negative unless noted in HPI. DIAGNOSTIC IMAGING /ORDERS      NONE TODAY      I have reviewed the results of the above study. The interpretation of this study is my professional opinion. An electronic signature was used to authenticate this note. Disclaimer: Sections of this note are dictated using utilizing voice recognition software, which may have resulted in some phonetic based errors in grammar and contents. Even though attempts were made to correct all the mistakes, some may have been missed, and remained in the body of the document. If questions arise, please contact our department. Priya Guzman may have a reminder for a \"due or due soon\" health maintenance. I have asked that she contact her primary care provider for follow-up on this health maintenance. Graham Holder, as dictated by Cherrie Yi MD  5/25/2021  8:27 AM

## 2021-05-25 NOTE — PATIENT INSTRUCTIONS
Ankle: Exercises  Introduction  Here are some examples of exercises for you to try. The exercises may be suggested for a condition or for rehabilitation. Start each exercise slowly. Ease off the exercises if you start to have pain. You will be told when to start these exercises and which ones will work best for you. How to do the exercises  'Alphabet' exercise   1. Trace the alphabet with your toe. This helps your ankle move in all directions. Side-to-side knee swing exercise   1. Sit in a chair with your foot flat on the floor. 2. Slowly move your knee from side to side while keeping your foot pressed flat. 3. Continue this exercise for 2 to 3 minutes. Towel curl   1. While sitting, place your foot on a towel on the floor and scrunch the towel toward you with your toes. 2. Then use your toes to push the towel away from you. 3. Make this exercise more challenging by placing a weighted object, such as a soup can, on the other end of the towel. Towel stretch   1. Sit with your legs extended and knees straight. 2. Place a towel around your foot just under the toes. 3. Hold each end of the towel in each hand, with your hands above your knees. 4. Pull back with the towel so that your foot stretches toward you. 5. Hold the position for at least 15 to 30 seconds. 6. Repeat 2 to 4 times a session, up to 5 sessions a day. Ankle eversion exercise   1. Start by sitting with your foot flat on the floor and pushing it outward against an immovable object such as the wall or heavy furniture. Hold for about 6 seconds, then relax. Repeat 8 to 12 times. 2. After you feel comfortable with this, try using rubber tubing looped around the outside of your feet for resistance. Push your foot out to the side against the tubing, and then count to 10 as you slowly bring your foot back to the middle. Repeat 8 to 12 times. Isometric opposition exercises   1.  While sitting, put your feet together flat on the floor.  2. Press your injured foot inward against your other foot. Hold for about 6 seconds, and relax. Repeat 8 to 12 times. 3. Then place the heel of your other foot on top of the injured one. Push down with the top heel while trying to push up with your injured foot. Hold for about 6 seconds, and relax. Repeat 8 to 12 times. Follow-up care is a key part of your treatment and safety. Be sure to make and go to all appointments, and call your doctor if you are having problems. It's also a good idea to know your test results and keep a list of the medicines you take. Where can you learn more? Go to http://www.gray.com/  Enter R730 in the search box to learn more about \"Ankle: Exercises. \"  Current as of: November 16, 2020               Content Version: 12.8  © 0773-0781 [employer]. Care instructions adapted under license by [employer] (which disclaims liability or warranty for this information). If you have questions about a medical condition or this instruction, always ask your healthcare professional. [address] any warranty or liability for your use of this information.

## 2021-05-26 ENCOUNTER — TELEPHONE (OUTPATIENT)
Dept: ORTHOPEDIC SURGERY | Age: 56
End: 2021-05-26

## 2021-05-26 NOTE — TELEPHONE ENCOUNTER
Patient was in office on 5/25; patient called in requesting a letter to return to work full duty with no restrictions. She stated she needs the documentation ready before Tuesday 6/1.     Please advise patient at [phone]

## 2021-05-26 NOTE — TELEPHONE ENCOUNTER
Please provide work note as requested.     Dela Sicard, PA-C, [facility]  5/26/2021   9:40 AM

## 2021-05-26 NOTE — TELEPHONE ENCOUNTER
I think this is a patient  saw for MRI follow up. I dont think she followed up after her recent MALKA. Can she schedule a video visit with one of us to see how she is doing and we can make sure she is fine to return to work without restriction?

## 2021-05-26 NOTE — TELEPHONE ENCOUNTER
Patient called requesting a work note stating she's able to return to work full duty with no restrictions. She's asking for this note to be completed before Tuesday 06/01/21. She also requested this from Dr. Mana Machuca but says she needs one from Dr. Leroy Pablo as well. Please advise patient back regarding this at [phone].

## 2021-05-26 NOTE — LETTER
NOTIFICATION RETURN TO WORK / SCHOOL 
 
5/26/2021 10:40 AM 
 
[facility] [address] [place] [phone] To Whom It May Concern: 
 
Jewell Verma is currently under the care of [address] [place], [place] [address]fernando Chaudhari. She may return to full duty work with no restrictions. If there are questions or concerns please have the patient contact our office.  
 
 
 
Sincerely, 
 
 
Justyn Rodriguez MD

## 2021-05-27 NOTE — TELEPHONE ENCOUNTER
Pt came by mast one to req rtw note an example of what she needs is scanned into cc from dr Domingo Sevilla.   Please advise pt needs by 6/1/21 (left side back)

## 2021-05-28 ENCOUNTER — TELEPHONE (OUTPATIENT)
Dept: ORTHOPEDIC SURGERY | Age: 56
End: 2021-05-28

## 2021-05-28 NOTE — TELEPHONE ENCOUNTER
I called and spoke to the pt. The pt was identified using 2 pt identifiers. She is ok with the below response. She saw Dr. Sarah Potts recently and got a letter from his office for her needs there. She just needs a letter from our office with the below info about being able to return to work. Pt is going back to work on 06/01/2021 and would like to get the letter today. Please advise.

## 2021-05-28 NOTE — TELEPHONE ENCOUNTER
Per a previous note, Work Cleared for full duty on spine stand point, will keep 20lb wgt limit until she is eval by ortho for shoulder pain, future restrictions from ortho

## 2021-05-28 NOTE — TELEPHONE ENCOUNTER
She can be offered a visit with an NP to discuss how the injection worked and discuss work restrictions if the below is not satisfactory

## 2021-05-28 NOTE — TELEPHONE ENCOUNTER
Pt was contacted and notified that the letter has been completed and is ready for . She verbalized understanding and will come by the office today to pick it up. Pt aware that she will need to be at the office no later than 4 pm today for . No questions or concerns voiced at this time.

## 2021-05-28 NOTE — TELEPHONE ENCOUNTER
Letter updated, stats patient is clear from a spine standpoint. Any further changes in a letter, would require an apt.

## 2021-05-28 NOTE — TELEPHONE ENCOUNTER
Patient called again and said that the Return to Full letter that was written today 5/28/21 is incorrect. That it states the Shoulder, but that it should state her Back not the shoulder. Patient is asking that the letter be fixed and faxed to:  fax # [phone]. Patient tel. [phone].

## 2021-05-28 NOTE — TELEPHONE ENCOUNTER
The letter was faxed to the number provided by the pt. A fax confirmation was received. Pt aware letter was faxed.

## 2021-05-28 NOTE — TELEPHONE ENCOUNTER
Patient states the letter written today needs to be changed. She states she has never been seen for shoulder pain. Also, the last four of her social is incorrect. It should be [phone]. Please change and notify patient.  [phone]

## 2021-07-20 NOTE — PROGRESS NOTES
[address]  [address], [address]  [facility], [address]  Phone: [phone]  Fax: [phone]    Janel Mcconnell  : 1965  PCP: Raphael Quan MD    PROGRESS NOTE    HISTORY OF PRESENT ILLNESS:  Chief Complaint   Patient presents with    Back Pain    Leg Pain     left     Kati Mercado is a 61 y.o.  female with history of back pain. She saw Dr. Joshua Akhtar last for an MRI FU. She was to continue PT, gabapentin, and a L4/5 TF MALKA and Left L4 SNRB was ordered. A facet injection was discussed should this not provide relief. Today, she states the last injection was very helpful. It lasted about 8 weeks 70%. She just started having pain again. Her pain has just started to hurt again. She is having some left anterior thigh pain as well that started over the weekend. This is new. She is taking gabapentin 800 mg TID. She would like to avoid surgery if possible. Denies bladder/bowel dysfunction, saddle paresthesia, weakness, gait disturbance, or other neurological deficit. Pt at this time desires to  continue with current care/proceed with medication evaluation/proceed with block eval.      MRI lumbar spine 3/30/21  L4-5: There is acquired central canal stenosis thecal sac now measures about 1.0  cm versus 1.3 cm previously there is now a diffuse annular bulge mild to  moderate with associated ligamentous hypertrophy not seen previously  The left neuroforamina demonstrates mild right and moderate stenosis. There maybe intermittent compression to the right L4 nerve root seen on sagittal image 10 series 2  L5-S1: L5-S1 has undergone remote anterior fusion cage appears well incorporated with the disc space the central canal is intact normal    ASSESSMENT  61 y.o. female with back pain. Diagnoses and all orders for this visit:    1. S/P lumbar fusion  -     gabapentin (NEURONTIN) 600 mg tablet; Take 1 Tablet by mouth three (3) times daily.  Max Daily Amount: 1,800 mg.  - DULoxetine (CYMBALTA) 30 mg capsule; Take 1 Capsule by mouth daily. Indications: chronic muscle or bone pain, neuropathic pain    2. Lumbar radiculopathy  -     gabapentin (NEURONTIN) 600 mg tablet; Take 1 Tablet by mouth three (3) times daily. Max Daily Amount: 1,800 mg.  -     DULoxetine (CYMBALTA) 30 mg capsule; Take 1 Capsule by mouth daily. Indications: chronic muscle or bone pain, neuropathic pain    3. Spinal stenosis at L4-L5 level  -     gabapentin (NEURONTIN) 600 mg tablet; Take 1 Tablet by mouth three (3) times daily. Max Daily Amount: 1,800 mg.  -     DULoxetine (CYMBALTA) 30 mg capsule; Take 1 Capsule by mouth daily. Indications: chronic muscle or bone pain, neuropathic pain    4. Lumbar pain  -     gabapentin (NEURONTIN) 600 mg tablet; Take 1 Tablet by mouth three (3) times daily. Max Daily Amount: 1,800 mg.  -     DULoxetine (CYMBALTA) 30 mg capsule; Take 1 Capsule by mouth daily. Indications: chronic muscle or bone pain, neuropathic pain    Other orders  -     methylPREDNISolone (Medrol, Jose Miguel,) 4 mg tablet; Per dose pack instructions         IMPRESSION/PLAN    1) Pt was given information on back exercises. 2) cont gabapentin  3) trial of Cymbalta  4) MDP for flare of thigh pain- no NSAIDS while on this  5) discussed a repeat injection when needed, ideally we space this out as she can have 3 in 12 months   6) Ms. Dianna Hdz has a reminder for a \"due or due soon\" health maintenance. I have asked that she contact her primary care provider, Other, MD Carol, for follow-up on this health maintenance. 7) We have informed patient to notify us for immediate appointment if he has any worsening neurogical symptoms or if an emergency situation presents, then call 911  8) Pt will follow-up in 6 weeks for med fu. Risks and benefits of ongoing  therapy have been reviewed with the patient.  is appropriate. No pain behaviors. Denies thoughts of harming self or others.  Pt has a good risk to benefit ratio which allows the pt to function in a home environment without side effects. PAST MEDICAL HISTORY  Past Medical History:   Diagnosis Date    Appetite loss 2013    Arthritis     in back    Asthma 2006    Back pain 8/31/2012    Chest pain     Degenerative disc disease, lumbar     DUB (dysfunctional uterine bleeding)     Foot pain, left     GERD (gastroesophageal reflux disease)     Headache(784.0)     migraine variant    Irregular heartbeat     Kidney calculus     Kidney stone     Menorrhagia     Pelvic pain in female     Postlaminectomy syndrome     S/P ankle ligament repair, left 2/7/2014    Stroke ([facility] [address] 1988, 1996, 1998    x3 - no residual    Tobacco abuse     Trichomonas     UTI (lower urinary tract infection)     Wears glasses 2007        MEDICATIONS  Current Outpatient Medications   Medication Sig Dispense Refill    Advair Diskus 250-50 mcg/dose diskus inhaler INHALE 1 PUFF BY MOUTH TWICE DAILY      omeprazole (PRILOSEC) 20 mg capsule TAKE 1 CAPSULE BY MOUTH ONCE DAILY 30 MINUTES BEFORE MORNING MEAL      propranolol LA (INDERAL LA) 80 mg SR capsule TAKE 1 CAPSULE BY MOUTH ONCE DAILY FOR MIGRAINE      gabapentin (NEURONTIN) 600 mg tablet Take 1 Tablet by mouth three (3) times daily. Max Daily Amount: 1,800 mg. 270 Tablet 1    DULoxetine (CYMBALTA) 30 mg capsule Take 1 Capsule by mouth daily. Indications: chronic muscle or bone pain, neuropathic pain 30 Capsule 1    methylPREDNISolone (Medrol, Jose Miguel,) 4 mg tablet Per dose pack instructions 1 Dose Pack 0    magnesium oxide (MAG-OX) 400 mg tablet TAKE 1 TABLET BY MOUTH ONCE DAILY AS NEEDED      medroxyPROGESTERone (DEPO-PROVERA) 150 mg/mL syrg INJECT 1ML INTRAMUSCULARLY EVERY 90 DAYS      albuterol (PROVENTIL HFA, VENTOLIN HFA) 90 mcg/actuation inhaler Take 2 Puffs by inhalation every six (6) hours as needed.  fluticasone (FLOVENT HFA) 44 mcg/actuation inhaler Take 1 Puff by inhalation two (2) times a day. ALLERGIES  Allergies   Allergen Reactions    Meloxicam Itching    Nortriptyline Other (comments)    Penicillins Rash       SOCIAL HISTORY    Social History     Socioeconomic History    Marital status:      Spouse name: Not on file    Number of children: Not on file    Years of education: Not on file    Highest education level: Not on file   Occupational History    Not on file   Tobacco Use    Smoking status: Current Every Day Smoker     Packs/day: 0.00     Years: 33.00     Pack years: 0.00     Last attempt to quit: 2013     Years since quittin.6    Smokeless tobacco: Never Used   Substance and Sexual Activity    Alcohol use: No     Alcohol/week: 0.0 standard drinks    Drug use: No    Sexual activity: Yes     Partners: Male     Birth control/protection: Surgical   Other Topics Concern    Not on file   Social History Narrative    Not on file     Social Determinants of Health     Financial Resource Strain:     Difficulty of Paying Living Expenses:    Food Insecurity:     Worried About Running Out of Food in the Last Year:     Ran Out of Food in the Last Year:    Transportation Needs:     Lack of Transportation (Medical):      Lack of Transportation (Non-Medical):    Physical Activity:     Days of Exercise per Week:     Minutes of Exercise per Session:    Stress:     Feeling of Stress :    Social Connections:     Frequency of Communication with Friends and Family:     Frequency of Social Gatherings with Friends and Family:     Attends [employer] Services:     Active Member of Clubs or Organizations:     Attends Club or Organization Meetings:     Marital Status:    Intimate Partner Violence:     Fear of Current or Ex-Partner:     Emotionally Abused:     Physically Abused:     Sexually Abused:        SUBJECTIVE        Pain Scale: 8/10    Pain Assessment  2021   Location of Pain Back;Leg   Location Modifiers Left   Severity of Pain 8   Quality of Pain Dull Quality of Pain Comment -   Duration of Pain Persistent   Frequency of Pain Intermittent   Date Pain First Started -   Aggravating Factors Other (Comment)   Aggravating Factors Comment nothing in particular   Limiting Behavior Some   Relieving Factors Nothing   Relieving Factors Comment -   Result of Injury -   Work-Related Injury -   Type of Injury -   Type of Injury Comment -       Accompanied by self. REVIEW OF SYSTEMS  ROS    Constitutional: Negative for fever, chills, or weight change. Respiratory: Negative for cough or shortness of breath. Cardiovascular: Negative for chest pain or palpitations. Gastrointestinal: Negative for acid reflux, change in bowel habits, or constipation. Genitourinary: Negative for incontinence, dysuria and flank pain. Musculoskeletal: Positive for back pain. Skin: Negative for rash. Neurological: Negative for headaches, dizziness, or numbness. Endo/Heme/Allergies: Negative . Psychiatric/Behavioral: Negative. PHYSICAL EXAMINATION  Visit Vitals  Pulse 94   Temp 97.5 °F (36.4 °C) (Skin)   Ht 5' 0.05\" (1.525 m)   Wt 141 lb (64 kg)   LMP 04/26/2015   SpO2 98% Comment: RA   BMI 27.49 kg/m²       Constitutional: Well developed,  well nourished,  awake, alert, and in no acute distress. Neurological:  Sensation to light touch is intact. Psychiatric: Affect and mood are appropriate. Integumentary: No rashes or abrasions noted on exposed areas,  warm, dry and intact. Cardiovascular/Peripheral Vascular:  No peripheral edema is noted. Lymphatic:  No evidence of lymphedema. No cervical lymphadenopathy. SPINE/MUSCULOSKELETAL EXAM    Lumbar spine:  No rash, ecchymosis, or gross obliquity. No fasciculations. No focal atrophy is noted. Range of motion is intact. Tenderness to palpation to low back. SI joints non-tender. Trochanters non tender.       Musculoskeletal:  No pain with extension, axial loading, or forward flexion.  No pain with internal or external rotation of her hips.      MOTOR       Hip Flex  Quads Hamstrings Ankle DF EHL Ankle PF   Right +4/5 +4/5 +4/5 +4/5 +4/5 +4/5   Left +4/5 +4/5 +4/5 +4/5 +4/5 +4/5   Straight Leg raise - bilatreally.      normal gait and station     Ambulation without assistive device. full weight bearing, non-antalgic gait.       Rebecca Prim, NP

## 2021-07-21 ENCOUNTER — OFFICE VISIT (OUTPATIENT)
Dept: ORTHOPEDIC SURGERY | Age: 56
End: 2021-07-21
Payer: MEDICAID

## 2021-07-21 VITALS
HEART RATE: 94 BPM | WEIGHT: 141 LBS | TEMPERATURE: 97.5 F | BODY MASS INDEX: 27.68 KG/M2 | HEIGHT: 60 IN | OXYGEN SATURATION: 98 %

## 2021-07-21 DIAGNOSIS — Z98.1 S/P LUMBAR FUSION: Primary | ICD-10-CM

## 2021-07-21 DIAGNOSIS — M48.061 SPINAL STENOSIS AT L4-L5 LEVEL: ICD-10-CM

## 2021-07-21 DIAGNOSIS — M54.16 LUMBAR RADICULOPATHY: ICD-10-CM

## 2021-07-21 DIAGNOSIS — M54.50 LUMBAR PAIN: ICD-10-CM

## 2021-07-21 PROCEDURE — 99214 OFFICE O/P EST MOD 30 MIN: CPT | Performed by: NURSE PRACTITIONER

## 2021-07-21 RX ORDER — FLUTICASONE PROPIONATE AND SALMETEROL 50; 250 UG/1; UG/1
POWDER RESPIRATORY (INHALATION)
COMMUNITY
Start: 2021-05-26

## 2021-07-21 RX ORDER — METHYLPREDNISOLONE 4 MG/1
TABLET ORAL
Qty: 1 DOSE PACK | Refills: 0 | Status: SHIPPED | OUTPATIENT
Start: 2021-07-21 | End: 2021-08-26 | Stop reason: CLARIF

## 2021-07-21 RX ORDER — OMEPRAZOLE 20 MG/1
CAPSULE, DELAYED RELEASE ORAL
COMMUNITY
Start: 2021-05-26

## 2021-07-21 RX ORDER — PROPRANOLOL HYDROCHLORIDE 80 MG/1
80 CAPSULE, EXTENDED RELEASE ORAL
COMMUNITY
Start: 2021-05-26 | End: 2022-04-28

## 2021-07-21 RX ORDER — GABAPENTIN 600 MG/1
600 TABLET ORAL 3 TIMES DAILY
Qty: 270 TABLET | Refills: 1 | Status: SHIPPED | OUTPATIENT
Start: 2021-07-21 | End: 2021-08-04 | Stop reason: SDUPTHER

## 2021-07-21 RX ORDER — DULOXETIN HYDROCHLORIDE 30 MG/1
30 CAPSULE, DELAYED RELEASE ORAL DAILY
Qty: 30 CAPSULE | Refills: 1 | Status: SHIPPED | OUTPATIENT
Start: 2021-07-21 | End: 2021-08-04 | Stop reason: SDUPTHER

## 2021-07-21 NOTE — PROGRESS NOTES
Favio Pruitt presents today for   Chief Complaint   Patient presents with    Back Pain    Leg Pain     left       Is someone accompanying this pt? no    Is the patient using any DME equipment during OV? no    Depression Screening:  3 most recent PHQ Screens 5/25/2021   Little interest or pleasure in doing things Not at all   Feeling down, depressed, irritable, or hopeless Not at all   Total Score PHQ 2 0       Learning Assessment:  Learning Assessment 9/9/2019   PRIMARY LEARNER Patient   PRIMARY LANGUAGE ENGLISH   LEARNER PREFERENCE PRIMARY LISTENING   ANSWERED BY patient   RELATIONSHIP SELF       Coordination of Care:  1. Have you been to the ER, urgent care clinic since your last visit? no  Hospitalized since your last visit? no    2. Have you seen or consulted any other health care providers outside of the [address] [place] [address] since your last visit? Yes, pcp Include any pap smears or colon screening.  no

## 2021-07-29 DIAGNOSIS — M48.061 SPINAL STENOSIS AT L4-L5 LEVEL: Primary | ICD-10-CM

## 2021-07-29 RX ORDER — BACLOFEN 10 MG/1
5-10 TABLET ORAL
Qty: 21 TABLET | Refills: 0 | Status: SHIPPED | OUTPATIENT
Start: 2021-07-29 | End: 2021-08-26 | Stop reason: CLARIF

## 2021-07-29 NOTE — PROGRESS NOTES
Patient called after hours. Severe LBP today, some radiation into LE. Just finished MDP w/benefit, then went to work. Already on Gabapentin 600 TID, given trial of Cymbalta 7/21/21. No B/B changes. Rx  Baclofen prn until cymbalta takes effect. If worsening symptoms, go to urgent care.

## 2021-08-03 NOTE — PROGRESS NOTES
Chief complaint   Chief Complaint   Patient presents with    Back Pain    Leg Pain     leg       History of Present Illness:  Floridalma Rosales is a  61 y.o.  female  with history of and a left at L5-S1 done by Dr. Constance Goode on July 28, 2011. She did well following that. She now has had progressive low back pain with radiating left anterior thigh pain for many months now an MRI was obtained and she does have some pretty severe central canal stenosis at L4-5. She did undergo a left L4-5 selective nerve root block on May 4, 2021 which she states did help some but now the pain has returned and she states she would like to just go ahead and have the surgery as her pain is 8 out of 10 and constant. It is burning and aching. She states initially this all started in November when she slipped off an attic ladder. She states the baclofen 10 mg is not really helping that much so she is now taking Robaxin for muscle spasms. She is also on gabapentin and Cymbalta. She works as a . She is a non-smoker. She denies fever bowel bladder dysfunction. Physical Exam: The patient is a 42-year-old female well-developed well-nourished who is alert and oriented with a normal mood and affect. She has a full weightbearing nonantalgic gait. She did not use any assist device. She has 5 out of 5 strength of her right lower extremity. 3-4 out of 5 strength of her left. She has pain with hyperextension lumbar spine. Assessment and Plan: This is a patient who has central stenosis at L4-5. She has had a left at L5-S1. She has tried injections and medications which are not consistently helping her pain. She would like to go on and have a surgical consult with Dr. Constance Goode. I have put in the referral for that. I also refilled her gabapentin and her Cymbalta. We will see her back after she sees Dr. Constance Goode.       Medications:  Current Outpatient Medications   Medication Sig Dispense Refill    methocarbamoL (ROBAXIN) 500 mg tablet TAKE 2 TABLETS BY MOUTH 4 TIMES DAILY AS NEEDED FOR MUSCLE SPASM      gabapentin (NEURONTIN) 600 mg tablet Take 1 Tablet by mouth three (3) times daily. Max Daily Amount: 1,800 mg. 270 Tablet 2    DULoxetine (CYMBALTA) 30 mg capsule Take 1 Capsule by mouth daily. Indications: chronic muscle or bone pain, neuropathic pain 30 Capsule 2    baclofen (LIORESAL) 10 mg tablet Take 0.5-1 Tablets by mouth three (3) times daily as needed for Pain. Indications: for acute/episodic pain 21 Tablet 0    Advair Diskus 250-50 mcg/dose diskus inhaler INHALE 1 PUFF BY MOUTH TWICE DAILY      omeprazole (PRILOSEC) 20 mg capsule TAKE 1 CAPSULE BY MOUTH ONCE DAILY 30 MINUTES BEFORE MORNING MEAL      propranolol LA (INDERAL LA) 80 mg SR capsule TAKE 1 CAPSULE BY MOUTH ONCE DAILY FOR MIGRAINE      methylPREDNISolone (Medrol, Jose Miguel,) 4 mg tablet Per dose pack instructions 1 Dose Pack 0    magnesium oxide (MAG-OX) 400 mg tablet TAKE 1 TABLET BY MOUTH ONCE DAILY AS NEEDED      medroxyPROGESTERone (DEPO-PROVERA) 150 mg/mL syrg INJECT 1ML INTRAMUSCULARLY EVERY 90 DAYS      albuterol (PROVENTIL HFA, VENTOLIN HFA) 90 mcg/actuation inhaler Take 2 Puffs by inhalation every six (6) hours as needed.  fluticasone (FLOVENT HFA) 44 mcg/actuation inhaler Take 1 Puff by inhalation two (2) times a day.                Review of systems:    Past Medical History:   Diagnosis Date    Appetite loss 2013    Arthritis     in back    Asthma 2006    Back pain 8/31/2012    Chest pain     Degenerative disc disease, lumbar     DUB (dysfunctional uterine bleeding)     Foot pain, left     GERD (gastroesophageal reflux disease)     Headache(784.0)     migraine variant    Irregular heartbeat     Kidney calculus     Kidney stone     Menorrhagia     Pelvic pain in female     Postlaminectomy syndrome     S/P ankle ligament repair, left 2/7/2014    Stroke ([facility] [address] 1988, 1996, 1998    x3 - no residual    Tobacco abuse     Trichomonas  UTI (lower urinary tract infection)     Wears glasses      Past Surgical History:   Procedure Laterality Date    HX LUMBAR FUSION  2012    L4-L5    HX ORTHOPAEDIC Left 2014    ligament reconstruction    HX TONSILLECTOMY      HX TUBAL LIGATION  1996    x2     Social History     Socioeconomic History    Marital status:      Spouse name: Not on file    Number of children: Not on file    Years of education: Not on file    Highest education level: Not on file   Occupational History    Not on file   Tobacco Use    Smoking status: Current Every Day Smoker     Packs/day: 0.00     Years: 33.00     Pack years: 0.00     Last attempt to quit: 2013     Years since quittin.6    Smokeless tobacco: Never Used   Substance and Sexual Activity    Alcohol use: No     Alcohol/week: 0.0 standard drinks    Drug use: No    Sexual activity: Yes     Partners: Male     Birth control/protection: Surgical   Other Topics Concern    Not on file   Social History Narrative    Not on file     Social Determinants of Health     Financial Resource Strain:     Difficulty of Paying Living Expenses:    Food Insecurity:     Worried About Running Out of Food in the Last Year:     Ran Out of Food in the Last Year:    Transportation Needs:     Lack of Transportation (Medical):      Lack of Transportation (Non-Medical):    Physical Activity:     Days of Exercise per Week:     Minutes of Exercise per Session:    Stress:     Feeling of Stress :    Social Connections:     Frequency of Communication with Friends and Family:     Frequency of Social Gatherings with Friends and Family:     Attends [employer] Services:     Active Member of Clubs or Organizations:     Attends Club or Organization Meetings:     Marital Status:    Intimate Partner Violence:     Fear of Current or Ex-Partner:     Emotionally Abused:     Physically Abused:     Sexually Abused:      Family History   Problem Relation Age of Onset    Heart Disease Mother     Hypertension Mother     Stroke Mother     Hypertension Father     Hypertension Sister     Diabetes Sister        Physical Exam:  Visit Vitals  BP (!) 154/81 (BP 1 Location: Right upper arm, BP Patient Position: Sitting)   Pulse 93   Temp 97.7 °F (36.5 °C) (Temporal)   Resp 16   Ht 5' (1.524 m)   Wt 140 lb 6.4 oz (63.7 kg)   LMP 04/26/2015   SpO2 99%   BMI 27.42 kg/m²     Pain Scale: 8/10       has been . reviewed and is appropriate          Diagnoses and all orders for this visit:    1. Spinal stenosis at L4-L5 level  -     REFERRAL TO SPINE SURGERY  -     gabapentin (NEURONTIN) 600 mg tablet; Take 1 Tablet by mouth three (3) times daily. Max Daily Amount: 1,800 mg.  -     DULoxetine (CYMBALTA) 30 mg capsule; Take 1 Capsule by mouth daily. Indications: chronic muscle or bone pain, neuropathic pain    2. Lumbar radiculopathy  -     REFERRAL TO SPINE SURGERY  -     gabapentin (NEURONTIN) 600 mg tablet; Take 1 Tablet by mouth three (3) times daily. Max Daily Amount: 1,800 mg.  -     DULoxetine (CYMBALTA) 30 mg capsule; Take 1 Capsule by mouth daily. Indications: chronic muscle or bone pain, neuropathic pain    3. S/P lumbar fusion  -     REFERRAL TO SPINE SURGERY  -     gabapentin (NEURONTIN) 600 mg tablet; Take 1 Tablet by mouth three (3) times daily. Max Daily Amount: 1,800 mg.  -     DULoxetine (CYMBALTA) 30 mg capsule; Take 1 Capsule by mouth daily. Indications: chronic muscle or bone pain, neuropathic pain    4. Lumbar pain  -     gabapentin (NEURONTIN) 600 mg tablet; Take 1 Tablet by mouth three (3) times daily. Max Daily Amount: 1,800 mg.  -     DULoxetine (CYMBALTA) 30 mg capsule; Take 1 Capsule by mouth daily. Indications: chronic muscle or bone pain, neuropathic pain            Follow-up and Dispositions    · Return for After she sees Dr. Tracey Jackson for surgical consult.              We have informed Nacho Fenton to notify us for immediate appointment if she has any worsening neurogical symptoms or if an emergency situation presents, then call [phone]

## 2021-08-04 ENCOUNTER — TELEPHONE (OUTPATIENT)
Dept: ORTHOPEDIC SURGERY | Age: 56
End: 2021-08-04

## 2021-08-04 ENCOUNTER — OFFICE VISIT (OUTPATIENT)
Dept: ORTHOPEDIC SURGERY | Age: 56
End: 2021-08-04
Payer: MEDICAID

## 2021-08-04 VITALS
SYSTOLIC BLOOD PRESSURE: 154 MMHG | DIASTOLIC BLOOD PRESSURE: 81 MMHG | RESPIRATION RATE: 16 BRPM | TEMPERATURE: 97.7 F | BODY MASS INDEX: 27.56 KG/M2 | HEIGHT: 60 IN | WEIGHT: 140.4 LBS | HEART RATE: 93 BPM | OXYGEN SATURATION: 99 %

## 2021-08-04 DIAGNOSIS — M54.16 LUMBAR RADICULOPATHY: ICD-10-CM

## 2021-08-04 DIAGNOSIS — M54.50 LUMBAR PAIN: ICD-10-CM

## 2021-08-04 DIAGNOSIS — Z98.1 S/P LUMBAR FUSION: ICD-10-CM

## 2021-08-04 DIAGNOSIS — M48.061 SPINAL STENOSIS AT L4-L5 LEVEL: Primary | ICD-10-CM

## 2021-08-04 PROCEDURE — 99214 OFFICE O/P EST MOD 30 MIN: CPT | Performed by: NURSE PRACTITIONER

## 2021-08-04 RX ORDER — DULOXETIN HYDROCHLORIDE 30 MG/1
30 CAPSULE, DELAYED RELEASE ORAL DAILY
Qty: 30 CAPSULE | Refills: 2 | Status: SHIPPED | OUTPATIENT
Start: 2021-08-04 | End: 2021-09-24

## 2021-08-04 RX ORDER — METHOCARBAMOL 500 MG/1
TABLET, FILM COATED ORAL
COMMUNITY
Start: 2021-07-31 | End: 2021-09-24

## 2021-08-04 RX ORDER — GABAPENTIN 600 MG/1
600 TABLET ORAL 3 TIMES DAILY
Qty: 270 TABLET | Refills: 2 | Status: SHIPPED | OUTPATIENT
Start: 2021-08-04 | End: 2022-04-08

## 2021-08-04 NOTE — LETTER
NOTIFICATION RETURN TO WORK / SCHOOL    8/4/2021 1:24 PM    Ms. Chrissy Bey  [address] [place] [address] [address] [facility] [id number]      To Whom It May Concern:    Chrissy Bey is currently under the care of [facility]. She was seen in our office today 08/04/2021. If there are questions or concerns please have the patient contact our office.         Sincerely,      Brandy Grimaldo NP

## 2021-08-04 NOTE — PROGRESS NOTES
Jagruti Hernandes presents today for   Chief Complaint   Patient presents with    Back Pain    Leg Pain     leg       Is someone accompanying this pt? no    Is the patient using any DME equipment during OV? no    Coordination of Care:  1. Have you been to the ER, urgent care clinic since your last visit? Hospitalized since your last visit? Yes, patient first 7/31/2021 back pain      2. Have you seen or consulted any other health care providers outside of the [address] [place] [address] since your last visit? Include any pap smears or colon screening.  no

## 2021-08-04 NOTE — TELEPHONE ENCOUNTER
Patient called to report that she has scheduled with Dr. Kem Westbrook on 8/10. They told her they will need the xray and MRI reports and she wasn't sure if we would send this with a referral or if she needs to bring it with her to the appointment. Please advise patient at [phone].

## 2021-08-12 ENCOUNTER — HOSPITAL ENCOUNTER (OUTPATIENT)
Dept: GENERAL RADIOLOGY | Age: 56
Discharge: HOME | End: 2021-08-12
Payer: MEDICAID

## 2021-08-12 DIAGNOSIS — M48.061 SPINAL STENOSIS, LUMBAR: ICD-10-CM

## 2021-08-12 DIAGNOSIS — M54.10 RADICULOPATHY: ICD-10-CM

## 2021-08-12 PROCEDURE — 72114 X-RAY EXAM L-S SPINE BENDING: CPT

## 2021-08-17 ENCOUNTER — HOSPITAL ENCOUNTER (OUTPATIENT)
Dept: LAB | Age: 56
Discharge: HOME | End: 2021-08-17
Payer: MEDICAID

## 2021-08-17 ENCOUNTER — HOSPITAL ENCOUNTER (OUTPATIENT)
Dept: LAB | Age: 56
Discharge: HOME | End: 2021-08-17

## 2021-08-17 LAB — XX-LABCORP SPECIMEN COL,LCBCF: NORMAL

## 2021-08-17 PROCEDURE — 99001 SPECIMEN HANDLING PT-LAB: CPT

## 2021-08-17 PROCEDURE — 93005 ELECTROCARDIOGRAM TRACING: CPT

## 2021-08-19 LAB
ATRIAL RATE: 83 BPM
CALCULATED P AXIS, ECG09: 67 DEGREES
CALCULATED R AXIS, ECG10: -26 DEGREES
CALCULATED T AXIS, ECG11: 36 DEGREES
DIAGNOSIS, 93000: NORMAL
P-R INTERVAL, ECG05: 146 MS
Q-T INTERVAL, ECG07: 370 MS
QRS DURATION, ECG06: 84 MS
QTC CALCULATION (BEZET), ECG08: 434 MS
VENTRICULAR RATE, ECG03: 83 BPM

## 2021-08-26 ENCOUNTER — HOSPITAL ENCOUNTER (OUTPATIENT)
Dept: PREADMISSION TESTING | Age: 56
Discharge: HOME | End: 2021-08-26

## 2021-08-26 VITALS — BODY MASS INDEX: 22.5 KG/M2 | WEIGHT: 140 LBS | HEIGHT: 66 IN

## 2021-08-26 RX ORDER — SODIUM CHLORIDE, SODIUM LACTATE, POTASSIUM CHLORIDE, CALCIUM CHLORIDE 600; 310; 30; 20 MG/100ML; MG/100ML; MG/100ML; MG/100ML
125 INJECTION, SOLUTION INTRAVENOUS CONTINUOUS
Status: CANCELLED | OUTPATIENT
Start: 2021-08-26

## 2021-08-26 NOTE — PERIOP NOTES
Leave all valuables at home or loved ones;to include wallets/purse, money/credit cards, electronics  such as laptops and tablets. If you want to have your prescriptions filled here, please have some form of payment with your . Please arrange for your transportation home Denies any prosthetics. Patient states family physician is aware of upcoming procedure/surgery. Stop nsaids 7 days prior to [address] Do not put any lotion, jewelry, makeup, fingernail or toenail polish; no wigs, no private piercings; no tictac,gum and mouthwash. Denies sleep apnea. Denies family history of anesthesia complications. Please be aware that due to unforeseen circumstances, delays may occur and your patience will be appreciated. If you ae scheduled to be discharged the same day, please plan to be with us for most of the day. If an inpatient, room assignments may be delayed as well. Our priority is to make you as comfortable as possible and to keep your family informed of your status when possible. Pt aware: to come in for covid testing  prior to dos; to self quarantine (includes no visitors) after covid test done.  NO dnr  Covid 9-10-21 Bring inhaler on [address]

## 2021-08-29 ENCOUNTER — HOSPITAL ENCOUNTER (EMERGENCY)
Age: 56
Discharge: LEFT BEFORE DISCHARGE | End: 2021-08-29
Payer: MEDICAID

## 2021-08-29 PROCEDURE — 75810000275 HC EMERGENCY DEPT VISIT NO LEVEL OF CARE

## 2021-09-03 ENCOUNTER — HOSPITAL ENCOUNTER (EMERGENCY)
Age: 56
Discharge: HOME | End: 2021-09-03
Attending: STUDENT IN AN ORGANIZED HEALTH CARE EDUCATION/TRAINING PROGRAM
Payer: MEDICAID

## 2021-09-03 VITALS
TEMPERATURE: 98.5 F | SYSTOLIC BLOOD PRESSURE: 165 MMHG | RESPIRATION RATE: 16 BRPM | HEART RATE: 97 BPM | OXYGEN SATURATION: 99 % | DIASTOLIC BLOOD PRESSURE: 101 MMHG

## 2021-09-03 DIAGNOSIS — M54.32 SCIATICA OF LEFT SIDE: Primary | ICD-10-CM

## 2021-09-03 PROCEDURE — 99282 EMERGENCY DEPT VISIT SF MDM: CPT

## 2021-09-03 RX ORDER — ACETAMINOPHEN 325 MG/1
650 TABLET ORAL
Qty: 20 TABLET | Refills: 0 | Status: SHIPPED | OUTPATIENT
Start: 2021-09-03 | End: 2022-03-14 | Stop reason: ALTCHOICE

## 2021-09-03 RX ORDER — PREDNISONE 50 MG/1
50 TABLET ORAL DAILY
Qty: 3 TABLET | Refills: 0 | Status: SHIPPED | OUTPATIENT
Start: 2021-09-03 | End: 2021-09-06

## 2021-09-03 RX ORDER — DIAZEPAM 5 MG/1
5 TABLET ORAL
Qty: 20 TABLET | Refills: 0 | Status: SHIPPED | OUTPATIENT
Start: 2021-09-03 | End: 2021-09-24

## 2021-09-03 RX ORDER — KETOROLAC TROMETHAMINE 10 MG/1
10 TABLET, FILM COATED ORAL
Qty: 20 TABLET | Refills: 0 | Status: SHIPPED | OUTPATIENT
Start: 2021-09-03 | End: 2021-09-08

## 2021-09-03 NOTE — ED PROVIDER NOTES
EMERGENCY DEPARTMENT HISTORY AND PHYSICAL EXAM    Date: 9/3/2021  Patient Name: Sharath Beck    History of Presenting Illness     Chief Complaint   Patient presents with    Back Pain    Leg Pain         History Provided By: Patient    Chief Complaint: Left lower back pain with radiation in the left lower extremity  Duration: Weeks however worsening in the past 2 days  Timing: Gradually worsening  Location: Left lower back with radiation into the left lower extremity  Quality: Aching  Severity: Moderate  Modifying Factors: Worse with movement  Associated Symptoms: none       Additional History (Context): Sharath Beck is a 61 y.o. female with a history of kidney stones and disc disease who presents today for issues listed above. Patient reports she is followed by a spine specialist and was scheduled to have surgery September 15 however reports her insurance is now stating they will not cover until she completes physical therapy first.  Patient reports she has been trying 800 mg of Motrin at home without relief. Denies any loss of bowel or bladder, history of cancer or IV drug abuse. Denies any recent fevers or chills. Denies any new fall or injury. PCP: Carol Isabel MD    Current Outpatient Medications   Medication Sig Dispense Refill    ketorolac (TORADOL) 10 mg tablet Take 1 Tablet by mouth every six (6) hours as needed for Pain for up to 5 days. 20 Tablet 0    acetaminophen (TYLENOL) 325 mg tablet Take 2 Tablets by mouth every four (4) hours as needed for Pain. 20 Tablet 0    diazePAM (Valium) 5 mg tablet Take 1 Tablet by mouth three (3) times daily as needed for Muscle Spasm(s) (spasm). Max Daily Amount: 15 mg. 20 Tablet 0    predniSONE (DELTASONE) 50 mg tablet Take 1 Tablet by mouth daily for 3 days.  3 Tablet 0    methocarbamoL (ROBAXIN) 500 mg tablet TAKE 2 TABLETS BY MOUTH 4 TIMES DAILY AS NEEDED FOR MUSCLE SPASM      gabapentin (NEURONTIN) 600 mg tablet Take 1 Tablet by mouth three (3) times daily. Max Daily Amount: 1,800 mg. 270 Tablet 2    DULoxetine (CYMBALTA) 30 mg capsule Take 1 Capsule by mouth daily. Indications: chronic muscle or bone pain, neuropathic pain 30 Capsule 2    Advair Diskus 250-50 mcg/dose diskus inhaler INHALE 1 PUFF BY MOUTH TWICE DAILY      omeprazole (PRILOSEC) 20 mg capsule TAKE 1 CAPSULE BY MOUTH ONCE DAILY 30 MINUTES BEFORE MORNING MEAL      propranolol LA (INDERAL LA) 80 mg SR capsule nightly.  magnesium oxide (MAG-OX) 400 mg tablet TAKE 1 TABLET BY MOUTH ONCE DAILY AS NEEDED      medroxyPROGESTERone (DEPO-PROVERA) 150 mg/mL syrg INJECT 1ML INTRAMUSCULARLY EVERY 90 DAYS      albuterol (PROVENTIL HFA, VENTOLIN HFA) 90 mcg/actuation inhaler Take 2 Puffs by inhalation every six (6) hours as needed.  fluticasone (FLOVENT HFA) 44 mcg/actuation inhaler Take 1 Puff by inhalation two (2) times a day.            Past History     Past Medical History:  Past Medical History:   Diagnosis Date    Appetite loss 2013    Arthritis     in back    Asthma 2006    Back pain 8/31/2012    Chest pain     Degenerative disc disease, lumbar     DUB (dysfunctional uterine bleeding)     Foot pain, left     GERD (gastroesophageal reflux disease)     Headache(784.0)     migraine variant    Irregular heartbeat     Kidney calculus     Kidney stone     Menorrhagia     Pelvic pain in female     Postlaminectomy syndrome     S/P ankle ligament repair, left 2/7/2014    Stroke ([facility] [address] 1988, 1996, 1998    x3 - no residual    Tobacco abuse     Trichomonas     UTI (lower urinary tract infection)     Wears glasses 2007       Past Surgical History:  Past Surgical History:   Procedure Laterality Date    HX LUMBAR FUSION  2012    L4-L5    HX ORTHOPAEDIC Left 02/07/2014    ligament reconstruction    HX TONSILLECTOMY      HX TUBAL LIGATION  1996    x2       Family History:  Family History   Problem Relation Age of Onset    Heart Disease Mother     Hypertension Mother     Stroke Mother     Hypertension Father     Hypertension Sister     Diabetes Sister        Social History:  Social History     Tobacco Use    Smoking status: Current Some Day Smoker     Packs/day: 0.00     Years: 33.00     Pack years: 0.00     Last attempt to quit: 2013     Years since quittin.7    Smokeless tobacco: Never Used    Tobacco comment: no smoking within 24 hours of [employer] Vaping Use: Never used   Substance Use Topics    Alcohol use: No     Alcohol/week: 0.0 standard drinks    Drug use: No       Allergies: Allergies   Allergen Reactions    Meloxicam Itching    Nortriptyline Other (comments)    Penicillins Rash         Review of Systems   Review of Systems   Constitutional: Negative for chills and fever. HENT: Negative for congestion, rhinorrhea and sore throat. Respiratory: Negative for cough and shortness of breath. Cardiovascular: Negative for chest pain. Gastrointestinal: Negative for abdominal pain, blood in stool, constipation, diarrhea, nausea and vomiting. Genitourinary: Negative for dysuria, frequency and hematuria. Musculoskeletal: Positive for back pain. Negative for myalgias. Skin: Negative for rash and wound. Neurological: Negative for dizziness and headaches. All other systems reviewed and are negative. All Other Systems Negative  Physical Exam     Vitals:    21 1418   BP: (!) 165/101   Pulse: 97   Resp: 16   Temp: 98.5 °F (36.9 °C)   SpO2: 99%     Physical Exam  Vitals and nursing note reviewed. Constitutional:       General: She is not in acute distress. Appearance: She is well-developed. She is not diaphoretic. Comments: Well-appearing, smiling   HENT:      Head: Normocephalic and atraumatic. Eyes:      Conjunctiva/sclera: Conjunctivae normal.   Cardiovascular:      Rate and Rhythm: Normal rate and regular rhythm. Heart sounds: Normal heart sounds.    Pulmonary:      Effort: Pulmonary effort is normal. No respiratory distress. Breath sounds: Normal breath sounds. Chest:      Chest wall: No tenderness. Abdominal:      General: Bowel sounds are normal. There is no distension. Palpations: Abdomen is soft. Tenderness: There is no abdominal tenderness. There is no guarding or rebound. Musculoskeletal:         General: No deformity. Cervical back: Normal range of motion and neck supple. Comments: Left lower  paralumbar reproducible tenderness to palpation. No lumbar midline TTP. No other midline vertebral bony point tenderness or step-off. No foot drop. Distal sensation intact bilaterally, normal gait, no saddle anesthesia. Bilateral DP 3+. + Left straight leg raise. Skin:     General: Skin is warm and dry. Neurological:      Mental Status: She is alert and oriented to person, place, and time. Deep Tendon Reflexes: Reflexes are normal and symmetric. Diagnostic Study Results     Labs -   No results found for this or any previous visit (from the past 12 hour(s)). Radiologic Studies -   No orders to display     CT Results  (Last 48 hours)    None        CXR Results  (Last 48 hours)    None            Medical Decision Making   I am the first provider for this patient. I reviewed the vital signs, available nursing notes, past medical history, past surgical history, family history and social history. Vital Signs-Reviewed the patient's vital signs. Records Reviewed: Nursing Notes and Old Medical Records     Procedures: None   Procedures    Provider Notes (Medical Decision Making):     Differential Diagnosis: Musculoskeletal pain, myofascial strain/sprain, muscle spasm, spondylolisthesis, spondylosis, DJD, OA, sciatica, cauda equina syndrome    Plan: History and physical exam consistent with sciatica. No red flag signs or emergent need for imaging at this time. No midline tenderness. Will discharge home with pain medication and muscle relaxants. Have stressed the importance of stretching and hot baths with Epsom salt. Have advised orthopedic follow-up. Have discussed that pain medication refills in the emergency department moving forward is not appropriate. Patient agrees with the plan and management and states all questions have been thoroughly answered and there are no more remaining questions. MED RECONCILIATION:  No current facility-administered medications for this encounter. Current Outpatient Medications   Medication Sig    ketorolac (TORADOL) 10 mg tablet Take 1 Tablet by mouth every six (6) hours as needed for Pain for up to 5 days.  acetaminophen (TYLENOL) 325 mg tablet Take 2 Tablets by mouth every four (4) hours as needed for Pain.  diazePAM (Valium) 5 mg tablet Take 1 Tablet by mouth three (3) times daily as needed for Muscle Spasm(s) (spasm). Max Daily Amount: 15 mg.  predniSONE (DELTASONE) 50 mg tablet Take 1 Tablet by mouth daily for 3 days.  methocarbamoL (ROBAXIN) 500 mg tablet TAKE 2 TABLETS BY MOUTH 4 TIMES DAILY AS NEEDED FOR MUSCLE SPASM    gabapentin (NEURONTIN) 600 mg tablet Take 1 Tablet by mouth three (3) times daily. Max Daily Amount: 1,800 mg.    DULoxetine (CYMBALTA) 30 mg capsule Take 1 Capsule by mouth daily. Indications: chronic muscle or bone pain, neuropathic pain    Advair Diskus 250-50 mcg/dose diskus inhaler INHALE 1 PUFF BY MOUTH TWICE DAILY    omeprazole (PRILOSEC) 20 mg capsule TAKE 1 CAPSULE BY MOUTH ONCE DAILY 30 MINUTES BEFORE MORNING MEAL    propranolol LA (INDERAL LA) 80 mg SR capsule nightly.  magnesium oxide (MAG-OX) 400 mg tablet TAKE 1 TABLET BY MOUTH ONCE DAILY AS NEEDED    medroxyPROGESTERone (DEPO-PROVERA) 150 mg/mL syrg INJECT 1ML INTRAMUSCULARLY EVERY 90 DAYS    albuterol (PROVENTIL HFA, VENTOLIN HFA) 90 mcg/actuation inhaler Take 2 Puffs by inhalation every six (6) hours as needed.     fluticasone (FLOVENT HFA) 44 mcg/actuation inhaler Take 1 Puff by inhalation two (2) times a day. Disposition:  Home     DISCHARGE NOTE:   Pt has been reexamined. Patient has no new complaints, changes, or physical findings. Care plan outlined and precautions discussed. Results of workup were reviewed with the patient. All medications were reviewed with the patient. All of pt's questions and concerns were addressed. Patient was instructed and agrees to follow up with PCP/orthopedic spine specialist as well as to return to the ED upon further deterioration. Patient is ready to go home. Follow-up Information     Follow up With Specialties Details Why Contact Info    [id number] EMERGENCY DEPT Emergency Medicine  As needed [address] [facility]  [phone]    Follow-up with your spine specialist in 1 to 2 days              Discharge Medication List as of 9/3/2021  2:52 PM      START taking these medications    Details   ketorolac (TORADOL) 10 mg tablet Take 1 Tablet by mouth every six (6) hours as needed for Pain for up to 5 days. , Normal, Disp-20 Tablet, R-0      acetaminophen (TYLENOL) 325 mg tablet Take 2 Tablets by mouth every four (4) hours as needed for Pain., Normal, Disp-20 Tablet, R-0      diazePAM (Valium) 5 mg tablet Take 1 Tablet by mouth three (3) times daily as needed for Muscle Spasm(s) (spasm). Max Daily Amount: 15 mg., Normal, Disp-20 Tablet, R-0      predniSONE (DELTASONE) 50 mg tablet Take 1 Tablet by mouth daily for 3 days. , Normal, Disp-3 Tablet, R-0         CONTINUE these medications which have NOT CHANGED    Details   methocarbamoL (ROBAXIN) 500 mg tablet TAKE 2 TABLETS BY MOUTH 4 TIMES DAILY AS NEEDED FOR MUSCLE SPASM, Historical Med      gabapentin (NEURONTIN) 600 mg tablet Take 1 Tablet by mouth three (3) times daily. Max Daily Amount: 1,800 mg., Normal, Disp-270 Tablet, R-2      DULoxetine (CYMBALTA) 30 mg capsule Take 1 Capsule by mouth daily.  Indications: chronic muscle or bone pain, neuropathic pain, Normal, Disp-30 Capsule, R-2 Advair Diskus 250-50 mcg/dose diskus inhaler INHALE 1 PUFF BY MOUTH TWICE DAILY, Historical Med, ANNA      omeprazole (PRILOSEC) 20 mg capsule TAKE 1 CAPSULE BY MOUTH ONCE DAILY 30 MINUTES BEFORE MORNING MEAL, Historical Med      propranolol LA (INDERAL LA) 80 mg SR capsule nightly., Historical Med      magnesium oxide (MAG-OX) 400 mg tablet TAKE 1 TABLET BY MOUTH ONCE DAILY AS NEEDED, Historical Med      medroxyPROGESTERone (DEPO-PROVERA) 150 mg/mL syrg INJECT 1ML INTRAMUSCULARLY EVERY 90 DAYS, Historical Med      albuterol (PROVENTIL HFA, VENTOLIN HFA) 90 mcg/actuation inhaler Take 2 Puffs by inhalation every six (6) hours as needed., Historical Med      fluticasone (FLOVENT HFA) 44 mcg/actuation inhaler Take 1 Puff by inhalation two (2) times a day.  , Historical Med                 Diagnosis     Clinical Impression:   1. Sciatica of left side          \"Please note that this dictation was completed with [employer], the computer voice recognition software. Quite often unanticipated grammatical, syntax, homophones, and other interpretive errors are inadvertently transcribed by the computer software. Please disregard these errors. Please excuse any errors that have escaped final proofreading. \"

## 2021-09-03 NOTE — ED TRIAGE NOTES
Patient states she is suppose to be having lumbar surgery but it has been cancelled. She c/o pain to back and leg.

## 2021-09-03 NOTE — ED NOTES
Rachel Barajas is a 61 y.o. female that was discharged in stable condition. The patients diagnosis, condition and treatment were explained to  patient and aftercare instructions were given. The patient verbalized understanding. Patient armband removed and shredded.

## 2021-09-10 ENCOUNTER — HOSPITAL ENCOUNTER (OUTPATIENT)
Dept: PREADMISSION TESTING | Age: 56
Discharge: HOME | End: 2021-09-10
Payer: MEDICAID

## 2021-09-10 PROCEDURE — U0003 INFECTIOUS AGENT DETECTION BY NUCLEIC ACID (DNA OR RNA); SEVERE ACUTE RESPIRATORY SYNDROME CORONAVIRUS 2 (SARS-COV-2) (CORONAVIRUS DISEASE [COVID-19]), AMPLIFIED PROBE TECHNIQUE, MAKING USE OF HIGH THROUGHPUT TECHNOLOGIES AS DESCRIBED BY CMS-2020-01-R: HCPCS

## 2021-09-11 LAB — SARS-COV-2, COV2NT: NOT DETECTED

## 2021-09-14 NOTE — PERIOP NOTES
Left message for patient regarding MRSA test on 8/27. Left message again for pt on 9/8/21  Spoke with Angelo Griffith at Dr Betina James office, she will reach out to pt. Spoke with Keri finney no MRSA test completed by patient. Per Dr. Tushar Monroy, pt will be tx with ointment and Vancomycin.

## 2021-09-14 NOTE — H&P
Pre-Admission History and Physical    Patient: Nacho Fenton   MRN: [id number]   SSN: [id number]   YOB: 1965   Age: 61 y.o. Sex: female     Patient scheduled for: lumbar 4/5 lami fusion TLIF. Date of surgery: 9/15/21. Surgeon: Herminia Thompson MD    HPI:  Nacho Fenton is a 61 y.o. female with progressing back and leg pain with burning dysesthetic pain. She reports a pain level of 8/10. MRI demonstrates severe degenerative changes at the segment above her previous fusion with central lateral recess stenosis and left sided disc protrusion at L4/5. This patient has failed the presurgical conservative treatments  including physical therapy, spinal block injections and medications. Pain has impacted the patient's functional ability. She is being admitted for surgical intervention.          Past Medical History:   Diagnosis Date    Appetite loss     Arthritis     in back    Asthma 2006    Back pain 2012    Chest pain     Degenerative disc disease, lumbar     DUB (dysfunctional uterine bleeding)     Foot pain, left     GERD (gastroesophageal reflux disease)     Headache(784.0)     migraine variant    Irregular heartbeat     Kidney calculus     Kidney stone     Menorrhagia     Pelvic pain in female     Postlaminectomy syndrome     S/P ankle ligament repair, left 2014    [facility]    x[facility] - no residual    Tobacco abuse     Trichomonas     UTI (lower urinary tract infection)     Wears glasses      Social History     Socioeconomic History    Marital status:      Spouse name: Not on file    Number of children: Not on file    Years of education: Not on file    Highest education level: Not on file   Tobacco Use    Smoking status: Current Some Day Smoker     Packs/day: 0.00     Years: 33.00     Pack years: 0.00     Last attempt to quit: 2013     Years since quittin.7    Smokeless tobacco: Never Used    Tobacco comment: no smoking within 24 hours of dos   Vaping Use    Vaping Use: Never used   Substance and Sexual Activity    Alcohol use: No     Alcohol/week: 0.0 standard drinks    Drug use: No    Sexual activity: Yes     Partners: Male     Birth control/protection: Surgical     Social Determinants of Health     Financial Resource Strain:     Difficulty of Paying Living Expenses:    Food Insecurity:     Worried About Running Out of Food in the Last Year:     [address] in the Last Year:    Transportation Needs:     Lack of Transportation (Medical):  Lack of Transportation (Non-Medical):    Physical Activity:     Days of Exercise per Week:     Minutes of Exercise per Session:    Stress:     Feeling of Stress :    Social Connections:     Frequency of Communication with Friends and Family:     Frequency of Social Gatherings with Friends and Family:     Attends Synagogue Services:     Active Member of Clubs or Organizations:     Attends Club or Organization Meetings:     Marital Status:      Past Surgical History:   Procedure Laterality Date    HX LUMBAR FUSION  2012    L4-L5    HX ORTHOPAEDIC Left 02/07/2014    ligament reconstruction    HX TONSILLECTOMY      HX TUBAL LIGATION  1996    x2     Family History   Problem Relation Age of Onset    Heart Disease Mother     Hypertension Mother     Stroke Mother     Hypertension Father     Hypertension Sister     Diabetes Sister      Allergies   Allergen Reactions    Meloxicam Itching    Nortriptyline Other (comments)    Penicillins Rash     Current Outpatient Medications   Medication Sig Dispense Refill    acetaminophen (TYLENOL) 325 mg tablet Take 2 Tablets by mouth every four (4) hours as needed for Pain. 20 Tablet 0    diazePAM (Valium) 5 mg tablet Take 1 Tablet by mouth three (3) times daily as needed for Muscle Spasm(s) (spasm).  Max Daily Amount: 15 mg. 20 Tablet 0    methocarbamoL (ROBAXIN) 500 mg tablet TAKE 2 TABLETS BY MOUTH 4 TIMES DAILY AS NEEDED FOR MUSCLE SPASM      gabapentin (NEURONTIN) 600 mg tablet Take 1 Tablet by mouth three (3) times daily. Max Daily Amount: 1,800 mg. 270 Tablet 2    DULoxetine (CYMBALTA) 30 mg capsule Take 1 Capsule by mouth daily. Indications: chronic muscle or bone pain, neuropathic pain 30 Capsule 2    Advair Diskus 250-50 mcg/dose diskus inhaler INHALE 1 PUFF BY MOUTH TWICE DAILY      omeprazole (PRILOSEC) 20 mg capsule TAKE 1 CAPSULE BY MOUTH ONCE DAILY 30 MINUTES BEFORE MORNING MEAL      propranolol LA (INDERAL LA) 80 mg SR capsule nightly.  magnesium oxide (MAG-OX) 400 mg tablet TAKE 1 TABLET BY MOUTH ONCE DAILY AS NEEDED      medroxyPROGESTERone (DEPO-PROVERA) 150 mg/mL syrg INJECT 1ML INTRAMUSCULARLY EVERY 90 DAYS      albuterol (PROVENTIL HFA, VENTOLIN HFA) 90 mcg/actuation inhaler Take 2 Puffs by inhalation every six (6) hours as needed.  fluticasone (FLOVENT HFA) 44 mcg/actuation inhaler Take 1 Puff by inhalation two (2) times a day. ROS:  Denies chills, fever,night sweats,  bowel or bladder dysfunction, unexplained weight loss/weight gain, chest pain, sob or anxiety. Physical Examination    Gen: Well developed, well nourished 61 y.o. female with mechanical back pain with radiating left leg radiculopathy. Positive straight leg raise. Good strength of her EHL, tib ant, hams, and quads. No clonus, no hoffmans, no babinski. 2+ pulses. Soft abdomen. Assessment and Plan    Due to the pt's persistent symptoms unrelieved by conservative measure Davida Sotomayor is being admitted to undergo surgical intervention. The post-operative plan of care consists of physical therapy, home health and a 2 week f/u office visit. The risks, benefits, complications and alternatives to surgery have been discussed in detail with the patient. The patient understands and agrees to proceed.

## 2021-09-15 ENCOUNTER — ANESTHESIA (OUTPATIENT)
Dept: SURGERY | Age: 56
End: 2021-09-15
Payer: MEDICAID

## 2021-09-15 ENCOUNTER — APPOINTMENT (OUTPATIENT)
Dept: GENERAL RADIOLOGY | Age: 56
End: 2021-09-15
Attending: ORTHOPAEDIC SURGERY
Payer: MEDICAID

## 2021-09-15 ENCOUNTER — ANESTHESIA EVENT (OUTPATIENT)
Dept: SURGERY | Age: 56
End: 2021-09-15
Payer: MEDICAID

## 2021-09-15 ENCOUNTER — HOSPITAL ENCOUNTER (OUTPATIENT)
Age: 56
Discharge: HOME | End: 2021-09-15
Attending: ORTHOPAEDIC SURGERY | Admitting: ORTHOPAEDIC SURGERY
Payer: MEDICAID

## 2021-09-15 VITALS
DIASTOLIC BLOOD PRESSURE: 77 MMHG | WEIGHT: 146.3 LBS | HEIGHT: 66 IN | OXYGEN SATURATION: 95 % | BODY MASS INDEX: 23.51 KG/M2 | SYSTOLIC BLOOD PRESSURE: 128 MMHG | TEMPERATURE: 97.1 F | HEART RATE: 86 BPM | RESPIRATION RATE: 16 BRPM

## 2021-09-15 DIAGNOSIS — Z98.1 S/P LAMINECTOMY WITH SPINAL FUSION: Primary | ICD-10-CM

## 2021-09-15 PROBLEM — M43.16 SPONDYLOLISTHESIS AT L4-L5 LEVEL: Status: ACTIVE | Noted: 2021-09-15

## 2021-09-15 LAB
ABO + RH BLD: NORMAL
BLOOD GROUP ANTIBODIES SERPL: NORMAL
SPECIMEN EXP DATE BLD: NORMAL

## 2021-09-15 PROCEDURE — 77030004402 HC BUR NEUR STRY -C: Performed by: ORTHOPAEDIC SURGERY

## 2021-09-15 PROCEDURE — 77030005513 HC CATH URETH FOL11 MDII -B: Performed by: ORTHOPAEDIC SURGERY

## 2021-09-15 PROCEDURE — 74011250636 HC RX REV CODE- 250/636: Performed by: ORTHOPAEDIC SURGERY

## 2021-09-15 PROCEDURE — 77030014007 HC SPNG HEMSTAT J&J -B: Performed by: ORTHOPAEDIC SURGERY

## 2021-09-15 PROCEDURE — C1821 INTERSPINOUS IMPLANT: HCPCS | Performed by: ORTHOPAEDIC SURGERY

## 2021-09-15 PROCEDURE — C1713 ANCHOR/SCREW BN/BN,TIS/BN: HCPCS | Performed by: ORTHOPAEDIC SURGERY

## 2021-09-15 PROCEDURE — 77030020010 HC FCPS BPLR DISP INLC -E: Performed by: ORTHOPAEDIC SURGERY

## 2021-09-15 PROCEDURE — 77030035236 HC SUT PDS STRATFX BARB J&J -B: Performed by: ORTHOPAEDIC SURGERY

## 2021-09-15 PROCEDURE — 77030040361 HC SLV COMPR DVT MDII -B: Performed by: ORTHOPAEDIC SURGERY

## 2021-09-15 PROCEDURE — 97116 GAIT TRAINING THERAPY: CPT

## 2021-09-15 PROCEDURE — 77030010507 HC ADH SKN DERMBND J&J -B: Performed by: ORTHOPAEDIC SURGERY

## 2021-09-15 PROCEDURE — 77030008477 HC STYL SATN SLP COVD -A: Performed by: ANESTHESIOLOGY

## 2021-09-15 PROCEDURE — 77030008683 HC TU ET CUF COVD -A: Performed by: ANESTHESIOLOGY

## 2021-09-15 PROCEDURE — 74011000250 HC RX REV CODE- 250: Performed by: ORTHOPAEDIC SURGERY

## 2021-09-15 PROCEDURE — 97535 SELF CARE MNGMENT TRAINING: CPT

## 2021-09-15 PROCEDURE — 77030020268 HC MISC GENERAL SUPPLY: Performed by: ORTHOPAEDIC SURGERY

## 2021-09-15 PROCEDURE — 77030034475 HC MISC IMPL SPN: Performed by: ORTHOPAEDIC SURGERY

## 2021-09-15 PROCEDURE — 76060000035 HC ANESTHESIA 2 TO 2.5 HR: Performed by: ORTHOPAEDIC SURGERY

## 2021-09-15 PROCEDURE — 76210000016 HC OR PH I REC 1 TO 1.5 HR: Performed by: ORTHOPAEDIC SURGERY

## 2021-09-15 PROCEDURE — 76010000131 HC OR TIME 2 TO 2.5 HR: Performed by: ORTHOPAEDIC SURGERY

## 2021-09-15 PROCEDURE — 77030003028 HC SUT VCRL J&J -A: Performed by: ORTHOPAEDIC SURGERY

## 2021-09-15 PROCEDURE — 77030031139 HC SUT VCRL2 J&J -A: Performed by: ORTHOPAEDIC SURGERY

## 2021-09-15 PROCEDURE — 76210000026 HC REC RM PH II 1 TO 1.5 HR: Performed by: ORTHOPAEDIC SURGERY

## 2021-09-15 PROCEDURE — 36415 COLL VENOUS BLD VENIPUNCTURE: CPT

## 2021-09-15 PROCEDURE — 77030014008 HC SPNG HEMSTAT J&J -C: Performed by: ORTHOPAEDIC SURGERY

## 2021-09-15 PROCEDURE — 77030033138 HC SUT PGA STRATFX J&J -B: Performed by: ORTHOPAEDIC SURGERY

## 2021-09-15 PROCEDURE — 74011250636 HC RX REV CODE- 250/636: Performed by: ANESTHESIOLOGY

## 2021-09-15 PROCEDURE — 77030002933 HC SUT MCRYL J&J -A: Performed by: ORTHOPAEDIC SURGERY

## 2021-09-15 PROCEDURE — 77030034479 HC ADH SKN CLSR PRINEO J&J -B: Performed by: ORTHOPAEDIC SURGERY

## 2021-09-15 PROCEDURE — 74011250636 HC RX REV CODE- 250/636: Performed by: NURSE ANESTHETIST, CERTIFIED REGISTERED

## 2021-09-15 PROCEDURE — 97166 OT EVAL MOD COMPLEX 45 MIN: CPT

## 2021-09-15 PROCEDURE — 77030012406 HC DRN WND PENRS BARD -A: Performed by: ORTHOPAEDIC SURGERY

## 2021-09-15 PROCEDURE — 74011000250 HC RX REV CODE- 250: Performed by: NURSE ANESTHETIST, CERTIFIED REGISTERED

## 2021-09-15 PROCEDURE — 77030006643: Performed by: ANESTHESIOLOGY

## 2021-09-15 PROCEDURE — 97161 PT EVAL LOW COMPLEX 20 MIN: CPT

## 2021-09-15 PROCEDURE — 77030020782 HC GWN BAIR PAWS FLX 3M -B: Performed by: ORTHOPAEDIC SURGERY

## 2021-09-15 PROCEDURE — 86901 BLOOD TYPING SEROLOGIC RH(D): CPT

## 2021-09-15 PROCEDURE — 77030038552 HC DRN WND MDII -A: Performed by: ORTHOPAEDIC SURGERY

## 2021-09-15 PROCEDURE — 2709999900 HC NON-CHARGEABLE SUPPLY: Performed by: ORTHOPAEDIC SURGERY

## 2021-09-15 DEVICE — SCREW SPNL L45MM DIA6.5MM PEDCL POLYAX 2 LD THRD TOP LD FOR: Type: IMPLANTABLE DEVICE | Site: SPINE LUMBAR | Status: FUNCTIONAL

## 2021-09-15 DEVICE — BONE GRAFT SUBSTITUTE, FOAM FLOW, BETA-TRICALCIUM PHOSPHATE AND TYPE I BOVINE COLLAGEN
Type: IMPLANTABLE DEVICE | Site: SPINE LUMBAR | Status: FUNCTIONAL
Brand: VITOSS

## 2021-09-15 DEVICE — SET SCR SPNL POLYAX ATR EVEREST: Type: IMPLANTABLE DEVICE | Site: SPINE LUMBAR | Status: FUNCTIONAL

## 2021-09-15 DEVICE — ROD SPNL L45MM DIA5.5MM POST TI LUM CNTOUR BK SMOOTH DENALI: Type: IMPLANTABLE DEVICE | Site: SPINE LUMBAR | Status: FUNCTIONAL

## 2021-09-15 RX ORDER — FENTANYL CITRATE 50 UG/ML
25 INJECTION, SOLUTION INTRAMUSCULAR; INTRAVENOUS
Status: DISCONTINUED | OUTPATIENT
Start: 2021-09-15 | End: 2021-09-15 | Stop reason: HOSPADM

## 2021-09-15 RX ORDER — LORAZEPAM 2 MG/ML
1 INJECTION INTRAMUSCULAR
Status: CANCELLED | OUTPATIENT
Start: 2021-09-15

## 2021-09-15 RX ORDER — KETAMINE HYDROCHLORIDE 10 MG/ML
INJECTION, SOLUTION INTRAMUSCULAR; INTRAVENOUS AS NEEDED
Status: DISCONTINUED | OUTPATIENT
Start: 2021-09-15 | End: 2021-09-15 | Stop reason: HOSPADM

## 2021-09-15 RX ORDER — MIDAZOLAM HYDROCHLORIDE 1 MG/ML
INJECTION, SOLUTION INTRAMUSCULAR; INTRAVENOUS AS NEEDED
Status: DISCONTINUED | OUTPATIENT
Start: 2021-09-15 | End: 2021-09-15 | Stop reason: HOSPADM

## 2021-09-15 RX ORDER — SODIUM CHLORIDE 0.9 % (FLUSH) 0.9 %
5-40 SYRINGE (ML) INJECTION EVERY 8 HOURS
Status: DISCONTINUED | OUTPATIENT
Start: 2021-09-15 | End: 2021-09-15 | Stop reason: HOSPADM

## 2021-09-15 RX ORDER — OXYCODONE HYDROCHLORIDE 5 MG/1
5 TABLET ORAL
Qty: 28 TABLET | Refills: 0 | Status: SHIPPED | OUTPATIENT
Start: 2021-09-15 | End: 2021-09-22

## 2021-09-15 RX ORDER — HYDROMORPHONE HYDROCHLORIDE 1 MG/ML
INJECTION, SOLUTION INTRAMUSCULAR; INTRAVENOUS; SUBCUTANEOUS AS NEEDED
Status: DISCONTINUED | OUTPATIENT
Start: 2021-09-15 | End: 2021-09-15 | Stop reason: HOSPADM

## 2021-09-15 RX ORDER — OXYCODONE HYDROCHLORIDE 5 MG/1
5-10 TABLET ORAL
Status: CANCELLED | OUTPATIENT
Start: 2021-09-15

## 2021-09-15 RX ORDER — INSULIN LISPRO 100 [IU]/ML
INJECTION, SOLUTION INTRAVENOUS; SUBCUTANEOUS ONCE
Status: DISCONTINUED | OUTPATIENT
Start: 2021-09-15 | End: 2021-09-15 | Stop reason: HOSPADM

## 2021-09-15 RX ORDER — FENTANYL CITRATE 50 UG/ML
INJECTION, SOLUTION INTRAMUSCULAR; INTRAVENOUS AS NEEDED
Status: DISCONTINUED | OUTPATIENT
Start: 2021-09-15 | End: 2021-09-15 | Stop reason: HOSPADM

## 2021-09-15 RX ORDER — MAGNESIUM SULFATE 100 %
4 CRYSTALS MISCELLANEOUS AS NEEDED
Status: DISCONTINUED | OUTPATIENT
Start: 2021-09-15 | End: 2021-09-15 | Stop reason: HOSPADM

## 2021-09-15 RX ORDER — GLYCOPYRROLATE 0.2 MG/ML
INJECTION INTRAMUSCULAR; INTRAVENOUS AS NEEDED
Status: DISCONTINUED | OUTPATIENT
Start: 2021-09-15 | End: 2021-09-15 | Stop reason: HOSPADM

## 2021-09-15 RX ORDER — ONDANSETRON 2 MG/ML
INJECTION INTRAMUSCULAR; INTRAVENOUS AS NEEDED
Status: DISCONTINUED | OUTPATIENT
Start: 2021-09-15 | End: 2021-09-15 | Stop reason: HOSPADM

## 2021-09-15 RX ORDER — ROCURONIUM BROMIDE 10 MG/ML
INJECTION, SOLUTION INTRAVENOUS AS NEEDED
Status: DISCONTINUED | OUTPATIENT
Start: 2021-09-15 | End: 2021-09-15 | Stop reason: HOSPADM

## 2021-09-15 RX ORDER — SODIUM CHLORIDE 0.9 % (FLUSH) 0.9 %
5-40 SYRINGE (ML) INJECTION AS NEEDED
Status: CANCELLED | OUTPATIENT
Start: 2021-09-15

## 2021-09-15 RX ORDER — FAMOTIDINE 20 MG/1
40 TABLET, FILM COATED ORAL EVERY 12 HOURS
Status: CANCELLED | OUTPATIENT
Start: 2021-09-15

## 2021-09-15 RX ORDER — ONDANSETRON 2 MG/ML
4 INJECTION INTRAMUSCULAR; INTRAVENOUS
Status: CANCELLED | OUTPATIENT
Start: 2021-09-15

## 2021-09-15 RX ORDER — NALOXONE HYDROCHLORIDE 0.4 MG/ML
0.4 INJECTION, SOLUTION INTRAMUSCULAR; INTRAVENOUS; SUBCUTANEOUS AS NEEDED
Status: CANCELLED | OUTPATIENT
Start: 2021-09-15

## 2021-09-15 RX ORDER — HYDROCODONE BITARTRATE AND ACETAMINOPHEN 5; 325 MG/1; MG/1
1 TABLET ORAL AS NEEDED
Status: DISCONTINUED | OUTPATIENT
Start: 2021-09-15 | End: 2021-09-15 | Stop reason: HOSPADM

## 2021-09-15 RX ORDER — VANCOMYCIN HYDROCHLORIDE 1 G/20ML
INJECTION, POWDER, LYOPHILIZED, FOR SOLUTION INTRAVENOUS AS NEEDED
Status: DISCONTINUED | OUTPATIENT
Start: 2021-09-15 | End: 2021-09-15 | Stop reason: HOSPADM

## 2021-09-15 RX ORDER — LIDOCAINE HYDROCHLORIDE 20 MG/ML
INJECTION, SOLUTION EPIDURAL; INFILTRATION; INTRACAUDAL; PERINEURAL AS NEEDED
Status: DISCONTINUED | OUTPATIENT
Start: 2021-09-15 | End: 2021-09-15 | Stop reason: HOSPADM

## 2021-09-15 RX ORDER — TRANEXAMIC ACID 10 MG/ML
1 INJECTION, SOLUTION INTRAVENOUS ONCE
Status: COMPLETED | OUTPATIENT
Start: 2021-09-15 | End: 2021-09-15

## 2021-09-15 RX ORDER — DIAZEPAM 5 MG/1
5 TABLET ORAL
Status: CANCELLED | OUTPATIENT
Start: 2021-09-15

## 2021-09-15 RX ORDER — DEXAMETHASONE SODIUM PHOSPHATE 4 MG/ML
INJECTION, SOLUTION INTRA-ARTICULAR; INTRALESIONAL; INTRAMUSCULAR; INTRAVENOUS; SOFT TISSUE AS NEEDED
Status: DISCONTINUED | OUTPATIENT
Start: 2021-09-15 | End: 2021-09-15 | Stop reason: HOSPADM

## 2021-09-15 RX ORDER — HYDROMORPHONE HYDROCHLORIDE 1 MG/ML
0.2 INJECTION, SOLUTION INTRAMUSCULAR; INTRAVENOUS; SUBCUTANEOUS AS NEEDED
Status: DISCONTINUED | OUTPATIENT
Start: 2021-09-15 | End: 2021-09-15 | Stop reason: HOSPADM

## 2021-09-15 RX ORDER — ONDANSETRON 2 MG/ML
4 INJECTION INTRAMUSCULAR; INTRAVENOUS ONCE
Status: DISCONTINUED | OUTPATIENT
Start: 2021-09-15 | End: 2021-09-15 | Stop reason: HOSPADM

## 2021-09-15 RX ORDER — PROPRANOLOL HYDROCHLORIDE 80 MG/1
80 CAPSULE, EXTENDED RELEASE ORAL DAILY
Status: CANCELLED | OUTPATIENT
Start: 2021-09-15

## 2021-09-15 RX ORDER — BUDESONIDE 0.25 MG/2ML
250 INHALANT ORAL 2 TIMES DAILY
Status: CANCELLED | OUTPATIENT
Start: 2021-09-15

## 2021-09-15 RX ORDER — DOCUSATE SODIUM 100 MG/1
100 CAPSULE, LIQUID FILLED ORAL 2 TIMES DAILY
Status: CANCELLED | OUTPATIENT
Start: 2021-09-15

## 2021-09-15 RX ORDER — ALBUTEROL SULFATE 0.83 MG/ML
2.5 SOLUTION RESPIRATORY (INHALATION) AS NEEDED
Status: DISCONTINUED | OUTPATIENT
Start: 2021-09-15 | End: 2021-09-15 | Stop reason: HOSPADM

## 2021-09-15 RX ORDER — PROPOFOL 10 MG/ML
INJECTION, EMULSION INTRAVENOUS AS NEEDED
Status: DISCONTINUED | OUTPATIENT
Start: 2021-09-15 | End: 2021-09-15 | Stop reason: HOSPADM

## 2021-09-15 RX ORDER — NALOXONE HYDROCHLORIDE 0.4 MG/ML
0.1 INJECTION, SOLUTION INTRAMUSCULAR; INTRAVENOUS; SUBCUTANEOUS AS NEEDED
Status: DISCONTINUED | OUTPATIENT
Start: 2021-09-15 | End: 2021-09-15 | Stop reason: HOSPADM

## 2021-09-15 RX ORDER — PANTOPRAZOLE SODIUM 20 MG/1
20 TABLET, DELAYED RELEASE ORAL
Status: CANCELLED | OUTPATIENT
Start: 2021-09-16

## 2021-09-15 RX ORDER — SODIUM CHLORIDE, SODIUM LACTATE, POTASSIUM CHLORIDE, CALCIUM CHLORIDE 600; 310; 30; 20 MG/100ML; MG/100ML; MG/100ML; MG/100ML
150 INJECTION, SOLUTION INTRAVENOUS CONTINUOUS
Status: DISCONTINUED | OUTPATIENT
Start: 2021-09-15 | End: 2021-09-15 | Stop reason: HOSPADM

## 2021-09-15 RX ORDER — GABAPENTIN 300 MG/1
600 CAPSULE ORAL 3 TIMES DAILY
Status: CANCELLED | OUTPATIENT
Start: 2021-09-15

## 2021-09-15 RX ORDER — SODIUM CHLORIDE 0.9 % (FLUSH) 0.9 %
5-40 SYRINGE (ML) INJECTION EVERY 8 HOURS
Status: CANCELLED | OUTPATIENT
Start: 2021-09-15

## 2021-09-15 RX ORDER — DIPHENHYDRAMINE HYDROCHLORIDE 50 MG/ML
12.5 INJECTION, SOLUTION INTRAMUSCULAR; INTRAVENOUS
Status: CANCELLED | OUTPATIENT
Start: 2021-09-15

## 2021-09-15 RX ORDER — DEXTROSE 50 % IN WATER (D50W) INTRAVENOUS SYRINGE
25-50 AS NEEDED
Status: DISCONTINUED | OUTPATIENT
Start: 2021-09-15 | End: 2021-09-15 | Stop reason: HOSPADM

## 2021-09-15 RX ORDER — NEOSTIGMINE METHYLSULFATE 1 MG/ML
INJECTION, SOLUTION INTRAVENOUS AS NEEDED
Status: DISCONTINUED | OUTPATIENT
Start: 2021-09-15 | End: 2021-09-15 | Stop reason: HOSPADM

## 2021-09-15 RX ORDER — DIPHENHYDRAMINE HYDROCHLORIDE 50 MG/ML
12.5 INJECTION, SOLUTION INTRAMUSCULAR; INTRAVENOUS
Status: DISCONTINUED | OUTPATIENT
Start: 2021-09-15 | End: 2021-09-15 | Stop reason: HOSPADM

## 2021-09-15 RX ORDER — DULOXETIN HYDROCHLORIDE 30 MG/1
30 CAPSULE, DELAYED RELEASE ORAL DAILY
Status: CANCELLED | OUTPATIENT
Start: 2021-09-15

## 2021-09-15 RX ORDER — PHENYLEPHRINE HYDROCHLORIDE 10 MG/ML
INJECTION INTRAVENOUS AS NEEDED
Status: DISCONTINUED | OUTPATIENT
Start: 2021-09-15 | End: 2021-09-15 | Stop reason: HOSPADM

## 2021-09-15 RX ORDER — ALBUTEROL SULFATE 0.83 MG/ML
2.5 SOLUTION RESPIRATORY (INHALATION)
Status: CANCELLED | OUTPATIENT
Start: 2021-09-15

## 2021-09-15 RX ORDER — SODIUM CHLORIDE 0.9 % (FLUSH) 0.9 %
5-40 SYRINGE (ML) INJECTION AS NEEDED
Status: DISCONTINUED | OUTPATIENT
Start: 2021-09-15 | End: 2021-09-15 | Stop reason: HOSPADM

## 2021-09-15 RX ORDER — ACETAMINOPHEN 500 MG
1000 TABLET ORAL EVERY 6 HOURS
Status: CANCELLED | OUTPATIENT
Start: 2021-09-15

## 2021-09-15 RX ORDER — HYDROMORPHONE HYDROCHLORIDE 1 MG/ML
1 INJECTION, SOLUTION INTRAMUSCULAR; INTRAVENOUS; SUBCUTANEOUS
Status: CANCELLED | OUTPATIENT
Start: 2021-09-15

## 2021-09-15 RX ORDER — SODIUM CHLORIDE, SODIUM LACTATE, POTASSIUM CHLORIDE, CALCIUM CHLORIDE 600; 310; 30; 20 MG/100ML; MG/100ML; MG/100ML; MG/100ML
125 INJECTION, SOLUTION INTRAVENOUS CONTINUOUS
Status: DISCONTINUED | OUTPATIENT
Start: 2021-09-15 | End: 2021-09-15 | Stop reason: HOSPADM

## 2021-09-15 RX ADMIN — ROCURONIUM BROMIDE 10 MG: 10 INJECTION, SOLUTION INTRAVENOUS at 08:39

## 2021-09-15 RX ADMIN — MIDAZOLAM 2 MG: 1 INJECTION INTRAMUSCULAR; INTRAVENOUS at 07:33

## 2021-09-15 RX ADMIN — KETAMINE HYDROCHLORIDE 20 MG: 10 INJECTION, SOLUTION INTRAMUSCULAR; INTRAVENOUS at 08:10

## 2021-09-15 RX ADMIN — ROCURONIUM BROMIDE 10 MG: 10 INJECTION, SOLUTION INTRAVENOUS at 08:15

## 2021-09-15 RX ADMIN — DEXAMETHASONE SODIUM PHOSPHATE 4 MG: 4 INJECTION, SOLUTION INTRAMUSCULAR; INTRAVENOUS at 08:40

## 2021-09-15 RX ADMIN — PHENYLEPHRINE HYDROCHLORIDE 100 MCG: 10 INJECTION INTRAVENOUS at 08:18

## 2021-09-15 RX ADMIN — PROPOFOL 150 MG: 10 INJECTION, EMULSION INTRAVENOUS at 07:41

## 2021-09-15 RX ADMIN — SODIUM CHLORIDE, SODIUM LACTATE, POTASSIUM CHLORIDE, AND CALCIUM CHLORIDE 125 ML/HR: 600; 310; 30; 20 INJECTION, SOLUTION INTRAVENOUS at 06:28

## 2021-09-15 RX ADMIN — TRANEXAMIC ACID 1 G: 10 INJECTION, SOLUTION INTRAVENOUS at 07:50

## 2021-09-15 RX ADMIN — PHENYLEPHRINE HYDROCHLORIDE 100 MCG: 10 INJECTION INTRAVENOUS at 08:44

## 2021-09-15 RX ADMIN — FENTANYL CITRATE 25 MCG: 50 INJECTION INTRAMUSCULAR; INTRAVENOUS at 10:30

## 2021-09-15 RX ADMIN — HYDROMORPHONE HYDROCHLORIDE 0.5 MG: 1 INJECTION, SOLUTION INTRAMUSCULAR; INTRAVENOUS; SUBCUTANEOUS at 09:42

## 2021-09-15 RX ADMIN — LIDOCAINE HYDROCHLORIDE 60 MG: 20 INJECTION, SOLUTION INTRAVENOUS at 07:41

## 2021-09-15 RX ADMIN — Medication 2 MG: at 09:12

## 2021-09-15 RX ADMIN — FENTANYL CITRATE 25 MCG: 50 INJECTION, SOLUTION INTRAMUSCULAR; INTRAVENOUS at 09:20

## 2021-09-15 RX ADMIN — HYDROMORPHONE HYDROCHLORIDE 0.2 MG: 1 INJECTION, SOLUTION INTRAMUSCULAR; INTRAVENOUS; SUBCUTANEOUS at 10:40

## 2021-09-15 RX ADMIN — ONDANSETRON HYDROCHLORIDE 4 MG: 2 INJECTION INTRAMUSCULAR; INTRAVENOUS at 09:00

## 2021-09-15 RX ADMIN — FENTANYL CITRATE 50 MCG: 50 INJECTION, SOLUTION INTRAMUSCULAR; INTRAVENOUS at 07:41

## 2021-09-15 RX ADMIN — VANCOMYCIN HYDROCHLORIDE 1000 MG: 1 INJECTION, POWDER, LYOPHILIZED, FOR SOLUTION INTRAVENOUS at 07:02

## 2021-09-15 RX ADMIN — FENTANYL CITRATE 25 MCG: 50 INJECTION INTRAMUSCULAR; INTRAVENOUS at 10:20

## 2021-09-15 RX ADMIN — ROCURONIUM BROMIDE 50 MG: 10 INJECTION, SOLUTION INTRAVENOUS at 07:41

## 2021-09-15 RX ADMIN — GLYCOPYRROLATE 0.2 MG: 0.2 INJECTION INTRAMUSCULAR; INTRAVENOUS at 07:33

## 2021-09-15 RX ADMIN — HYDROMORPHONE HYDROCHLORIDE 0.5 MG: 1 INJECTION, SOLUTION INTRAMUSCULAR; INTRAVENOUS; SUBCUTANEOUS at 08:05

## 2021-09-15 RX ADMIN — GLYCOPYRROLATE 0.4 MG: 0.2 INJECTION INTRAMUSCULAR; INTRAVENOUS at 09:12

## 2021-09-15 RX ADMIN — FENTANYL CITRATE 25 MCG: 50 INJECTION INTRAMUSCULAR; INTRAVENOUS at 10:11

## 2021-09-15 RX ADMIN — FENTANYL CITRATE 25 MCG: 50 INJECTION, SOLUTION INTRAMUSCULAR; INTRAVENOUS at 09:15

## 2021-09-15 RX ADMIN — SODIUM CHLORIDE, SODIUM LACTATE, POTASSIUM CHLORIDE, AND CALCIUM CHLORIDE: 600; 310; 30; 20 INJECTION, SOLUTION INTRAVENOUS at 09:20

## 2021-09-15 RX ADMIN — PHENYLEPHRINE HYDROCHLORIDE 100 MCG: 10 INJECTION INTRAVENOUS at 08:34

## 2021-09-15 RX ADMIN — KETAMINE HYDROCHLORIDE 30 MG: 10 INJECTION, SOLUTION INTRAMUSCULAR; INTRAVENOUS at 07:48

## 2021-09-15 NOTE — PERIOP NOTES
The reservior to MICHELLE drain was changed out due to unable to empty due to clots (100 ml) in MICHELLE drain Dr. Deuce Pena at the bedside and is aware tubing was milked will continue to monitor output.

## 2021-09-15 NOTE — PERIOP NOTES
Reviewed PTA medication list with patient/caregiver and patient/caregiver denies any additional medications. Patient admits to having a responsible adult care for them at home for at least 24 hours after surgery. Patient encouraged to use gown warming system and informed that using said warming gown to regulate body temperature prior to a procedure has been shown to help reduce the risks of blood clots and infection. Patient's pharmacy of choice verified and documented in PTA medication section. Dual skin assessment & fall risk band verification completed with Corinne Caster RN.

## 2021-09-15 NOTE — DISCHARGE INSTRUCTIONS
DISCHARGE SUMMARY from Nurse    PATIENT INSTRUCTIONS:    After general anesthesia or intravenous sedation, for 24 hours or while taking prescription Narcotics:  · Limit your activities  · Do not drive and operate hazardous machinery  · Do not make important personal or business decisions  · Do  not drink alcoholic beverages  · If you have not urinated within 8 hours after discharge, please contact your surgeon on call. Report the following to your surgeon:  · Excessive pain, swelling, redness or odor of or around the surgical area  · Temperature over 100.5  · Nausea and vomiting lasting longer than 4 hours or if unable to take medications  · Any signs of decreased circulation or nerve impairment to extremity: change in color, persistent  numbness, tingling, coldness or increase pain  · Any questions    What to do at Home:  [address] 2 WEEKS CALL FOR APPT    If you experience any of the following symptoms heavy bleeding, fevers, severe pain, circulation changes, please follow up with dr Gilbert Wills    *  Please give a list of your current medications to your Primary Care Provider. *  Please update this list whenever your medications are discontinued, doses are      changed, or new medications (including over-the-counter products) are added. *  Please carry medication information at all times in case of emergency situations. These are general instructions for a healthy lifestyle:    No smoking/ No tobacco products/ Avoid exposure to second hand smoke  Surgeon General's Warning:  Quitting smoking now greatly reduces serious risk to your health.     Obesity, smoking, and sedentary lifestyle greatly increases your risk for illness    A healthy diet, regular physical exercise & weight monitoring are important for maintaining a healthy lifestyle    You may be retaining fluid if you have a history of heart failure or if you experience any of the following symptoms:  Weight gain of 3 pounds or more overnight or 5 pounds in a week, increased swelling in our hands or feet or shortness of breath while lying flat in bed. Please call your doctor as soon as you notice any of these symptoms; do not wait until your next office visit. The discharge information has been reviewed with the patient and caregiver. The patient and caregiver verbalized understanding. Discharge medications reviewed with the patient and caregiver and appropriate educational materials and side effects teaching were provided. ___________________________________________________________________________________________________________________________________  Dr. Kelley Pereyra    DO NOT take any NSAIDS if you had Fusion Surgery. ACTIVITIES:  *The first week after surgery   1. Change positions every hour while you are awake. Walking is the best way to rebuild strength. 2. Activities around the house, such as washing dishes and preparing light meals are fine. 3. Avoid strenuous activities, such as vacuuming, and do not lift anything heavier than 1 gallon of milk (or about 5-8 pounds). 4. Do not bend over to  items from the ground level until 3 months post-op. *Week 2 and beyond  1. You may gradually increase your activities, but avoid heavy lifting, pushing/pulling. 2. Walk at a pace that avoids fatigue or severe pain. Do not try to walk several blocks the first day! As you increase the distance, you may feel tired. If so, stop and rest.   3. Follow-up with Dr. Kem Westbrook will be 2 weeks after surgery. BATHING and INCISION CARE:  1. The incision may be tender or feel numb: this is normal.   2. Keep the incision clean and dry. You may shower 3 days after surgery. Cover the dressing with saran wrap before getting in the shower. The incision is closed with sutures under the skin and glue on top.    3. Do not apply any lotions, ointments or oils on the incision. 4. Do not remove the dressing. Your dressing will be changed at your first post op appointment. If it comes loose or is damaged, dirty or wet before this appointment, call your home health nurse (if you are being seen by a nurse at home) or the office to have the dressing changed. 5. If you notice any excessive swelling, redness, or persistent drainage around the incision, notify the office immediately. CONSTIPATION:  1. Take a stool softener twice a day while you are taking a narcotic. 2. If you have not had a bowel movement within 3 days of surgery, you will need to use a laxative or suppository that can be obtained over the counter at your local pharmacy     ICE  1. Use ice on your back to decrease pain and swelling. Do NOT use heat. MEDICATIONS:  1. If you had fusion surgery DO NOT TAKE non-steroidal anti-inflammatory (NSAID) medications, such as Motrin, Aleve, Advil Naprosyn, Ibuprofen or aspirin. 2. Take Tylenol/Acetaminophen every 4-6 hours for pain. Do not take more than 4000 mg each day. (Do not take Tylenol/Acetaminophen if you have liver problems). 3. Take your prescribed narcotic pain medication as needed for pain that is not tolerable. 4. Eat food before you take any pain medication to avoid nausea. 5. If you need a medication refill, please call the office during working hours at least 2 days before your prescription runs out. Do not wait until your bottle is empty to call for a refill. NUTRITION:  1. Eat healthy to help your wound to heal.    2. Eat a healthy balanced diet to help your wound to heal. Protein supplements should be considered if you are eating less than 50% of your meal.   3. Drink plenty of water to stay hydrated. DRIVING & RETURN TO WORK:   1. You will be told at your follow up appointment if it is safe for you to drive or return to work and will be provided with a [id number]-note if needed (please ask).    2. NEVER drive while taking narcotic medication. WHEN TO CALL THE OFFICE:   If you have severe pain unrelieved by the medications, new numbness or tingling in your legs;         If you have a fever of 101.0°F or greater    If you notice increased swelling, redness, or increased drainage from the incision     If you are not able to urinate   If you are not able to control your bowels       [address] [place] [address] [address] number is [phone]. They are open from 8:00am to 5:00pm Mon - Fri. After 5:00pm, or on weekends/holidays, please call the answering service at [phone] for a call back.     Patient armband removed and shredded

## 2021-09-15 NOTE — ANESTHESIA PREPROCEDURE EVALUATION
Relevant Problems   RESPIRATORY SYSTEM   (+) Asthma      GASTROINTESTINAL   (+) GERD (gastroesophageal reflux disease)       Anesthetic History   No history of anesthetic complications            Review of Systems / Medical History  Patient summary reviewed, nursing notes reviewed and pertinent labs reviewed    Pulmonary            Asthma        Neuro/Psych       CVA: no residual symptoms  TIA     Cardiovascular                  Exercise tolerance: >4 METS     GI/Hepatic/Renal     GERD           Endo/Other        Arthritis     Other Findings            Physical Exam    Airway  Mallampati: II  TM Distance: 4 - 6 cm  Neck ROM: normal range of motion   Mouth opening: Normal     Cardiovascular  Regular rate and rhythm,  S1 and S2 normal,  no murmur, click, rub, or gallop             Dental  No notable dental hx       Pulmonary  Breath sounds clear to auscultation               Abdominal  GI exam deferred       Other Findings            Anesthetic Plan    ASA: 2  Anesthesia type: general          Induction: Intravenous  Anesthetic plan and risks discussed with: Patient

## 2021-09-15 NOTE — PERIOP NOTES
TRANSFER - IN REPORT:    Verbal report received from ORN & CRNA on Underwood Shamika  being received from OR (unit) for routine post - op      Report consisted of patients Situation, Background, Assessment and   Recommendations(SBAR). Information from the following report(s) SBAR was reviewed with the receiving nurse. Opportunity for questions and clarification was provided. Assessment completed upon patients arrival to unit and care assumed.

## 2021-09-15 NOTE — OP NOTES
[address] [place] [address]   OPERATIVE REPORT    Name:  Mary Warren  MR#:   [id number]  :  1965  ACCOUNT #:  [de-identified]  DATE OF SERVICE:  09/15/2021      PREOPERATIVE DIAGNOSES:  Degenerative spondylolisthesis L4-5, spinal stenosis. POSTOPERATIVE DIAGNOSIS:  Degenerative spondylolisthesis L4-5, spinal stenosis. PROCEDURE PERFORMED:  L4-5 bilateral hemilaminectomy, medial facetectomy, foraminotomy. L4-5 left transforaminal lumbar interbody fusion with expandable cage, Kinzers type. L4-5 posterolateral fusion. L4-5 segmental instrumentation, Erasto type. SURGEON:  Serg Carvajal MD    ASSISTANT:  None. ANESTHESIA:  General endotracheal.    COMPLICATIONS:  None. SPECIMENS REMOVED:  None. IMPLANTS:  Kinzers cage and pedicle screw system. A MICHELLE was placed at the conclusion of the case. ESTIMATED BLOOD LOSS:  Minimal.    FINDINGS:  The patient had severe lateral recess foraminal stenosis and disk space collapse and instability. We were able to restore disk space height, segmental lordosis, and decompress her. Fixation was good. OPERATION:  Following induction of general endotracheal anesthesia, the patient was turned to prone position on the spinal frame. The patient was prepped and draped in usual fashion. Midline incision was made. Paramedian incision was made in the lumbodorsal fascia. Subperiosteal dissection done at L4-5. C-arm image verified with fluoroscopic imaging. Strict hemostasis maintained. Pedicle screws placed in pedicles of L4 and L5 utilizing anatomic landmarks, C-arm image and direct palpation also made with an awl, palpated. Appropriate 6.5 x 45 screws placed with good fixation. Hemilaminectomies were done bilaterally with medial facetectomy and resection of interval ligamentum flavum thoroughly decompressing the interval.  The pedicles were palpable. No evidence of screw misplacement.   Total facetectomy was then done on the left, radical diskectomy done following an annulotomy and endplate preparation with graded mary jane and rongeurs. Disk space was packed with a combination of cancellous autograft and demineralized bone matrix. The Erasto expandable cage was placed, expanded probably to approximately 11 or 12 mm in height which restored the segmental height, normalized lordosis, and resolved the patient's listhesis. Rods were placed within the screw heads and final tightening and torquing done, the neural elements without evidence of compression. No evidence of bleeding. The intertransverse region especially on the patient's right was decorticated and grafted with a combination of autograft and demineralized bone matrix. Vancomycin powder instilled for infection prophylaxis. A deep drain placed. The lumbodorsal fascia was closed with #1 Vicryl. Subcutaneous tissues closed with 2-0 Vicryl and skin closed with a 3-0 Monocryl subcuticular suture and Dermabond. Sterile occlusive dressing placed upon the wound. All counts were correct.       Purvi Chambers MD      [id number]  D:  09/15/2021 9:47  T:  09/15/2021 12:30  JOB #:  [id number]

## 2021-09-15 NOTE — PERIOP NOTES
Called the floor to inquire as to how long it will be before room available, patient states I want to eat something and go home. The floor nurse said she would call back shortly nurse is unavailable.

## 2021-09-15 NOTE — PERIOP NOTES
Jaimee Encinas called and was told Room 212 is her room assignment but they have no nurse available to read SBAR, will call back later.

## 2021-09-15 NOTE — PROGRESS NOTES
Problem: Self Care Deficits Care Plan (Adult)  Goal: *Acute Goals and Plan of Care (Insert Text)  Description: Initial Occupational Therapy Goals (9/15/2021) Within 7 day(s):    1. Patient will perform toilet transfer with supervision in preparation for bowel and bladder management. 2. Patient will perform bowel and bladder management with supervision for increased independence with ADLs. 3. Patient will perform UB dressing with supervision for increased independence with ADLs. 4. Patient will perform LB dressing with supervision & A/E PRN for increased independence with ADLs. 5. Patient will adhere to back/spinal precautions 100% of the time with 1-2 verbal cues for increased independence with ADLs. 6. Patient will utilize energy conservation techniques with 1 verbal cue(s) for increased independence with ADLs. Outcome: Progressing Towards Goal  OCCUPATIONAL THERAPY EVALUATION    Patient: Tyra Olson (52 y.o. female)  Date: 9/15/2021  Primary Diagnosis: Spondylolisthesis at L4-L5 level [M43.16]  Procedure(s) (LRB):  LUMBAR FOUR/FIVE LAMINECTOMY FUSION, TRANSFORAMINAL LUMBAR INTERBODY FUSION ON LEFT WITH C-ARM (N/A) Day of Surgery   Precautions: Fall, Back, Spinal  PLOF: pt mod I for ADLs/functional mobility    ASSESSMENT AND RECOMMENDATIONS:  Based on the objective data described below, the patient presents with BLE decreased ROM and strength affecting LE ADLs. Pt found seated in chair, reporting pain 5/10, agreeable to therapy, pt drowsy throughout session. Educated pt on back precautions including log rolling technique for bed mobility. Instructed pt on adaptive strategies for lower body ADLs and clothing modifications for increased independence. Pt SBA for upper body dressing. Pt was able to thread B feet through underwear/pants with min A, and CGA when standing to pull up to waist. Pt CGA for STS and to ambulate short functional household distance with RW, and vc for safe body mechanics.  Pt ambulated back to recliner chair. Provided opportunity for pt to voice questions on ADL performance when home, pt has no further concerns. Spouse present during session for education on home safety. Patient will benefit from skilled Occupational Therapy intervention to maximize safety/independence with ADLs at d/c.    Education: Reviewed Back Precautions, body mechanics, home safety, importance of moving every hour to assist w/ stiffness & spasms, adaptive strategies and adaptive dressing techniques including clothing modifications with patient verbalizing understanding at this time. Patient will benefit from skilled intervention to address the above impairments. Patient's rehabilitation potential is considered to be Good  Factors which may influence rehabilitation potential include:   []             None noted  []             Mental ability/status  []             Medical condition  []             Home/family situation and support systems  []             Safety awareness  [x]             Pain tolerance/management  []             Other:        PLAN :  Recommendations and Planned Interventions:   [x]               Self Care Training                  [x]      Therapeutic Activities  [x]               Functional Mobility Training   []      Cognitive Retraining  []               Therapeutic Exercises           []      Endurance Activities  []               Balance Training                    []      Neuromuscular Re-Education  []               Visual/Perceptual Training     [x]      Home Safety Training  [x]               Patient Education                   [x]      Family Training/Education  []               Other (comment):    Frequency/Duration: Patient will be followed by occupational therapy 1-2 times per day/4-7 days per week to address goals.   Discharge Recommendations: Home Health with responsible adult care at least 24 hours upon hospital d/c  Further Equipment Recommendations for Discharge: N/A SUBJECTIVE:   Patient stated i'm so sleepy.     OBJECTIVE DATA SUMMARY:     Past Medical History:   Diagnosis Date    Appetite loss 2013    Arthritis     in back    Asthma 2006    Back pain 8/31/2012    Chest pain     Degenerative disc disease, lumbar     DUB (dysfunctional uterine bleeding)     Foot pain, left     GERD (gastroesophageal reflux disease)     Headache(784.0)     migraine variant    Irregular heartbeat     Kidney calculus     Kidney stone     Menorrhagia     Pelvic pain in female     Postlaminectomy syndrome     S/P ankle ligament repair, left 2/7/2014    Stroke ([facility] [address] 1988, 1996, 1998    x3 - no residual    Tobacco abuse     Trichomonas     UTI (lower urinary tract infection)     Wears glasses 2007     Past Surgical History:   Procedure Laterality Date    HX LUMBAR FUSION  2012    L4-L5    HX ORTHOPAEDIC Left 02/07/2014    ligament reconstruction    HX TONSILLECTOMY      HX TUBAL LIGATION  1996    x2     Barriers to Learning/Limitations: yes;  physical and altered mental status (i.e.Sedation, Confusion)  Compensate with: visual, verbal, tactile, kinesthetic cues/model    Home Situation/Prior Level of Function:   Home Situation  Home Environment: Apartment  # Steps to Enter: 15  Rails to Enter: Yes  Hand Rails : Right  One/Two Story Residence: One story  Living Alone: No  Support Systems: Spouse/Significant Other  Patient Expects to be Discharged to[de-identified] Apartment  Current DME Used/Available at Home: Cane, straight, Brace/Splint, Walker, rolling  Tub or Shower Type: Tub/Shower combination  []  Right hand dominant   []  Left hand dominant    Cognitive/Behavioral Status:  Neurologic State: Drowsy; Alert  Orientation Level: Oriented to person;Oriented to place;Oriented to situation  Cognition: Follows commands  Safety/Judgement: Awareness of environment    Skin: lumbar incision w/ dressing/Mepilex     Coordination: BUE  Coordination: Within functional limits  Fine Motor Skills-Upper: Left Intact; Right Intact    Gross Motor Skills-Upper: Left Intact; Right Intact    Balance:  Sitting: Intact  Standing: Intact; With support    Strength: BUE  Strength: Generally decreased, functional    Tone & Sensation:BUE  Tone: Normal  Sensation: Intact    Range of Motion: BUE  AROM: Generally decreased, functional    Functional Mobility and Transfers for ADLs:  Bed Mobility:  Scooting: Stand-by assistance  Transfers:  Sit to Stand: Contact guard assistance (vc)    ADL Assessment:  Feeding: Independent  Oral Facial Hygiene/Grooming: Contact guard assistance  Bathing: Moderate assistance  Upper Body Dressing: Stand-by assistance  Lower Body Dressing: Minimum assistance  Toileting: Contact guard assistance    ADL Intervention:  Upper Body Dressing Assistance  Dressing Assistance: Stand-by assistance  Bra: Stand-by assistance  Pullover Shirt: Stand-by assistance    Lower Body Dressing Assistance  Dressing Assistance: Minimum assistance  Underpants: Minimum assistance  Pants With Elastic Waist: Minimum assistance  Socks: Minimum assistance  Shoes with Cloth Laces: Maximum assistance  Leg Crossed Method Used: Yes  Position Performed: Seated in chair  Cues: Verbal cues provided;Visual cues provided    Cognitive Retraining  Safety/Judgement: Awareness of environment    Pain:  Pain level pre-treatment: 5/10  Pain level post-treatment: 5/10  Pain Intervention(s): Rest, Ice, Repositioning   Response to intervention: Nurse notified, see doc flow sheet    Activity Tolerance:   Fair. Patient able to stand ~5 minute(s). Patient able to complete ADLs with intermittent rest breaks. Patient limited by pain, strength, ROM, drowsiness. Patient unsteady. Please refer to the flowsheet for vital signs taken during this treatment.   After treatment:   [x]  Patient left in no apparent distress sitting up in chair  []  Patient sitting on EOB  []  Patient left in no apparent distress in bed  [x]  Call bell left within reach  [x]  Nursing notified  [x] Caregiver present  []  Ice applied  []  SCD's on while back in bed  [] Bed alarm activated    COMMUNICATION/EDUCATION:   Communication/Collaboration:  [x]       Role of Occupational Therapy in the acute care setting. [x]      Home safety education was provided and the patient/caregiver indicated understanding. [x]      Patient/family have participated as able in goal setting and plan of care. [x]      Patient/family agree to work toward stated goals and plan of care. []      Patient understands intent and goals of therapy, but is neutral about his/her participation. []      Patient is unable to participate in plan of care at this time. Thank you for this referral.  Kleber Bingham, OTR/L  Time Calculation: 32 mins    Eval Complexity: History: MEDIUM Complexity : Expanded review of history including physical, cognitive and psychosocial  history ; Examination: MEDIUM Complexity : 3-5 performance deficits relating to physical, cognitive , or psychosocial skils that result in activity limitations and / or participation restrictions; Decision Making:MEDIUM Complexity : Patient may present with comorbidities that affect occupational performnce.  Miniml to moderate modification of tasks or assistance (eg, physical or verbal ) with assesment(s) is necessary to enable patient to complete evaluation

## 2021-09-15 NOTE — PERIOP NOTES
Waiting for floor nurse to call back patient is resting quietly complains of feeling thirsty and hungry, explained that we are waiting to call report and take her over to the room.

## 2021-09-15 NOTE — PROGRESS NOTES
Problem: Mobility Impaired (Adult and Pediatric)  Goal: *Acute Goals and Plan of Care (Insert Text)  Description: In 1 day pt will be able to:  Transfer supine<>sidelying<>sit using log roll technique to adhere to back precautions CGA/SBA. Transfer sit<>stand w/ CGA to improve functional independence. Gait w/ RW, LSO, GB, and CGA x100' to improve mobility in environment. Perform stairs using B rail, step to pattern and CGA to gain entry into home/inside home environment. Ed pt on back precautions and safe mobility. Note: [x]  Patient has met MD wisam pereira for d/c home   [x]  Recommend HH with 24 hour adult care   []  Benefit from additional acute PT session to address:      PHYSICAL THERAPY EVALUATION    Patient: Shannan Larsen (69 y.o. female)  Date: 9/15/2021  Primary Diagnosis: Spondylolisthesis at L4-L5 level [M43.16]  Procedure(s) (LRB):  LUMBAR FOUR/FIVE LAMINECTOMY FUSION, TRANSFORAMINAL LUMBAR INTERBODY FUSION ON LEFT WITH C-ARM (N/A) Day of Surgery   Precautions:   Fall, Back, Spinal    PLOF: Independent    ASSESSMENT :  Based on the objective data described below, the patient presents with decreased mobility in regards to bed mobility, transfers, gt quality and tolerance, balance, stair negotiation and safety due to back surgery. Decreased generalized strength, pain in back, dizziness c/o also impacting pt functional mobility. Pt rating pain on numerical pain scale pre/post and during session 5/10. Pt sitting in recliner upon arrival.  Pt and  ed regarding mobility safety, back precautions, wearing of LSO, environmental safety and home safe techniques. Pt able to perform sit<>stand w/ CGA. Safety vc required throughout session to reinforce safety. Pt able to participate in gt training using RW, GB and CGA w/ antalgic gt pattern. Pt was able to participate in stair training using step to pattern, B rails and CGA.   Answered questions by pt and  in regards to PT and mobility. Pt left sitting in recliner w/ all needs within reach. Nurse Joanna Castellon aware of session and outcomes. Recommend HHPT with responsible adult care at least 24 hours upon hospital d/c. Patient will benefit from skilled intervention to address the above impairments. Patient's rehabilitation potential is considered to be Good  Factors which may influence rehabilitation potential include:   []         None noted  []         Mental ability/status  []         Medical condition  []         Home/family situation and support systems  []         Safety awareness  [x]         Pain tolerance/management  []         Other:      PLAN :  Recommendations and Planned Interventions:   [x]           Bed Mobility Training             []    Neuromuscular Re-Education  [x]           Transfer Training                   []    Orthotic/Prosthetic Training  [x]           Gait Training                          []    Modalities  []           Therapeutic Exercises           []    Edema Management/Control  [x]           Therapeutic Activities            [x]    Family Training/Education  [x]           Patient Education  []           Other (comment):    Frequency/Duration: Patient will be followed by physical therapy 1-2 times per day/4-7 days per week to address goals. Discharge Recommendations: Home Health  Further Equipment Recommendations for Discharge: N/A     SUBJECTIVE:   Patient stated I am very sleepy but I want to get home.     OBJECTIVE DATA SUMMARY:     Past Medical History:   Diagnosis Date    Appetite loss 2013    Arthritis     in back    Asthma 2006    Back pain 8/31/2012    Chest pain     Degenerative disc disease, lumbar     DUB (dysfunctional uterine bleeding)     Foot pain, left     GERD (gastroesophageal reflux disease)     Headache(784.0)     migraine variant    Irregular heartbeat     Kidney calculus     Kidney stone     Menorrhagia     Pelvic pain in female     Postlaminectomy syndrome     S/P ankle ligament repair, left 2/7/2014    Stroke ([facility] [address] 1988, 1996, 1998    x3 - no residual    Tobacco abuse     Trichomonas     UTI (lower urinary tract infection)     Wears glasses 2007     Past Surgical History:   Procedure Laterality Date    HX LUMBAR FUSION  2012    L4-L5    HX ORTHOPAEDIC Left 02/07/2014    ligament reconstruction    HX TONSILLECTOMY      HX TUBAL LIGATION  1996    x2     Barriers to Learning/Limitations: yes;  anesthesia  Compensate with: Visual Cues, Verbal Cues, and Tactile Cues  Home Situation:  Home Situation  Home Environment: Apartment  # Steps to Enter: 15  Rails to Enter: Yes  Hand Rails : Right  One/Two Story Residence: One story  Living Alone: No  Support Systems: Spouse/Significant Other  Patient Expects to be Discharged to[de-identified] Apartment  Current DME Used/Available at Home: Cane, straight, Brace/Splint, Walker, rolling  Tub or Shower Type: Tub/Shower combination  Critical Behavior:  Neurologic State: Drowsy; Alert  Orientation Level: Oriented to person;Oriented to place;Oriented to situation  Cognition: Follows commands  Safety/Judgement: Awareness of environment  Psychosocial  Patient Behaviors: Calm; Cooperative  Family  Behaviors: Supportive;Calm  Skin Condition/Temp: Dry;Warm  Family  Behaviors: Supportive;Calm  Skin Integrity: Incision (comment); Tattoos (comment) (back)  Skin Integumentary  Skin Color: Appropriate for ethnicity  Skin Condition/Temp: Dry;Warm  Skin Integrity: Incision (comment); Tattoos (comment) (back)  Turgor: Non-tenting  Strength:    Strength: Generally decreased, functional  Tone & Sensation:   Tone: Normal  Sensation: Intact  Range Of Motion:  AROM: Generally decreased, functional  Functional Mobility:  Bed Mobility:  Scooting: Stand-by assistance  Transfers:  Sit to Stand: Contact guard assistance (vc)  Stand to Sit: Contact guard assistance (vc)  Balance:   Sitting: Intact  Standing: Intact; With support  Ambulation/Gait Training:  Distance (ft): 50 Feet (ft)  Assistive Device: Walker, rolling;Gait belt;Brace/Splint  Ambulation - Level of Assistance: Contact guard assistance (vc)  Gait Abnormalities: Antalgic;Decreased step clearance  Base of Support: Narrowed  Stance: Weight shift;Time  Speed/Karo: Slow  Step Length: Left shortened;Right shortened  Swing Pattern: Left asymmetrical;Right asymmetrical  Interventions: Safety awareness training; Tactile cues; Verbal cues; Visual/Demos  Stairs:  Number of Stairs Trained: 1  Stairs - Level of Assistance: Contact guard assistance (vc)  Rail Use: Both     Therapeutic Exercises:   Pain:  Pain level pre-treatment: 5/10   Pain level post-treatment: 5/10   Pain Intervention(s) : Medication (see MAR); Rest, Ice, Repositioning  Response to intervention: Nurse notified, See doc flow    Activity Tolerance:   Fair   Please refer to the flowsheet for vital signs taken during this treatment. After treatment:   [x]         Patient left in no apparent distress sitting up in chair  []         Patient left in no apparent distress in bed  [x]         Call bell left within reach  [x]         Nursing notified  [x]         Caregiver present  []         Bed alarm activated  []         SCDs applied    COMMUNICATION/EDUCATION:   [x]         Role of Physical Therapy in the acute care setting. [x]         Fall prevention education was provided and the patient/caregiver indicated understanding. [x]         Patient/family have participated as able in goal setting and plan of care. [x]         Patient/family agree to work toward stated goals and plan of care. []         Patient understands intent and goals of therapy, but is neutral about his/her participation. []         Patient is unable to participate in goal setting/plan of care: ongoing with therapy staff.  []         Other:     Thank you for this referral.  Roslyn Galvan, PT   Time Calculation: 35 mins      Eval Complexity: History: HIGH Complexity :3+ comorbidities / personal factors will impact the outcome/ POC Exam:MEDIUM Complexity : 3 Standardized tests and measures addressing body structure, function, activity limitation and / or participation in recreation  Presentation: LOW Complexity : Stable, uncomplicated  Clinical Decision Making:Low Complexity    Overall Complexity:LOW

## 2021-09-15 NOTE — INTERVAL H&P NOTE
Update History & Physical    The Patient's History and Physical of September 14, 2021 was reviewed with the patient and I examined the patient. There was no change. The surgical site was confirmed by the patient and me. Plan:  The risk, benefits, expected outcome, and alternative to the recommended procedure have been discussed with the patient. Patient understands and wants to proceed with the procedure.     Electronically signed by Luisito Borrero MD on 9/15/2021 at 6:54 AM

## 2021-09-15 NOTE — ANESTHESIA POSTPROCEDURE EVALUATION
Procedure(s):  LUMBAR FOUR/FIVE LAMINECTOMY FUSION, TRANSFORAMINAL LUMBAR INTERBODY FUSION ON LEFT WITH C-ARM. general    Anesthesia Post Evaluation      Multimodal analgesia: multimodal analgesia used between 6 hours prior to anesthesia start to PACU discharge  Patient location during evaluation: PACU  Patient participation: complete - patient participated  Level of consciousness: awake  Pain management: adequate  Airway patency: patent  Anesthetic complications: no  Cardiovascular status: acceptable  Respiratory status: acceptable  Hydration status: acceptable  Post anesthesia nausea and vomiting:  none  Final Post Anesthesia Temperature Assessment:  Normothermia (36.0-37.5 degrees C)      INITIAL Post-op Vital signs:   Vitals Value Taken Time   /72 09/15/21 1042   Temp 36.5 °C (97.7 °F) 09/15/21 0945   Pulse 92 09/15/21 1045   Resp 12 09/15/21 1045   SpO2 96 % 09/15/21 1045   Vitals shown include unvalidated device data.

## 2021-09-15 NOTE — PERIOP NOTES
Patient opens eyes to verbal stimuli, asking for water. Vital signs with in limit and pain well controlled checked dressing to lower back small amount of bloody drainage noted to top corner of dressing. Patient is moving all extremities able to wiggle toes and dorsi/plantar flexion bilat feet noted.

## 2021-09-15 NOTE — BRIEF OP NOTE
Brief Postoperative Note    Patient: Mohit Mccartney  YOB: 1965  MRN: [id number]    Date of Procedure: 9/15/2021     Pre-Op Diagnosis: HNP, STENOSIS, LUMBAR    Post-Op Diagnosis: Same as preoperative diagnosis. Procedure(s):  LUMBAR FOUR/FIVE LAMINECTOMY FUSION, TRANSFORAMINAL LUMBAR INTERBODY FUSION ON LEFT WITH C-ARM    Surgeon(s):  Chris Be MD    Surgical Assistant: Surg Asst-1: Cassius Austin    Anesthesia: General     Estimated Blood Loss (mL): Minimal    Complications: None    Specimens: * No specimens in log *     Implants:   Implant Name Type Inv. Item Serial No.  Lot No. LRB No. Used Action   GRAFT BNE SUB 10CC TRICALCIUM PHSPTE FOAM FLOW INJ VITOSS - [id number]  GRAFT BNE SUB 10CC TRICALCIUM PHSPTE FOAM FLOW INJ VITOSS  [employer]WD [id number] N/A 1 Implanted   VESUVIUS DBM PUTTY   [id number] [place] [employer]_WD  N/A 1 Implanted   PROLIFT EXPANDABLE SPACER SYSTEM    TOÑA HERNÁNN[employer]_WD [id number] N/A 1 Implanted   SCREW SPNL L45MM DIA6. 5MM PEDCL POLYAX 2 LD THRD TOP LD FOR - [id number]  SCREW SPNL L45MM DIA6. 5MM PEDCL POLYAX 2 LD THRD TOP LD FOR  [employer]WD [id number] N/A 4 Implanted   SUMMER SPNL L45MM DIA5. 5MM POST TI LUM CNTOUR BK SMOOTH ZHOU - [id number]  SUMMER SPNL L45MM DIA5. 5MM POST TI LUM CNTOUR BK SMOOTH ZHOU  [employer]WD [id number] N/A 2 Implanted   SET SCR SPNL POLYAX ATR EVEREST - [id number]  SET SCR SPNL POLYAX ATR EVEREST  [employer]WD [id number] N/A 4 Implanted       Drains:   Jeff[place]-Jean-Baptiste Drain 09/15/21 Left; Lower Back (Active)   Site Assessment Clean, dry, & intact 09/15/21 0912   Dressing Status Clean, dry, & intact 09/15/21 0912   Status Patent; Charged 09/15/21 0912   Drainage Color Serosanguinous 09/15/21 0912       Findings: fair bone    Electronically Signed by Janie Iniguez MD on 9/15/2021 at 9:20 AM

## 2021-09-15 NOTE — PERIOP NOTES
Dr. Nestor Roy at the bedside and was asked for an anesthesia out note patient stating pain currently 2/10, resting quietly and vital signs with in limit, MICHELLE drain emptied for 20 ML.

## 2021-09-24 ENCOUNTER — HOSPITAL ENCOUNTER (EMERGENCY)
Age: 56
Discharge: HOME | End: 2021-09-24
Attending: EMERGENCY MEDICINE
Payer: MEDICAID

## 2021-09-24 VITALS
TEMPERATURE: 98.2 F | DIASTOLIC BLOOD PRESSURE: 98 MMHG | OXYGEN SATURATION: 97 % | WEIGHT: 140 LBS | SYSTOLIC BLOOD PRESSURE: 158 MMHG | RESPIRATION RATE: 16 BRPM | BODY MASS INDEX: 23.32 KG/M2 | HEART RATE: 88 BPM | HEIGHT: 65 IN

## 2021-09-24 DIAGNOSIS — Z48.89 ENCOUNTER FOR POSTOPERATIVE WOUND CHECK: Primary | ICD-10-CM

## 2021-09-24 LAB
ALBUMIN SERPL-MCNC: 3.4 G/DL (ref 3.4–5)
ALBUMIN/GLOB SERPL: 0.9 {RATIO} (ref 0.8–1.7)
ALP SERPL-CCNC: 119 U/L (ref 45–117)
ALT SERPL-CCNC: 41 U/L (ref 13–56)
ANION GAP SERPL CALC-SCNC: 7 MMOL/L (ref 3–18)
AST SERPL-CCNC: 29 U/L (ref 10–38)
BASOPHILS # BLD: 0 K/UL (ref 0–0.1)
BASOPHILS NFR BLD: 0 % (ref 0–2)
BILIRUB SERPL-MCNC: 0.3 MG/DL (ref 0.2–1)
BUN SERPL-MCNC: 18 MG/DL (ref 7–18)
BUN/CREAT SERPL: 25 (ref 12–20)
CALCIUM SERPL-MCNC: 8.9 MG/DL (ref 8.5–10.1)
CHLORIDE SERPL-SCNC: 106 MMOL/L (ref 100–111)
CO2 SERPL-SCNC: 29 MMOL/L (ref 21–32)
CREAT SERPL-MCNC: 0.71 MG/DL (ref 0.6–1.3)
DIFFERENTIAL METHOD BLD: NORMAL
EOSINOPHIL # BLD: 0.2 K/UL (ref 0–0.4)
EOSINOPHIL NFR BLD: 2 % (ref 0–5)
ERYTHROCYTE [DISTWIDTH] IN BLOOD BY AUTOMATED COUNT: 13.6 % (ref 11.6–14.5)
GLOBULIN SER CALC-MCNC: 3.8 G/DL (ref 2–4)
GLUCOSE SERPL-MCNC: 115 MG/DL (ref 74–99)
HCT VFR BLD AUTO: 39.2 % (ref 35–45)
HGB BLD-MCNC: 13 G/DL (ref 12–16)
LYMPHOCYTES # BLD: 2.4 K/UL (ref 0.9–3.6)
LYMPHOCYTES NFR BLD: 26 % (ref 21–52)
MAGNESIUM SERPL-MCNC: 2.1 MG/DL (ref 1.6–2.6)
MCH RBC QN AUTO: 30.4 PG (ref 24–34)
MCHC RBC AUTO-ENTMCNC: 33.2 G/DL (ref 31–37)
MCV RBC AUTO: 91.6 FL (ref 78–100)
MONOCYTES # BLD: 0.7 K/UL (ref 0.05–1.2)
MONOCYTES NFR BLD: 8 % (ref 3–10)
NEUTS SEG # BLD: 5.9 K/UL (ref 1.8–8)
NEUTS SEG NFR BLD: 63 % (ref 40–73)
PLATELET # BLD AUTO: 320 K/UL (ref 135–420)
PMV BLD AUTO: 9.6 FL (ref 9.2–11.8)
POTASSIUM SERPL-SCNC: 3.4 MMOL/L (ref 3.5–5.5)
PROT SERPL-MCNC: 7.2 G/DL (ref 6.4–8.2)
RBC # BLD AUTO: 4.28 M/UL (ref 4.2–5.3)
SODIUM SERPL-SCNC: 142 MMOL/L (ref 136–145)
WBC # BLD AUTO: 9.3 K/UL (ref 4.6–13.2)

## 2021-09-24 PROCEDURE — 99283 EMERGENCY DEPT VISIT LOW MDM: CPT

## 2021-09-24 PROCEDURE — 80053 COMPREHEN METABOLIC PANEL: CPT

## 2021-09-24 PROCEDURE — 83735 ASSAY OF MAGNESIUM: CPT

## 2021-09-24 PROCEDURE — 85025 COMPLETE CBC W/AUTO DIFF WBC: CPT

## 2021-09-24 RX ORDER — OXYCODONE HYDROCHLORIDE 5 MG/1
5 TABLET ORAL
Status: ON HOLD | COMMUNITY
End: 2022-07-17

## 2021-09-24 RX ORDER — OXYCODONE AND ACETAMINOPHEN 5; 325 MG/1; MG/1
1 TABLET ORAL
Qty: 15 TABLET | Refills: 0 | Status: SHIPPED | OUTPATIENT
Start: 2021-09-24 | End: 2021-09-29

## 2021-09-24 NOTE — ED TRIAGE NOTES
Pt here for evaluation of post op bleeding, increased pain, and states sutures \"possibly broke\". States she had spinal fusion per Dr Moises Stoll on 9/15/21. Has follow up appt on 9/29.

## 2021-09-25 NOTE — ED PROVIDER NOTES
HPI Patrient is a 60 yo female who had lower back surgery 9 days ago. She presents to the ER for a wound check. She states she had mild bleeding from the wound site. No other complaints.     Past Medical History:   Diagnosis Date    Appetite loss     Arthritis     in back    Asthma 2006    Back pain 2012    Chest pain     Degenerative disc disease, lumbar     DUB (dysfunctional uterine bleeding)     Foot pain, left     GERD (gastroesophageal reflux disease)     Headache(784.0)     migraine variant    Irregular heartbeat     Kidney calculus     Kidney stone     Menorrhagia     Pelvic pain in female     Postlaminectomy syndrome     S/P ankle ligament repair, left 2014    Stroke ([facility] [address] , , 1998    x3 - no residual    Tobacco abuse     Trichomonas     UTI (lower urinary tract infection)     Wears glasses        Past Surgical History:   Procedure Laterality Date    HX LUMBAR FUSION      L4-L5    HX LUMBAR FUSION  09/15/2021    HX ORTHOPAEDIC Left 2014    ligament reconstruction    HX TONSILLECTOMY      HX TUBAL LIGATION  1996    x2         Family History:   Problem Relation Age of Onset    Heart Disease Mother     Hypertension Mother     Stroke Mother     Hypertension Father     Hypertension Sister     Diabetes Sister        Social History     Socioeconomic History    Marital status:      Spouse name: Not on file    Number of children: Not on file    Years of education: Not on file    Highest education level: Not on file   Occupational History    Not on file   Tobacco Use    Smoking status: Current Some Day Smoker     Packs/day: 0.00     Years: 33.00     Pack years: 0.00     Last attempt to quit: 2013     Years since quittin.8    Smokeless tobacco: Never Used    Tobacco comment: no smoking within 24 hours of [employer] Vaping Use: Never used   Substance and Sexual Activity    Alcohol use: No     Alcohol/week: 0.0 standard drinks    Drug use: No    Sexual activity: Yes     Partners: Male     Birth control/protection: Surgical   Other Topics Concern    Not on file   Social History Narrative    Not on file     Social Determinants of Health     Financial Resource Strain:     Difficulty of Paying Living Expenses:    Food Insecurity:     Worried About Running Out of Food in the Last Year:     [address] in the Last Year:    Transportation Needs:     Lack of Transportation (Medical):  Lack of Transportation (Non-Medical):    Physical Activity:     Days of Exercise per Week:     Minutes of Exercise per Session:    Stress:     Feeling of Stress :    Social Connections:     Frequency of Communication with Friends and Family:     Frequency of Social Gatherings with Friends and Family:     Attends Denominational Services:     Active Member of Clubs or Organizations:     Attends Club or Organization Meetings:     Marital Status:    Intimate Partner Violence:     Fear of Current or Ex-Partner:     Emotionally Abused:     Physically Abused:     Sexually Abused: ALLERGIES: Meloxicam, Nortriptyline, and Penicillins    Review of Systems   Constitutional: Negative. HENT: Negative. Eyes: Negative. Respiratory: Negative. Cardiovascular: Negative. Gastrointestinal: Negative. Endocrine: Negative. Genitourinary: Negative. Musculoskeletal: Positive for back pain (post op pain). Skin: Negative. Allergic/Immunologic: Negative. Neurological: Negative. Hematological: Negative. Psychiatric/Behavioral: Negative. All other systems reviewed and are negative. Vitals:    09/24/21 1612   BP: (!) 170/101   Pulse: 96   Resp: 18   Temp: 98.2 °F (36.8 °C)   SpO2: 97%   Weight: 63.5 kg (140 lb)   Height: 5' 5\" (1.651 m)            Physical Exam  Vitals and nursing note reviewed. Constitutional:       General: She is not in acute distress. Appearance: She is well-developed.  She is not diaphoretic. HENT:      Head: Normocephalic. Right Ear: External ear normal.      Left Ear: External ear normal.      Mouth/Throat:      Pharynx: No oropharyngeal exudate. Eyes:      General: No scleral icterus. Right eye: No discharge. Left eye: No discharge. Conjunctiva/sclera: Conjunctivae normal.      Pupils: Pupils are equal, round, and reactive to light. Neck:      Thyroid: No thyromegaly. Vascular: No JVD. Trachea: No tracheal deviation. Cardiovascular:      Rate and Rhythm: Normal rate and regular rhythm. Heart sounds: Normal heart sounds. No murmur heard. No friction rub. No gallop. Pulmonary:      Effort: Pulmonary effort is normal. No respiratory distress. Breath sounds: Normal breath sounds. No stridor. No wheezing or rales. Chest:      Chest wall: No tenderness. Abdominal:      General: Bowel sounds are normal. There is no distension. Palpations: Abdomen is soft. There is no mass. Tenderness: There is no abdominal tenderness. There is no guarding or rebound. Musculoskeletal:         General: No tenderness. Normal range of motion. Cervical back: Normal range of motion and neck supple. Comments: Back: dressing intact, no bleeding from wound site, no erythema, edema or severe pain with palpation of area. Lymphadenopathy:      Cervical: No cervical adenopathy. Skin:     General: Skin is warm and dry. Coloration: Skin is not pale. Findings: No erythema or rash. Neurological:      Mental Status: She is alert and oriented to person, place, and time. Cranial Nerves: No cranial nerve deficit. Motor: No abnormal muscle tone.       Coordination: Coordination normal.      Deep Tendon Reflexes: Reflexes normal.          MDM  Number of Diagnoses or Management Options  Risk of Complications, Morbidity, and/or Mortality  Presenting problems: low  Diagnostic procedures: low  Management options: low Procedures    Dressing change    Dx:post op  wound check    Disp: D/C home. F/U with Dr. Belle Causey in 3 days. Continue current post-op wound care. Return to ER prn. Dictation disclaimer:  Please note that this dictation was completed with [employer], the computer voice recognition software. Quite often unanticipated grammatical, syntax, homophones, and other interpretive errors are inadvertently transcribed by the computer software. Please disregard these errors. Please excuse any errors that have escaped final proofreading.

## 2021-09-25 NOTE — ED NOTES
Alert and oriented female s/p spinal surgery on 9/15. C/O post op pain and bleeding from incision site. Incision site to lower back with dressing intact. Dried blood noted on dressing. No signs of dehiscence noted. No other drainage noted. Patient states prescribed pain medication is not controlling her pain.

## 2021-10-25 ENCOUNTER — HOSPITAL ENCOUNTER (OUTPATIENT)
Dept: GENERAL RADIOLOGY | Age: 56
Discharge: HOME | End: 2021-10-25
Payer: MEDICAID

## 2021-10-25 DIAGNOSIS — Z98.1 S/P LUMBAR SPINAL FUSION: ICD-10-CM

## 2021-10-25 PROCEDURE — 72110 X-RAY EXAM L-2 SPINE 4/>VWS: CPT

## 2021-11-11 ENCOUNTER — TRANSCRIBE ORDER (OUTPATIENT)
Dept: SCHEDULING | Age: 56
End: 2021-11-11

## 2021-11-11 DIAGNOSIS — F17.210 SMOKING GREATER THAN 20 PACK YEARS: Primary | ICD-10-CM

## 2021-11-15 ENCOUNTER — TRANSCRIBE ORDER (OUTPATIENT)
Dept: SCHEDULING | Age: 56
End: 2021-11-15

## 2021-11-15 DIAGNOSIS — F17.210 SMOKING GREATER THAN 20 PACK YEARS: Primary | ICD-10-CM

## 2021-11-16 ENCOUNTER — TRANSCRIBE ORDER (OUTPATIENT)
Dept: SCHEDULING | Age: 56
End: 2021-11-16

## 2021-11-16 DIAGNOSIS — G43.509 PERSISTENT MIGRAINE AURA WITHOUT CEREBRAL INFARCTION AND WITHOUT STATUS MIGRAINOSUS, NOT INTRACTABLE: Primary | ICD-10-CM

## 2021-11-29 ENCOUNTER — NURSE NAVIGATOR (OUTPATIENT)
Dept: OTHER | Age: 56
End: 2021-11-29

## 2021-12-09 ENCOUNTER — HOSPITAL ENCOUNTER (OUTPATIENT)
Dept: CT IMAGING | Age: 56
Discharge: HOME | End: 2021-12-09
Attending: INTERNAL MEDICINE
Payer: MEDICAID

## 2021-12-09 VITALS — BODY MASS INDEX: 24.91 KG/M2 | HEIGHT: 66 IN | WEIGHT: 155 LBS

## 2021-12-09 DIAGNOSIS — F17.210 SMOKING GREATER THAN 20 PACK YEARS: ICD-10-CM

## 2021-12-09 DIAGNOSIS — G43.509 PERSISTENT MIGRAINE AURA WITHOUT CEREBRAL INFARCTION AND WITHOUT STATUS MIGRAINOSUS, NOT INTRACTABLE: ICD-10-CM

## 2021-12-09 PROCEDURE — 74011000636 HC RX REV CODE- 636

## 2021-12-09 PROCEDURE — 71271 CT THORAX LUNG CANCER SCR C-: CPT

## 2021-12-09 PROCEDURE — 70470 CT HEAD/BRAIN W/O & W/DYE: CPT

## 2021-12-09 RX ADMIN — IOPAMIDOL 79 ML: 612 INJECTION, SOLUTION INTRAVENOUS at 11:21

## 2021-12-17 ENCOUNTER — TELEPHONE (OUTPATIENT)
Dept: NEUROLOGY | Age: 56
End: 2021-12-17

## 2022-02-03 ENCOUNTER — OFFICE VISIT (OUTPATIENT)
Dept: NEUROLOGY | Age: 57
End: 2022-02-03
Payer: MEDICAID

## 2022-02-03 VITALS
HEART RATE: 108 BPM | SYSTOLIC BLOOD PRESSURE: 130 MMHG | RESPIRATION RATE: 18 BRPM | BODY MASS INDEX: 24.91 KG/M2 | DIASTOLIC BLOOD PRESSURE: 80 MMHG | HEIGHT: 66 IN | OXYGEN SATURATION: 97 % | WEIGHT: 155 LBS

## 2022-02-03 DIAGNOSIS — R51.9 WORSENING HEADACHES: ICD-10-CM

## 2022-02-03 DIAGNOSIS — G43.011 INTRACTABLE MIGRAINE WITHOUT AURA AND WITH STATUS MIGRAINOSUS: ICD-10-CM

## 2022-02-03 DIAGNOSIS — R51.9 WORSENING HEADACHES: Primary | ICD-10-CM

## 2022-02-03 DIAGNOSIS — Z86.69 HISTORY OF MIGRAINE: ICD-10-CM

## 2022-02-03 DIAGNOSIS — Z86.73 HISTORY OF STROKE: ICD-10-CM

## 2022-02-03 DIAGNOSIS — R42 DIZZINESS: ICD-10-CM

## 2022-02-03 DIAGNOSIS — D32.9 MENINGIOMA (HCC): ICD-10-CM

## 2022-02-03 PROCEDURE — 99204 OFFICE O/P NEW MOD 45 MIN: CPT | Performed by: NURSE PRACTITIONER

## 2022-02-03 RX ORDER — HYDROCHLOROTHIAZIDE 25 MG/1
25 TABLET ORAL DAILY
COMMUNITY
End: 2022-04-08 | Stop reason: CLARIF

## 2022-02-03 RX ORDER — TOPIRAMATE 25 MG/1
TABLET ORAL
Qty: 90 TABLET | Refills: 1 | Status: SHIPPED | OUTPATIENT
Start: 2022-02-03 | End: 2022-02-18 | Stop reason: SDUPTHER

## 2022-02-03 RX ORDER — FREMANEZUMAB-VFRM 225 MG/1.5ML
675 INJECTION SUBCUTANEOUS
Qty: 4.5 ML | Refills: 3 | Status: SHIPPED | OUTPATIENT
Start: 2022-02-03 | End: 2022-06-01 | Stop reason: SDUPTHER

## 2022-02-03 RX ORDER — METOPROLOL SUCCINATE 100 MG/1
100 TABLET, EXTENDED RELEASE ORAL EVERY EVENING
COMMUNITY
Start: 2021-11-11

## 2022-02-03 NOTE — PROGRESS NOTES
Ignacio Prescott presents today for   Chief Complaint   Patient presents with    Migraine    New Patient       Is someone accompanying this pt? Yes,     Is the patient using any DME equipment during [address]? no    Depression Screening:  3 most recent PHQ Screens 8/4/2021   Little interest or pleasure in doing things Not at all   Feeling down, depressed, irritable, or hopeless Not at all   Total Score PHQ 2 0       Learning Assessment:  Learning Assessment 9/9/2019   PRIMARY LEARNER Patient   PRIMARY LANGUAGE ENGLISH   LEARNER PREFERENCE PRIMARY LISTENING   ANSWERED BY patient   RELATIONSHIP SELF       Abuse Screening:  No flowsheet data found. Fall Risk  No flowsheet data found. Coordination of Care:  1. Have you been to the ER, urgent care clinic since your last visit? Hospitalized since your last visit? no    2. Have you seen or consulted any other health care providers outside of the [address] [place] [address] since your last visit? Include any pap smears or colon screening.  no

## 2022-02-03 NOTE — PROGRESS NOTES
[facility]  [address], [address], [place], [address]  [address] Ezequiel Stokes, [address]  Office:  [phone]  Fax: [phone]    Referring: Jon Varela MD      Chief Complaint   Patient presents with    Migraine    New Patient       HPI:  This is a 61year old female who presents for new patient evaluation with chief complaint of headaches. Endorses long history of headaches. She said onset of headaches began around 79. History of migraine headaches. Currently having daily headache. Head pain located frontal and can be located posterior. More so affected on the left side. Can encompass the left orbit intermittently. Head pain described as throbbing, pressure, and sharp pain at variable times. Headaches lasting all day. Headaches associated with light sensitivity, blurred vision that is transient, and lightheadedness. She said the lightheadedness started about a week ago. She is now seeing the room spinning. She denies cardiac associated symptoms or loss of consciousness. Symptoms do not persist for an extended period of time. Denies focal weakness, numbness, or tingling associated with headaches. She tells me of history of stroke back in 1988 that is when her headache started. She is currently taking propranolol 80 mg daily for migraine prevention. In the past she has been on Depakote and nortriptyline and endorses caused mood side effects. Endorses significant mood swings with Depakote. Denies routine use of over-the-counter analgesics. Endorses inconsistent sleep. Denies history of anxiety or depression. Positive family history of headaches. Endorses smoking half pack of cigarettes a day. Tells me of a previous back surgery in September 2021 to her lumbar spine done by Dr. Davin Jaramillo. Has had a dilated eye exam in the last year. He had a head CT complete in December 2021.       Social History     Socioeconomic History    Marital status:      Spouse name: Not on file    Number of children: Not on file    Years of education: Not on file    Highest education level: Not on file   Occupational History    Not on file   Tobacco Use    Smoking status: Current Some Day Smoker     Packs/day: 0.50     Years: 42.00     Pack years: 21.00     Last attempt to quit: 2013     Years since quittin.1    Smokeless tobacco: Never Used    Tobacco comment: no smoking within 24 hours of [employer] Vaping Use: Never used   Substance and Sexual Activity    Alcohol use: No     Alcohol/week: 0.0 standard drinks    Drug use: No    Sexual activity: Yes     Partners: Male     Birth control/protection: Surgical   Other Topics Concern    Not on file   Social History Narrative    Not on file     Social Determinants of Health     Financial Resource Strain:     Difficulty of Paying Living Expenses: Not on file   Food Insecurity:     Worried About Running Out of Food in the Last Year: Not on file    Maciel of Food in the Last Year: Not on file   Transportation Needs:     Lack of Transportation (Medical): Not on file    Lack of Transportation (Non-Medical):  Not on file   Physical Activity:     Days of Exercise per Week: Not on file    Minutes of Exercise per Session: Not on file   Stress:     Feeling of Stress : Not on file   Social Connections:     Frequency of Communication with Friends and Family: Not on file    Frequency of Social Gatherings with Friends and Family: Not on file    Attends Mandaeism Services: Not on file    Active Member of Clubs or Organizations: Not on file    Attends Club or Organization Meetings: Not on file    Marital Status: Not on file   Intimate Partner Violence:     Fear of Current or Ex-Partner: Not on file    Emotionally Abused: Not on file    Physically Abused: Not on file    Sexually Abused: Not on file   Housing Stability:     Unable to Pay for Housing in the Last Year: Not on file    Number of Places Lived in the Last Year: Not on file    Unstable Housing in the Last Year: Not on file       Family History   Problem Relation Age of Onset    Heart Disease Mother     Hypertension Mother     Stroke Mother     Hypertension Father     Hypertension Sister     Diabetes Sister        Current Outpatient Medications   Medication Sig Dispense Refill    metoprolol succinate (TOPROL-XL) 100 mg tablet 1 tablet      hydroCHLOROthiazide (HYDRODIURIL) 25 mg tablet Take 25 mg by mouth daily.  topiramate (TOPAMAX) 25 mg tablet Take 25 mg at bedtime for a week, then take 50 mg at bedtime for a week, then take 75 mg at bedtime thereafter  Indications: migraine prevention 90 Tablet 1    fremanezumab-vfrm (Ajovy Autoinjector) 225 mg/1.5 mL auto-injector 4.5 mL by SubCUTAneous route every three (3) months. 4.5 mL 3    acetaminophen (TYLENOL) 325 mg tablet Take 2 Tablets by mouth every four (4) hours as needed for Pain. 20 Tablet 0    gabapentin (NEURONTIN) 600 mg tablet Take 1 Tablet by mouth three (3) times daily. Max Daily Amount: 1,800 mg. 270 Tablet 2    Advair Diskus 250-50 mcg/dose diskus inhaler INHALE 1 PUFF BY MOUTH TWICE DAILY      omeprazole (PRILOSEC) 20 mg capsule TAKE 1 CAPSULE BY MOUTH ONCE DAILY 30 MINUTES BEFORE MORNING MEAL      propranolol LA (INDERAL LA) 80 mg SR capsule nightly.  medroxyPROGESTERone (DEPO-PROVERA) 150 mg/mL syrg INJECT 1ML INTRAMUSCULARLY EVERY 90 DAYS      albuterol (PROVENTIL HFA, VENTOLIN HFA) 90 mcg/actuation inhaler Take 2 Puffs by inhalation every six (6) hours as needed.  fluticasone (FLOVENT HFA) 44 mcg/actuation inhaler Take 1 Puff by inhalation two (2) times a day.  oxyCODONE IR (ROXICODONE) 5 mg immediate release tablet Take 5 mg by mouth every six (6) hours as needed for Pain.  (Patient not taking: Reported on 2/3/2022)         Past Medical History:   Diagnosis Date    Appetite loss 2013    Arthritis     in back    Asthma 2006    Back pain 8/31/2012    Chest pain     Degenerative disc disease, lumbar     DUB (dysfunctional uterine bleeding)     Foot pain, left     GERD (gastroesophageal reflux disease)     Headache(784.0)     migraine variant    Irregular heartbeat     Kidney calculus     Kidney stone     Menorrhagia     Pelvic pain in female     Postlaminectomy syndrome     S/P ankle ligament repair, left 2/7/2014    Stroke ([facility] [address] 1988, 1996, 1998    x3 - no residual    Tobacco abuse     Trichomonas     UTI (lower urinary tract infection)     Wears glasses 2007       Past Surgical History:   Procedure Laterality Date    HX LUMBAR FUSION  2012    L4-L5    HX LUMBAR FUSION  09/15/2021    HX ORTHOPAEDIC Left 02/07/2014    ligament reconstruction    HX TONSILLECTOMY      HX TUBAL LIGATION  1996    x2       Allergies   Allergen Reactions    Meloxicam Itching    Nortriptyline Other (comments)    Penicillins Rash       Patient Active Problem List   Diagnosis Code    Dysmenorrhea N94.6    Chest pain, unspecified R07.9    Back pain M54.9    Leg pain M79.606    Asthma J45.909    GERD (gastroesophageal reflux disease) K21.9    UTI (urinary tract infection) N39.0    S/P ankle ligament repair, left Z98.890    Ankle pain, right M25.571    Tobacco use Z72.0    Knee pain, left M25.562    Ankle pain, left     OA (osteoarthritis) of knee M17.10    Contusion of left knee S80. 02XA    Left flank pain R10.9    Spondylolisthesis at L4-L5 level M43.16         Review of Systems:   Constitutional: no fever or chills  Skin denies rash or itching  HEENT: Positive visual disturbance.   Denies tinnitus or hearing loss  Respiratory: denies shortness of breath  Cardiovascular: denies chest pain, dyspnea on exertion  Gastrointestinal: does not report nausea or vomiting  Genitourinary: does not report dysuria or incontinence  Musculoskeletal: does not report joint pain or swelling  Endocrine: denies weight change  Hematology: denies easy bruising or bleeding   Neurological: as above in HPI      PHYSICAL EXAMINATION:      VITAL SIGNS:    Visit Vitals  /80   Pulse (!) 108   Resp 18   Ht 5' 5.5\" (1.664 m)   Wt 70.3 kg (155 lb)   LMP 04/26/2015   SpO2 97%   BMI 25.40 kg/m²       GENERAL: Well developed, well nourished, in no apparent distress. HEART: RR, no murmurs heard, no carotid bruits  LUNGS:                      CTAB  EXTREMITIES: No clubbing, cyanosis, or edema is identified. Pulses 2+ and symmetrical.  HEAD:   Normocephalic, atraumatic. NEUROLOGIC EXAMINATION    MENTAL STATUS: Awake, alert, and oriented x 4. Attention and STM are grossly normal. There is no aphasia. Fund of knowledge is adequate. Mood and affect are appropriate  CRANIAL NERVES: Visual fields are full to confrontation. No fundus anomalies observed although contacts in place making exam difficult. Pupils are reactive to light and accommodation. Extraocular movements are intact and there is no nystagmus. Facial sensation is normal  Face is symmetrical.   Hearing is grossly intact. SCM/TPZ 5/5  Palate rises symmetrically. Tongue is in the midline. MOTOR:   Normal tone, bulk, and strength, 5/5 muscle strength throughout. No cogwheel rigidity or clonus present. CEREBELLAR: Finger to nose was normal.   No tremors or dysmetria    SENSORY:  Normal PP, vibration, propioception. Romberg negative    DTR's:   +2 throughout, toes downgoing     GAIT:   Normal gait, able to tandem walk        I have personally reviewed imaging studies.           Results  CT HEAD W WO CONT (Accession [id number]) (Order [id number])    Allergies       Not Specified: Meloxicam;  Nortriptyline;  Penicillins     Exam Information    Status Exam Begun  Exam Ended    Final [99] 12/09/2021 10:44 12/09/2021 11:22 AM [id number] 11:22 AM     Result Information    Status: Final result (Exam End: 12/9/2021 11:22) Provider Status: Open     CT HEAD W WO CONT: Patient Communication    Add Comments  Not seen       Study Result    Narrative & Impression   CT of the head with and without contrast     HISTORY: Chronic left sided migraine      COMPARISON: None.     TECHNIQUE: Helical axial scan to the head was performed from the skull base to  the vertex before and after uneventful nonionic IV contrast administration.     All CT scans at this facility performed using dose optimization techniques as  appreciated to a performed exam, to include automated exposure control,  adjustment of the mA and or KU according to patient size (including appropriate  matching for site specific examination), or use of iterative reconstruction  technique.     FINDINGS: Brain volume is unremarkable for the age. No ventricular dilatation. There is dural-based mass with moderate enhancement identified along the right  superior falx in right frontal lobe which measures 1.6 x 2.8 x 1.7 cm. No  additional enhancing mass identified. Old lacunar infarcts in the medial  bilateral thalami. The gray-white matter differentiation is preservedThere is no  acute intracranial hemorrhage or mass effect present. No skull fracture or extra  axial fluid collections seen. Visualized sinuses and mastoid air cells appear  unremarkable.     IMPRESSION  1. Dural based enhancing mass along the right superior falx, favorable for  meningioma. Recommend contrast brain MR for better evaluation. 2. Old lacunar infarcts in bilateral thalami.        Thank you for your referral.         Impression/Plan  Bayamon Bladimir is a 61 y.o. female whose history and physical are consistent with history of strokes, history of migraines, meningioma, and worsening headaches. Patient presents for evaluation of headaches. Risk factors including history of stroke in history of migraines. Endorses daily headache with transient blurred vision and lightheadedness.   Reviewed recent diagnostic test results from December 2021 consistent with likely meningioma along the right superior falx. Evidence of old lacunar infarcts in bilateral thalami. Discussed adding aspirin 81 mg. No recent lipid panel. Defer initiation of statin to her PCP. Typical goal LCL < 70. Encourage smoking cessation. We will order MRI of the brain with and without gadolinium for further evaluation of underlying mass. We will place referral to neurosurgery for further evaluation of such. Regarding headaches, patient has previously been on Depakote, amitriptyline, and is currently on propranolol 80 mg daily all clinically ineffective for migraine prevention. We discussed CGRP receptor blockers and the initiation of Ajovy. Provided patient with co-pay card and prescription placed. Discussed medication, indication, side effects, and dosing. Patient verbalized understanding. In the meantime can certainly try Topamax in customary fashion. Discussed medication, indication, side effects, and dosing. Patient verbalized understanding. We will follow up closely after testing is complete. All questions agreeable with plan. Diagnoses and all orders for this visit:    1. Worsening headaches  -     MRI BRAIN W WO CONT; Future    2. Dizziness  -     MRI BRAIN W WO CONT; Future    3. History of migraine  -     MRI BRAIN W WO CONT; Future    4. Intractable migraine without aura and with status migrainosus  -     MRI BRAIN W WO CONT; Future    5. History of stroke  -     MRI BRAIN W WO CONT; Future    6. Meningioma ([address]  -     MRI BRAIN W WO CONT; Future  -     REFERRAL TO NEUROSURGERY    Other orders  -     topiramate (TOPAMAX) 25 mg tablet; Take 25 mg at bedtime for a week, then take 50 mg at bedtime for a week, then take 75 mg at bedtime thereafter  Indications: migraine prevention  -     fremanezumab-vfrm (Ajovy Autoinjector) 225 mg/1.5 mL auto-injector; 4.5 mL by SubCUTAneous route every three (3) months.         I spent 45 minutes with the patient in face-to-face consultation, with 37 minutes spent in counseling and coordination of care as described above. Signed By: Gerhardt Finders, NP              PLEASE NOTE:   Portions of this document may have been produced using voice recognition software. Unrecognized errors in transcription may be present.

## 2022-02-09 ENCOUNTER — TELEPHONE (OUTPATIENT)
Dept: NEUROLOGY | Age: 57
End: 2022-02-09

## 2022-02-09 NOTE — TELEPHONE ENCOUNTER
Prior auth received and denied due to certain details about use and treatment was not received.  PA scanned in pt chart

## 2022-02-11 ENCOUNTER — HOSPITAL ENCOUNTER (OUTPATIENT)
Age: 57
Discharge: HOME | End: 2022-02-11
Attending: NURSE PRACTITIONER
Payer: MEDICAID

## 2022-02-11 VITALS — BODY MASS INDEX: 24.58 KG/M2 | WEIGHT: 150 LBS

## 2022-02-11 LAB — CREAT UR-MCNC: 0.8 MG/DL (ref 0.6–1.3)

## 2022-02-11 PROCEDURE — A9575 INJ GADOTERATE MEGLUMI 0.1ML: HCPCS | Performed by: NURSE PRACTITIONER

## 2022-02-11 PROCEDURE — 82565 ASSAY OF CREATININE: CPT

## 2022-02-11 PROCEDURE — 70553 MRI BRAIN STEM W/O & W/DYE: CPT

## 2022-02-11 PROCEDURE — 74011250636 HC RX REV CODE- 250/636: Performed by: NURSE PRACTITIONER

## 2022-02-11 RX ADMIN — GADOTERATE MEGLUMINE 15 ML: 376.9 INJECTION INTRAVENOUS at 19:35

## 2022-02-15 ENCOUNTER — TELEPHONE (OUTPATIENT)
Dept: NEUROLOGY | Age: 57
End: 2022-02-15

## 2022-02-18 ENCOUNTER — OFFICE VISIT (OUTPATIENT)
Dept: NEUROLOGY | Age: 57
End: 2022-02-18
Payer: MEDICAID

## 2022-02-18 VITALS
RESPIRATION RATE: 18 BRPM | SYSTOLIC BLOOD PRESSURE: 118 MMHG | BODY MASS INDEX: 24.59 KG/M2 | HEART RATE: 89 BPM | DIASTOLIC BLOOD PRESSURE: 76 MMHG | WEIGHT: 153 LBS | OXYGEN SATURATION: 96 % | HEIGHT: 66 IN

## 2022-02-18 DIAGNOSIS — D32.9 MENINGIOMA (HCC): ICD-10-CM

## 2022-02-18 DIAGNOSIS — G43.011 INTRACTABLE MIGRAINE WITHOUT AURA AND WITH STATUS MIGRAINOSUS: Primary | ICD-10-CM

## 2022-02-18 PROCEDURE — 99214 OFFICE O/P EST MOD 30 MIN: CPT | Performed by: NURSE PRACTITIONER

## 2022-02-18 RX ORDER — TOPIRAMATE 100 MG/1
TABLET, FILM COATED ORAL
Qty: 90 TABLET | Refills: 1 | Status: SHIPPED | OUTPATIENT
Start: 2022-02-18 | End: 2022-04-08 | Stop reason: CLARIF

## 2022-02-18 NOTE — PROGRESS NOTES
[facility]  [address], [address], [place], [address]  [address] [facility], [address]  Office:  [phone]  Fax: [phone]  Chief Complaint   Patient presents with    Headache     follow up MRI     This is a 59-year-old female who presents for follow-up of headache and meningioma. In the interim endorses headaches continue with some improvement. On Topamax 75 mg nightly and tolerating this well. Picking up Ajovy this weekend. Here for results of MRI. No other concerns at this time. Past Medical History:   Diagnosis Date    Appetite loss 2013    Arthritis     in back    Asthma 2006    Back pain 8/31/2012    Chest pain     Degenerative disc disease, lumbar     DUB (dysfunctional uterine bleeding)     Foot pain, left     GERD (gastroesophageal reflux disease)     Headache(784.0)     migraine variant    Irregular heartbeat     Kidney calculus     Kidney stone     Menorrhagia     Pelvic pain in female     Postlaminectomy syndrome     S/P ankle ligament repair, left 2/7/2014    Stroke ([facility] [address] 1988, 1996, 1998    x3 - no residual    Tobacco abuse     Trichomonas     UTI (lower urinary tract infection)     Wears glasses 2007       Past Surgical History:   Procedure Laterality Date    HX LUMBAR FUSION  2012    L4-L5    HX LUMBAR FUSION  09/15/2021    HX ORTHOPAEDIC Left 02/07/2014    ligament reconstruction    HX TONSILLECTOMY      HX TUBAL LIGATION  1996    x2       Current Outpatient Medications   Medication Sig Dispense Refill    topiramate (TOPAMAX) 100 mg tablet Take 100 mg at bedtime  Indications: migraine prevention 90 Tablet 1    metoprolol succinate (TOPROL-XL) 100 mg tablet 1 tablet      hydroCHLOROthiazide (HYDRODIURIL) 25 mg tablet Take 25 mg by mouth daily.  acetaminophen (TYLENOL) 325 mg tablet Take 2 Tablets by mouth every four (4) hours as needed for Pain.  20 Tablet 0    gabapentin (NEURONTIN) 600 mg tablet Take 1 Tablet by mouth three (3) times daily. Max Daily Amount: 1,800 mg. 270 Tablet 2    Advair Diskus 250-50 mcg/dose diskus inhaler INHALE 1 PUFF BY MOUTH TWICE DAILY      omeprazole (PRILOSEC) 20 mg capsule TAKE 1 CAPSULE BY MOUTH ONCE DAILY 30 MINUTES BEFORE MORNING MEAL      propranolol LA (INDERAL LA) 80 mg SR capsule nightly.  medroxyPROGESTERone (DEPO-PROVERA) 150 mg/mL syrg INJECT 1ML INTRAMUSCULARLY EVERY 90 DAYS      albuterol (PROVENTIL HFA, VENTOLIN HFA) 90 mcg/actuation inhaler Take 2 Puffs by inhalation every six (6) hours as needed.  fluticasone (FLOVENT HFA) 44 mcg/actuation inhaler Take 1 Puff by inhalation two (2) times a day.  fremanezumab-vfrm (Ajovy Autoinjector) 225 mg/1.5 mL auto-injector 4.5 mL by SubCUTAneous route every three (3) months. (Patient not taking: Reported on 2022) 4.5 mL 3    oxyCODONE IR (ROXICODONE) 5 mg immediate release tablet Take 5 mg by mouth every six (6) hours as needed for Pain.  (Patient not taking: Reported on 2/3/2022)          Allergies   Allergen Reactions    Meloxicam Itching    Nortriptyline Other (comments)    Penicillins Rash       Social History     Tobacco Use    Smoking status: Current Some Day Smoker     Packs/day: 0.50     Years: 42.00     Pack years: 21.00     Last attempt to quit: 2013     Years since quittin.2    Smokeless tobacco: Never Used    Tobacco comment: no smoking within 24 hours of [employer] Vaping Use: Never used   Substance Use Topics    Alcohol use: No     Alcohol/week: 0.0 standard drinks    Drug use: No       Family History   Problem Relation Age of Onset    Heart Disease Mother     Hypertension Mother     Stroke Mother     Hypertension Father     Hypertension Sister     Diabetes Sister        Review of Systems  GENERAL: Denies fever or fatigue  CARDIAC: No CP or SOB  PULMONARY: No cough of SOB  MUSCULOSKELETAL: No new joint pain  NEURO: SEE HPI    Examination  Visit Vitals  BP 118/76   Pulse 89   Resp 18   Ht 5' 5.5\" (1.664 m)   Wt 69.4 kg (153 lb)   LMP 04/26/2015   SpO2 96%   BMI 25.07 kg/m²       This is a very pleasant 62-year-old female. She is alert and in no apparent distress. Speech is clear. No facial asymmetry. Freely moves the upper and lower extremities. No signs of ataxia or incoordination. Steady gait. No results found for this or any previous visit (from the past 24 hour(s)). Results  MRI BRAIN W WO CONT (Accession [id number]) (Order [id number])    Allergies       Not Specified: Meloxicam;  Nortriptyline;  Penicillins     Exam Information    Status Exam Begun  Exam Ended    Final [99] 2/11/2022 18:37 2/11/2022  7:46 PM [id number]  7:46 PM     Result Information    Status: Final result (Exam End: 2/11/2022 19:46) Provider Status: Reviewed     MRI BRAIN W WO CONT: Patient Communication    Add Comments  Not seen       Study Result    Narrative & Impression   EXAM: MRI BRAIN W WO CONT     CLINICAL INDICATION/HISTORY: worsening headaches, dizziness, meningioma     TECHNIQUE: Multisequence multiplanar MR imaging acquired through the brain with  and without IV contrast.      COMPARISON: Head CT December 2021, brain MRI November 2007     FINDINGS:     Parenchyma: Again demonstrated is an avidly enhancing dural based extra-axial  mass straddling the high posterior falx predominating right of midline. The bulk  of the mass is to the right of midline. Overall measures 3.2 x 1.8 x 1.7 cm  (AP/CC/TV); unchanged from CT December 2021, but has developed since MRI of  November 2007. -Mild amount of subjacent hyperostosis. No diploic space involvement evident.  -Mild compressive effect on adjacent right more than left parietal lobe.  No  overt reactive parenchymal signal change or pathologic enhancement within the  parenchyma.     It is possible there is a similarly featured plaque shaped avidly enhancing  focus at the lower left lateral cerebrum along juncture of tentorium and dura,  just posterior of the petrous ridge. Difficult to separate from sigmoid sinus. Reference postcontrast coronal image 10, axial T1 image 9, T2 and FLAIR image  10, sagittal T1 images 4-5. Also shows enhancement on the recent CT. Not  certainly seen on remote MRI.     There is a minor amount of scattered chronic white matter disease with T2 and  FLAIR hyperintensity. No acute infarction. No acute hemorrhage.     CSF spaces: Ventricles and cisterns remain midline in position     IAC regions: Unremarkable     Parasellar region: Unremarkable     Vasculature: Appropriate flow voids within the major skull base vasculature.     Cervicomedullary junction: Patent     Orbits: Unremarkable     Paranasal sinuses: Clear            IMPRESSION     1.  Moderately sized avidly enhancing dural based mass lesion at the left  parietal area, straddling the posterior falx  -Absent history of any other neoplasm elsewhere, is most typical of a meningioma  -Minor mass effect on subjacent brain parenchyma     2. It is possible there is a secondary plaque shaped focus along the lateral  margin of left side of tentorium as outlined above.     3. Minor amount of chronic white matter disease, typically small vessel ischemic  sequelae.              Impression/Plan  Eric Crump is a 61 y.o. female whose history and physical are consistent with meningioma, cerebral and migraines. Discussed findings on MRI of the brain. Previous referral placed to neurosurgery for evaluation of meningioma. Per radiology report has grown since MRI in 2007 comparison. I am not able to locate the mentioned imaging for comparison. Gave patient and spouse information regarding referral.   Increase Topamax to 100 mg. New rx provided. Start Ajovy once she picks this up. Follow up in 3 months. All questions addressed and patient/family member are agreeable with plan of care. Diagnoses and all orders for this visit:    1.  Intractable migraine without aura and with status migrainosus    2. Meningioma ([address]    Other orders  -     topiramate (TOPAMAX) 100 mg tablet; Take 100 mg at bedtime  Indications: migraine prevention        Signed By: Geo Rudd NP        PLEASE NOTE:   Portions of this document may have been produced using voice recognition software. Unrecognized errors in transcription may be present.

## 2022-02-18 NOTE — PROGRESS NOTES
Lilia Heller presents today for   Chief Complaint   Patient presents with    Headache     follow up MRI       Is someone accompanying this pt? Yes, Friend    Is the patient using any DME equipment during OV? no    Depression Screening:  3 most recent PHQ Screens 8/4/2021   Little interest or pleasure in doing things Not at all   Feeling down, depressed, irritable, or hopeless Not at all   Total Score PHQ 2 0       Learning Assessment:  Learning Assessment 9/9/2019   PRIMARY LEARNER Patient   PRIMARY LANGUAGE ENGLISH   LEARNER PREFERENCE PRIMARY LISTENING   ANSWERED BY patient   RELATIONSHIP SELF       Abuse Screening:  No flowsheet data found. Fall Risk  No flowsheet data found. Coordination of Care:  1. Have you been to the ER, urgent care clinic since your last visit? Hospitalized since your last visit? no    2. Have you seen or consulted any other health care providers outside of the [address] [place] [address] since your last visit? Include any pap smears or colon screening.  no

## 2022-02-27 ENCOUNTER — APPOINTMENT (OUTPATIENT)
Dept: GENERAL RADIOLOGY | Age: 57
End: 2022-02-27
Attending: EMERGENCY MEDICINE
Payer: MEDICAID

## 2022-02-27 ENCOUNTER — HOSPITAL ENCOUNTER (EMERGENCY)
Age: 57
Discharge: HOME | End: 2022-02-27
Attending: EMERGENCY MEDICINE
Payer: MEDICAID

## 2022-02-27 VITALS
DIASTOLIC BLOOD PRESSURE: 83 MMHG | HEART RATE: 99 BPM | WEIGHT: 155 LBS | RESPIRATION RATE: 20 BRPM | BODY MASS INDEX: 25.83 KG/M2 | OXYGEN SATURATION: 100 % | SYSTOLIC BLOOD PRESSURE: 123 MMHG | HEIGHT: 65 IN | TEMPERATURE: 97.7 F

## 2022-02-27 DIAGNOSIS — J45.31 ACUTE EXACERBATION OF MILD PERSISTENT EXTRINSIC ASTHMA: Primary | ICD-10-CM

## 2022-02-27 DIAGNOSIS — E87.6 HYPOKALEMIA: ICD-10-CM

## 2022-02-27 LAB
ALBUMIN SERPL-MCNC: 4.1 G/DL (ref 3.4–5)
ALBUMIN/GLOB SERPL: 1.2 {RATIO} (ref 0.8–1.7)
ALP SERPL-CCNC: 133 U/L (ref 45–117)
ALT SERPL-CCNC: 41 U/L (ref 13–56)
ANION GAP SERPL CALC-SCNC: 7 MMOL/L (ref 3–18)
AST SERPL-CCNC: 19 U/L (ref 10–38)
BASOPHILS # BLD: 0 K/UL (ref 0–0.1)
BASOPHILS NFR BLD: 0 % (ref 0–2)
BILIRUB SERPL-MCNC: 0.9 MG/DL (ref 0.2–1)
BNP SERPL-MCNC: 35 PG/ML (ref 0–900)
BUN SERPL-MCNC: 19 MG/DL (ref 7–18)
BUN/CREAT SERPL: 24 (ref 12–20)
CALCIUM SERPL-MCNC: 9.3 MG/DL (ref 8.5–10.1)
CHLORIDE SERPL-SCNC: 109 MMOL/L (ref 100–111)
CO2 SERPL-SCNC: 25 MMOL/L (ref 21–32)
CREAT SERPL-MCNC: 0.79 MG/DL (ref 0.6–1.3)
DIFFERENTIAL METHOD BLD: ABNORMAL
EOSINOPHIL # BLD: 0.2 K/UL (ref 0–0.4)
EOSINOPHIL NFR BLD: 2 % (ref 0–5)
ERYTHROCYTE [DISTWIDTH] IN BLOOD BY AUTOMATED COUNT: 14.2 % (ref 11.6–14.5)
GLOBULIN SER CALC-MCNC: 3.5 G/DL (ref 2–4)
GLUCOSE SERPL-MCNC: 83 MG/DL (ref 74–99)
HCT VFR BLD AUTO: 41.9 % (ref 35–45)
HGB BLD-MCNC: 14.6 G/DL (ref 12–16)
IMM GRANULOCYTES # BLD AUTO: 0.1 K/UL (ref 0–0.04)
IMM GRANULOCYTES NFR BLD AUTO: 1 % (ref 0–0.5)
LYMPHOCYTES # BLD: 3.3 K/UL (ref 0.9–3.6)
LYMPHOCYTES NFR BLD: 30 % (ref 21–52)
MCH RBC QN AUTO: 31.8 PG (ref 24–34)
MCHC RBC AUTO-ENTMCNC: 34.8 G/DL (ref 31–37)
MCV RBC AUTO: 91.3 FL (ref 78–100)
MONOCYTES # BLD: 0.7 K/UL (ref 0.05–1.2)
MONOCYTES NFR BLD: 6 % (ref 3–10)
NEUTS SEG # BLD: 6.7 K/UL (ref 1.8–8)
NEUTS SEG NFR BLD: 61 % (ref 40–73)
NRBC # BLD: 0 K/UL (ref 0–0.01)
NRBC BLD-RTO: 0 PER 100 WBC
PLATELET # BLD AUTO: 277 K/UL (ref 135–420)
PMV BLD AUTO: 10.3 FL (ref 9.2–11.8)
POTASSIUM SERPL-SCNC: 2.8 MMOL/L (ref 3.5–5.5)
PROT SERPL-MCNC: 7.6 G/DL (ref 6.4–8.2)
RBC # BLD AUTO: 4.59 M/UL (ref 4.2–5.3)
SODIUM SERPL-SCNC: 141 MMOL/L (ref 136–145)
TROPONIN-HIGH SENSITIVITY: 4 NG/L (ref 0–54)
WBC # BLD AUTO: 11.1 K/UL (ref 4.6–13.2)

## 2022-02-27 PROCEDURE — 74011250636 HC RX REV CODE- 250/636: Performed by: EMERGENCY MEDICINE

## 2022-02-27 PROCEDURE — 96374 THER/PROPH/DIAG INJ IV PUSH: CPT

## 2022-02-27 PROCEDURE — 71046 X-RAY EXAM CHEST 2 VIEWS: CPT

## 2022-02-27 PROCEDURE — 80053 COMPREHEN METABOLIC PANEL: CPT

## 2022-02-27 PROCEDURE — 83880 ASSAY OF NATRIURETIC PEPTIDE: CPT

## 2022-02-27 PROCEDURE — 74011000250 HC RX REV CODE- 250: Performed by: EMERGENCY MEDICINE

## 2022-02-27 PROCEDURE — 93005 ELECTROCARDIOGRAM TRACING: CPT

## 2022-02-27 PROCEDURE — 96361 HYDRATE IV INFUSION ADD-ON: CPT

## 2022-02-27 PROCEDURE — 74011250637 HC RX REV CODE- 250/637: Performed by: EMERGENCY MEDICINE

## 2022-02-27 PROCEDURE — 99285 EMERGENCY DEPT VISIT HI MDM: CPT

## 2022-02-27 PROCEDURE — 85025 COMPLETE CBC W/AUTO DIFF WBC: CPT

## 2022-02-27 PROCEDURE — 84484 ASSAY OF TROPONIN QUANT: CPT

## 2022-02-27 RX ORDER — POTASSIUM CHLORIDE 7.45 MG/ML
10 INJECTION INTRAVENOUS
Status: COMPLETED | OUTPATIENT
Start: 2022-02-27 | End: 2022-02-27

## 2022-02-27 RX ORDER — IPRATROPIUM BROMIDE AND ALBUTEROL SULFATE 2.5; .5 MG/3ML; MG/3ML
3 SOLUTION RESPIRATORY (INHALATION)
Status: COMPLETED | OUTPATIENT
Start: 2022-02-27 | End: 2022-02-27

## 2022-02-27 RX ORDER — PREDNISONE 50 MG/1
50 TABLET ORAL DAILY
Qty: 4 TABLET | Refills: 0 | Status: SHIPPED | OUTPATIENT
Start: 2022-02-27 | End: 2022-03-03

## 2022-02-27 RX ORDER — IPRATROPIUM BROMIDE AND ALBUTEROL SULFATE 2.5; .5 MG/3ML; MG/3ML
6 SOLUTION RESPIRATORY (INHALATION)
Status: COMPLETED | OUTPATIENT
Start: 2022-02-27 | End: 2022-02-27

## 2022-02-27 RX ADMIN — IPRATROPIUM BROMIDE AND ALBUTEROL SULFATE 6 ML: .5; 2.5 SOLUTION RESPIRATORY (INHALATION) at 14:00

## 2022-02-27 RX ADMIN — METHYLPREDNISOLONE SODIUM SUCCINATE 125 MG: 125 INJECTION, POWDER, FOR SOLUTION INTRAMUSCULAR; INTRAVENOUS at 14:00

## 2022-02-27 RX ADMIN — POTASSIUM CHLORIDE 10 MEQ: 7.45 INJECTION INTRAVENOUS at 15:07

## 2022-02-27 RX ADMIN — POTASSIUM BICARBONATE 50 MEQ: 978 TABLET, EFFERVESCENT ORAL at 15:07

## 2022-02-27 RX ADMIN — IPRATROPIUM BROMIDE AND ALBUTEROL SULFATE 3 ML: .5; 2.5 SOLUTION RESPIRATORY (INHALATION) at 15:07

## 2022-02-27 NOTE — DISCHARGE INSTRUCTIONS
1.  Return if you are worsening in the meantime. 2.  Follow-up with your primary care doctor in 2 to 3 days. 3.  Take the prednisone as prescribed continue to use your albuterol inhaler at home.

## 2022-02-27 NOTE — ED PROVIDER NOTES
EMERGENCY DEPARTMENT HISTORY AND PHYSICAL EXAM    1:33 PM    Date: 2/27/2022  Patient Name: Dayday Lenz    History of Presenting Illness     Chief Complaint   Patient presents with    Wheezing       History Provided By: Patient  Location/Duration/Severity/Modifying factors   Female who presents to the emergency department with a chief complaint of asthma exacerbation. Patient complains primarily of wheezing chest tightness and congestion. She denies any chest pain but does describe chest tightness which is not usually part of her asthma flares. She reports that the breathing feels identical to previous asthma flares. Reports a started 2 days ago after she visited family member who has a cat and cat is a known allergy for her. She used her albuterol treatment at home but did not did not seem to help. She denies any dizziness nausea, vomiting. She also notes that she has had trouble swallowing since she had her last stroke and that seems to be slightly worse today and she feels like there is something stuck there. She is able to drink past it and not having any issues with regurgitating or vomiting. Rates her symptoms as moderate. PCP: Rubens Crandall MD    Current Outpatient Medications   Medication Sig Dispense Refill    predniSONE (DELTASONE) 50 mg tablet Take 1 Tablet by mouth daily for 4 days. 4 Tablet 0    topiramate (TOPAMAX) 100 mg tablet Take 100 mg at bedtime  Indications: migraine prevention 90 Tablet 1    metoprolol succinate (TOPROL-XL) 100 mg tablet 1 tablet      hydroCHLOROthiazide (HYDRODIURIL) 25 mg tablet Take 25 mg by mouth daily.  fremanezumab-vfrm (Ajovy Autoinjector) 225 mg/1.5 mL auto-injector 4.5 mL by SubCUTAneous route every three (3) months. (Patient not taking: Reported on 2/18/2022) 4.5 mL 3    oxyCODONE IR (ROXICODONE) 5 mg immediate release tablet Take 5 mg by mouth every six (6) hours as needed for Pain.  (Patient not taking: Reported on 2/3/2022)  acetaminophen (TYLENOL) 325 mg tablet Take 2 Tablets by mouth every four (4) hours as needed for Pain. 20 Tablet 0    gabapentin (NEURONTIN) 600 mg tablet Take 1 Tablet by mouth three (3) times daily. Max Daily Amount: 1,800 mg. 270 Tablet 2    Advair Diskus 250-50 mcg/dose diskus inhaler INHALE 1 PUFF BY MOUTH TWICE DAILY      omeprazole (PRILOSEC) 20 mg capsule TAKE 1 CAPSULE BY MOUTH ONCE DAILY 30 MINUTES BEFORE MORNING MEAL      propranolol LA (INDERAL LA) 80 mg SR capsule nightly.  medroxyPROGESTERone (DEPO-PROVERA) 150 mg/mL syrg INJECT 1ML INTRAMUSCULARLY EVERY 90 DAYS      albuterol (PROVENTIL HFA, VENTOLIN HFA) 90 mcg/actuation inhaler Take 2 Puffs by inhalation every six (6) hours as needed.  fluticasone (FLOVENT HFA) 44 mcg/actuation inhaler Take 1 Puff by inhalation two (2) times a day.            Past History     Past Medical History:  Past Medical History:   Diagnosis Date    Appetite loss 2013    Arthritis     in back    Asthma 2006    Back pain 8/31/2012    Chest pain     Degenerative disc disease, lumbar     DUB (dysfunctional uterine bleeding)     Foot pain, left     GERD (gastroesophageal reflux disease)     Headache(784.0)     migraine variant    Irregular heartbeat     Kidney calculus     Kidney stone     Menorrhagia     Pelvic pain in female     Postlaminectomy syndrome     S/P ankle ligament repair, left 2/7/2014    Stroke ([facility] [address] 1988, 1996, 1998    x3 - no residual    Tobacco abuse     Trichomonas     UTI (lower urinary tract infection)     Wears glasses 2007       Past Surgical History:  Past Surgical History:   Procedure Laterality Date    HX LUMBAR FUSION  2012    L4-L5    HX LUMBAR FUSION  09/15/2021    HX ORTHOPAEDIC Left 02/07/2014    ligament reconstruction    HX TONSILLECTOMY      HX TUBAL LIGATION  1996    x2       Family History:  Family History   Problem Relation Age of Onset    Heart Disease Mother     Hypertension Mother     Stroke Mother     Hypertension Father     Hypertension Sister     Diabetes Sister        Social History:  Social History     Tobacco Use    Smoking status: Current Some Day Smoker     Packs/day: 0.50     Years: 42.00     Pack years: 21.00     Last attempt to quit: 2013     Years since quittin.2    Smokeless tobacco: Never Used    Tobacco comment: no smoking within 24 hours of [employer] Vaping Use: Never used   Substance Use Topics    Alcohol use: No     Alcohol/week: 0.0 standard drinks    Drug use: No       Allergies: Allergies   Allergen Reactions    Meloxicam Itching    Nortriptyline Other (comments)    Penicillins Rash       I reviewed and confirmed the above information with patient and updated as necessary. Review of Systems     Review of Systems   Constitutional: Negative for chills and fever. HENT: Negative for congestion, rhinorrhea, sinus pressure and sneezing. Eyes: Negative for visual disturbance. Respiratory: Positive for cough, chest tightness and wheezing. Negative for shortness of breath. Cardiovascular: Negative for chest pain. Gastrointestinal: Negative for abdominal pain, diarrhea, nausea and vomiting. Genitourinary: Negative for dysuria, frequency and urgency. Musculoskeletal: Negative for back pain and neck pain. Skin: Negative for rash. Neurological: Negative for syncope, numbness and headaches. Physical Exam     Visit Vitals  /83 (BP 1 Location: Left upper arm, BP Patient Position: At rest)   Pulse 99   Temp 97.7 °F (36.5 °C)   Resp 20   Ht 5' 5\" (1.651 m)   Wt 70.3 kg (155 lb)   LMP 2015   SpO2 100%   BMI 25.79 kg/m²       Physical Exam  Vitals and nursing note reviewed. Constitutional:       General: She is not in acute distress. Appearance: Normal appearance. She is normal weight. HENT:      Head: Normocephalic and atraumatic.       Right Ear: External ear normal.      Left Ear: External ear normal.      Nose: Congestion present. No rhinorrhea. Mouth/Throat:      Mouth: Mucous membranes are moist.      Pharynx: Oropharynx is clear. No oropharyngeal exudate or posterior oropharyngeal erythema. Eyes:      Conjunctiva/sclera: Conjunctivae normal.      Pupils: Pupils are equal, round, and reactive to light. Cardiovascular:      Rate and Rhythm: Normal rate and regular rhythm. Pulses: Normal pulses. Heart sounds: Normal heart sounds. No murmur heard. Pulmonary:      Effort: Pulmonary effort is normal.      Breath sounds: Wheezing present. No rhonchi or rales. Comments: rare occasional scattered wheezes diminished air entry bilaterally. Abdominal:      General: Abdomen is flat. Tenderness: There is no abdominal tenderness. There is no guarding or rebound. Musculoskeletal:         General: No swelling or tenderness. Normal range of motion. Cervical back: Normal range of motion and neck supple. Right lower leg: No edema. Left lower leg: No edema. Skin:     General: Skin is warm and dry. Capillary Refill: Capillary refill takes less than 2 seconds. Findings: No rash. Neurological:      General: No focal deficit present. Mental Status: She is alert.          Diagnostic Study Results     Labs -  Recent Results (from the past 24 hour(s))   EKG, 12 LEAD, INITIAL    Collection Time: 02/27/22  1:51 PM   Result Value Ref Range    Ventricular Rate 95 BPM    Atrial Rate 95 BPM    P-R Interval 146 ms    QRS Duration 88 ms    Q-T Interval 366 ms    QTC Calculation (Bezet) 459 ms    Calculated P Axis 66 degrees    Calculated R Axis -29 degrees    Calculated T Axis 43 degrees    Diagnosis       Poor data quality, interpretation may be adversely affected  Normal sinus rhythm  Possible Left atrial enlargement  RSR' or QR pattern in V1 suggests right ventricular conduction delay  Nonspecific T wave abnormality  Abnormal ECG  When compared with ECG of 17-AUG-2021 09:52,  RSR' pattern in V1 is now present     CBC WITH AUTOMATED DIFF    Collection Time: 02/27/22  2:00 PM   Result Value Ref Range    WBC 11.1 4.6 - 13.2 K/uL    RBC 4.59 4.20 - 5.30 M/uL    HGB 14.6 12.0 - 16.0 g/dL    HCT 41.9 35.0 - 45.0 %    MCV 91.3 78.0 - 100.0 FL    MCH 31.8 24.0 - 34.0 PG    MCHC 34.8 31.0 - 37.0 g/dL    RDW 14.2 11.6 - 14.5 %    PLATELET 602 828 - 783 K/uL    MPV 10.3 9.2 - 11.8 FL    NRBC 0.0 0  WBC    ABSOLUTE NRBC 0.00 0.00 - 0.01 K/uL    NEUTROPHILS 61 40 - 73 %    LYMPHOCYTES 30 21 - 52 %    MONOCYTES 6 3 - 10 %    EOSINOPHILS 2 0 - 5 %    BASOPHILS 0 0 - 2 %    IMMATURE GRANULOCYTES 1 (H) 0.0 - 0.5 %    ABS. NEUTROPHILS 6.7 1.8 - 8.0 K/UL    ABS. LYMPHOCYTES 3.3 0.9 - 3.6 K/UL    ABS. MONOCYTES 0.7 0.05 - 1.2 K/UL    ABS. EOSINOPHILS 0.2 0.0 - 0.4 K/UL    ABS. BASOPHILS 0.0 0.0 - 0.1 K/UL    ABS. IMM. GRANS. 0.1 (H) 0.00 - 0.04 K/UL    DF AUTOMATED     METABOLIC PANEL, COMPREHENSIVE    Collection Time: 02/27/22  2:00 PM   Result Value Ref Range    Sodium 141 136 - 145 mmol/L    Potassium 2.8 (LL) 3.5 - 5.5 mmol/L    Chloride 109 100 - 111 mmol/L    CO2 25 21 - 32 mmol/L    Anion gap 7 3.0 - 18 mmol/L    Glucose 83 74 - 99 mg/dL    BUN 19 (H) 7.0 - 18 MG/DL    Creatinine 0.79 0.6 - 1.3 MG/DL    BUN/Creatinine ratio 24 (H) 12 - 20      GFR est AA >60 >60 ml/min/1.73m2    GFR est non-AA >60 >60 ml/min/1.73m2    Calcium 9.3 8.5 - 10.1 MG/DL    Bilirubin, total 0.9 0.2 - 1.0 MG/DL    ALT (SGPT) 41 13 - 56 U/L    AST (SGOT) 19 10 - 38 U/L    Alk.  phosphatase 133 (H) 45 - 117 U/L    Protein, total 7.6 6.4 - 8.2 g/dL    Albumin 4.1 3.4 - 5.0 g/dL    Globulin 3.5 2.0 - 4.0 g/dL    A-G Ratio 1.2 0.8 - 1.7     NT-PRO BNP    Collection Time: 02/27/22  2:00 PM   Result Value Ref Range    NT pro-BNP 35 0 - 900 PG/ML   TROPONIN-HIGH SENSITIVITY    Collection Time: 02/27/22  2:00 PM   Result Value Ref Range    Troponin-High Sensitivity 4 0 - 54 ng/L         Radiologic Studies -   XR CHEST PA LAT   Final Result   No acute cardiopulmonary process. Moderate COPD. Thank you for your referral.              Medical Decision Making   I am the first provider for this patient. I reviewed the vital signs, available nursing notes, past medical history, past surgical history, family history and social history. Vital Signs-Reviewed the patient's vital signs. EKG: EKG interpretation: Normal sinus rhythm, rate of 95 bpm, normal axis and intervals. No ST elevation or ST depression. RSR pattern in V1, T wave inversions. Overall normal sinus rhythm without acute ischemic changes. Records Reviewed: Nursing Notes, Old Medical Records, Previous Radiology Studies and Previous Laboratory Studies (Time of Review: 1:33 PM)      Provider Notes (Medical Decision Making):   MDM  Number of Diagnoses or Management Options  Acute exacerbation of mild persistent extrinsic asthma  Hypokalemia  Diagnosis management comments: Patient is a 19-year-old female present with chest tightness and wheezing, reportedly similar to previous asthma exacerbations. Patient denies any chest pain. DDX: Asthma exacerbation, acute coronary syndrome, CHF, pneumonia, pneumothorax, allergies pleural effusion, etc.    2 breathing treatments and expand the work-up to include some blood work and chest x-ray as well as an EKG given her risk factors. Less likely ACS although not excluded. Symptoms have been going on for 2 days. Results reviewed and patient reassessed. Her potassium was quite low at 2.8 so this was replaced with both IV and oral modalities. She felt better after 3 breathing treatments and wanted to go home. Less likely PE given patient's description of the symptoms her exam and her improvement with bronchodilator therapy. Patient declining further treatments and wants to go home immediately.     We will treat her with prednisone for several days recommended close follow-up with her primary care doctor and return if she is experiencing any worsening. At this time, patient is stable and appropriate for discharge home.  Patient demonstrates understanding of current diagnoses and is in agreement with the treatment plan. Orquidea Bradshaw are advised that while the likelihood of serious underlying condition is low at this point given the evaluation performed today, we cannot fully rule it out. Orquidea Bradshaw are advised to immediately return with any new symptoms or worsening of current condition. Any Incidental findings were noted on the patient's discharge paperwork as well as verbally directly to the patient, and the appropriate follow-up was given to the patient as far as instructions on testing needed as well as the timeframe.  All questions have been answered. Sharda Carroll is given educational material regarding their diagnoses, including danger symptoms and when to return to the ED. This note was dictated utilizing Dragon voice recognition software. Unfortunately this leads to occasional typographical errors. I apologize in advance if the situation occurs. If questions occur please do not hesitate to contact me directly. Tang Alan DO            ED Course: Progress Notes, Reevaluation, and Consults:  ED Course as of 02/28/22 0608   Justino San Feb 27, 2022   1459 Troponin negative, potassium 21 order additional treatment per patient's request and replace her potassium. [MICHELLE]      ED Course User Index  [MICHELLE] Giana Masters DO       Procedures    Critical Care Time: CRITICAL CARE NOTE:    I have spent 37 minutes of critical care time involved in lab review, consultations with specialist, family decision-making, and documentation. During this entire length of time I was immediately available to the patient. Critical Care:   The reason for providing this level of medical care for this critically ill patient was due a critical illness that impaired one or more vital organ systems such that there was a high probability of imminent or life threatening deterioration in the patients condition. This care involved high complexity decision making to assess, manipulate, and support vital system functions, to treat this vital organ system failure and to prevent further life threatening deterioration of the patients condition. Time is exclusive of procedural and teaching time. Tang Alan DO      Diagnosis     Clinical Impression:   1. Acute exacerbation of mild persistent extrinsic asthma    2. Hypokalemia        Disposition: Discharge    Follow-up Information     Follow up With Specialties Details Why Contact Info    Layla Hoang MD  Call in 1 day      [facility] EMERGENCY DEPT Emergency Medicine  As needed, If symptoms worsen; or [facility] Emergency Department [address] [facility]  [phone]           Discharge Medication List as of 2/27/2022  4:15 PM      START taking these medications    Details   predniSONE (DELTASONE) 50 mg tablet Take 1 Tablet by mouth daily for 4 days. , Normal, Disp-4 Tablet, R-0         CONTINUE these medications which have NOT CHANGED    Details   topiramate (TOPAMAX) 100 mg tablet Take 100 mg at bedtime  Indications: migraine prevention, Normal, Disp-90 Tablet, R-1      metoprolol succinate (TOPROL-XL) 100 mg tablet 1 tablet, Historical Med      hydroCHLOROthiazide (HYDRODIURIL) 25 mg tablet Take 25 mg by mouth daily. , Historical Med      fremanezumab-vfrm (Ajovy Autoinjector) 225 mg/1.5 mL auto-injector 4.5 mL by SubCUTAneous route every three (3) months., Normal, Disp-4.5 mL, R-3      oxyCODONE IR (ROXICODONE) 5 mg immediate release tablet Take 5 mg by mouth every six (6) hours as needed for Pain., Historical Med      acetaminophen (TYLENOL) 325 mg tablet Take 2 Tablets by mouth every four (4) hours as needed for Pain., Normal, Disp-20 Tablet, R-0      gabapentin (NEURONTIN) 600 mg tablet Take 1 Tablet by mouth three (3) times daily.  Max Daily Amount: 1,800 mg., Normal, Disp-270 Tablet, R-2      Advair Diskus 250-50 mcg/dose diskus inhaler INHALE 1 PUFF BY MOUTH TWICE DAILY, Historical Med, ANNA      omeprazole (PRILOSEC) 20 mg capsule TAKE 1 CAPSULE BY MOUTH ONCE DAILY 30 MINUTES BEFORE MORNING MEAL, Historical Med      propranolol LA (INDERAL LA) 80 mg SR capsule nightly., Historical Med      medroxyPROGESTERone (DEPO-PROVERA) 150 mg/mL syrg INJECT 1ML INTRAMUSCULARLY EVERY 90 DAYS, Historical Med      albuterol (PROVENTIL HFA, VENTOLIN HFA) 90 mcg/actuation inhaler Take 2 Puffs by inhalation every six (6) hours as needed., Historical Med      fluticasone (FLOVENT HFA) 44 mcg/actuation inhaler Take 1 Puff by inhalation two (2) times a day.  , Alina Chaudhari DO   Emergency Medicine   February 28, 2022, 1:33 PM     This note is dictated utilizing Dragon voice recognition software. Unfortunately this leads to occasional typographical errors using the voice recognition. I apologize in advance if the situation occurs. If questions occur please do not hesitate to contact me directly. Patient was seen  and treated during the context of the COVID-19 pandemic. Contemporary protocols utilized based on the best available evidence, utilizing evolving public health  guidelines and treatment protocols.     Renzo Cornejo DO

## 2022-02-28 LAB
ATRIAL RATE: 95 BPM
CALCULATED P AXIS, ECG09: 66 DEGREES
CALCULATED R AXIS, ECG10: -29 DEGREES
CALCULATED T AXIS, ECG11: 43 DEGREES
DIAGNOSIS, 93000: NORMAL
P-R INTERVAL, ECG05: 146 MS
Q-T INTERVAL, ECG07: 366 MS
QRS DURATION, ECG06: 88 MS
QTC CALCULATION (BEZET), ECG08: 459 MS
VENTRICULAR RATE, ECG03: 95 BPM

## 2022-03-13 ENCOUNTER — HOSPITAL ENCOUNTER (EMERGENCY)
Age: 57
Discharge: OTHER INSTITUTION | End: 2022-03-13
Attending: EMERGENCY MEDICINE
Payer: MEDICAID

## 2022-03-13 ENCOUNTER — APPOINTMENT (OUTPATIENT)
Dept: CT IMAGING | Age: 57
End: 2022-03-13
Attending: EMERGENCY MEDICINE
Payer: MEDICAID

## 2022-03-13 VITALS
TEMPERATURE: 97.8 F | WEIGHT: 155 LBS | HEIGHT: 65 IN | DIASTOLIC BLOOD PRESSURE: 62 MMHG | BODY MASS INDEX: 25.83 KG/M2 | OXYGEN SATURATION: 100 % | HEART RATE: 92 BPM | SYSTOLIC BLOOD PRESSURE: 111 MMHG | RESPIRATION RATE: 21 BRPM

## 2022-03-13 DIAGNOSIS — G08 THROMBOSIS OF SUPERIOR SAGITTAL SINUS: Primary | ICD-10-CM

## 2022-03-13 PROCEDURE — 96375 TX/PRO/DX INJ NEW DRUG ADDON: CPT

## 2022-03-13 PROCEDURE — 99285 EMERGENCY DEPT VISIT HI MDM: CPT

## 2022-03-13 PROCEDURE — 96365 THER/PROPH/DIAG IV INF INIT: CPT

## 2022-03-13 PROCEDURE — 74011250636 HC RX REV CODE- 250/636: Performed by: EMERGENCY MEDICINE

## 2022-03-13 PROCEDURE — 70450 CT HEAD/BRAIN W/O DYE: CPT

## 2022-03-13 RX ORDER — PROCHLORPERAZINE EDISYLATE 5 MG/ML
10 INJECTION INTRAMUSCULAR; INTRAVENOUS
Status: COMPLETED | OUTPATIENT
Start: 2022-03-13 | End: 2022-03-13

## 2022-03-13 RX ORDER — MAGNESIUM SULFATE HEPTAHYDRATE 40 MG/ML
2 INJECTION, SOLUTION INTRAVENOUS ONCE
Status: COMPLETED | OUTPATIENT
Start: 2022-03-13 | End: 2022-03-13

## 2022-03-13 RX ADMIN — SODIUM CHLORIDE 1000 ML: 900 INJECTION, SOLUTION INTRAVENOUS at 21:45

## 2022-03-13 RX ADMIN — MAGNESIUM SULFATE 2 G: 2 INJECTION INTRAVENOUS at 21:46

## 2022-03-13 RX ADMIN — PROCHLORPERAZINE EDISYLATE 10 MG: 5 INJECTION INTRAMUSCULAR; INTRAVENOUS at 21:46

## 2022-03-14 NOTE — ED TRIAGE NOTES
Patient was diagnosed with a brain tumor in [facility] no treatment started yet. Patient is going to neurosurgeon on Tuesday. Today patient present with an anterior headache across the forehead and a posterior headache. Patient states the headache started on Friday. Patient get migraine headache shots outpatient but those are not working for this headache.

## 2022-03-14 NOTE — ED NOTES
TRANSFER - OUT REPORT:    Telephone Verbal report given to Jero Nicole RN by RN April Eason RN(name) on Abdiel Prince  being transferred to [facility]) for routine progression of care       Report consisted of patients Situation, Background, Assessment and   Recommendations(SBAR). Information from the following report(s) SBAR, Kardex, ED Summary, Procedure Summary, Intake/Output, MAR and Accordion was reviewed with the receiving nurse. Opportunity for questions and clarification was provided.       Patient transported with:   Monitor  [facility]

## 2022-03-14 NOTE — ED PROVIDER NOTES
EMERGENCY DEPARTMENT HISTORY AND PHYSICAL EXAM    9:36 PM  Date: 3/13/2022  Patient Name: Ignacio Prescott    History of Presenting Illness     Chief Complaint   Patient presents with    Headache        History Provided By: Patient    HPI: Ignacio Prescott is a 61 y.o. female with history of medical problems as below including known meningioma. Patient is presenting with frontal and subdural headache that started 3 days ago. States that the headaches been gradual and associated with blurry vision. She has been taking her daily migraine medications without any relief. No aggravating or alleviating factors. Reports nausea without vomiting. No double vision, neck stiffness or fever. No history of trauma. Denies dizziness, syncope, weakness or numbness. Patient states that this feels different than her usual migraines. She called her neurosurgeon, Dr. Farnaz Howard at [facility] who recommended that she goes to the ER. The patient states that she has an appointment with her neurosurgeon in 2 days. Location:  Severity:  Timing/course:    Onset/Duration:     PCP: Jon Varela MD    Past History     Past Medical History:  Past Medical History:   Diagnosis Date    Appetite loss 2013    Arthritis     in back    Asthma 2006    Back pain 8/31/2012    Chest pain     Degenerative disc disease, lumbar     DUB (dysfunctional uterine bleeding)     Foot pain, left     GERD (gastroesophageal reflux disease)     Headache(784.0)     migraine variant    Irregular heartbeat     Kidney calculus     Kidney stone     Menorrhagia     Pelvic pain in female     Postlaminectomy syndrome     S/P ankle ligament repair, left 2/7/2014    Stroke ([facility] [address] 1988, 1996, 1998    x3 - no residual    Tobacco abuse     Trichomonas     UTI (lower urinary tract infection)     Wears glasses 2007       Past Surgical History:  Past Surgical History:   Procedure Laterality Date    HX LUMBAR FUSION  2012    L4-L5    HX LUMBAR FUSION  09/15/2021    HX ORTHOPAEDIC Left 2014    ligament reconstruction    HX TONSILLECTOMY      HX TUBAL LIGATION  1996    x2       Family History:  Family History   Problem Relation Age of Onset    Heart Disease Mother     Hypertension Mother    Fernandez Stroke Mother     Hypertension Father     Hypertension Sister     Diabetes Sister        Social History:  Social History     Tobacco Use    Smoking status: Current Some Day Smoker     Packs/day: 0.50     Years: 42.00     Pack years: 21.00     Last attempt to quit: 2013     Years since quittin.2    Smokeless tobacco: Never Used    Tobacco comment: no smoking within 24 hours of [employer] Vaping Use: Never used   Substance Use Topics    Alcohol use: No     Alcohol/week: 0.0 standard drinks    Drug use: No       Allergies: Allergies   Allergen Reactions    Meloxicam Itching    Nortriptyline Other (comments)    Penicillins Rash       Review of Systems   Review of Systems   Eyes: Positive for visual disturbance. Neurological: Positive for headaches. All other systems reviewed and are negative. Physical Exam     Patient Vitals for the past 12 hrs:   Temp Pulse Resp BP SpO2   22 2103 97.9 °F (36.6 °C) 90 16 117/79 97 %       Physical Exam  Vitals and nursing note reviewed. Constitutional:       Appearance: Normal appearance. HENT:      Head: Normocephalic and atraumatic. Eyes:      Extraocular Movements: Extraocular movements intact. Pupils: Pupils are equal, round, and reactive to light. Cardiovascular:      Rate and Rhythm: Normal rate. Pulmonary:      Effort: Pulmonary effort is normal. No respiratory distress. Musculoskeletal:         General: No deformity. Normal range of motion. Cervical back: Normal range of motion and neck supple. No rigidity. Skin:     General: Skin is warm and dry. Neurological:      General: No focal deficit present.       Mental Status: She is alert and oriented to person, place, and time. Cranial Nerves: No cranial nerve deficit. Sensory: No sensory deficit. Motor: No weakness. Coordination: Coordination normal.      Gait: Gait normal.   Psychiatric:         Mood and Affect: Mood normal.         Behavior: Behavior normal.         Diagnostic Study Results     Labs -  No results found for this or any previous visit (from the past 12 hour(s)). Radiologic Studies -   No results found. Medical Decision Making     ED Course: Progress Notes, Reevaluation, and Consults:    9:36 PM Initial assessment performed. The patients presenting problems have been discussed, and they/their family are in agreement with the care plan formulated and outlined with them. I have encouraged them to ask questions as they arise throughout their visit. 10:28 PM  Transfer center was contacted to initiate the transfer. 10:47 PM  Case discussed with Dr. Robe Byers, ED attending at [facility] and the patient has been accepted. Provider Notes (Medical Decision Making): 58-year-old female with history of meningioma presenting with constant headache for the past 3 days. Melania Sevilla appearing on exam and not in distress. States that her headache is different than her usual without any relief from her Topamax or over-the-counter medications. Neuro exam is completely nonfocal.  However given her history of meningioma will obtain a head CT to rule out any changes including bleeding versus mass-effect. She had an MRI and February. We will also treat symptomatically with a migraine cocktail. Results were discussed with the patient and she refused to be transferred to [facility] for an MRI and prefers to go to [facility] where her neurosurgeon is. Informed the patient that this will likely require any surgical intervention however she still prefers to go to [place] and does not want to go to [facility]. Will contact transfer center.   Also hold off on starting anticoagulation until the MRI is done. Procedures:     Critical Care Time:     Vital Signs-Reviewed the patient's vital signs. Reviewed pt's pulse ox reading. EKG: Interpreted by the EP. Time Interpreted:    Rate:    Rhythm:    Interpretation:   Comparison:     Records Reviewed: Nursing Notes, Old Medical Records, Previous Radiology Studies and Previous Laboratory Studies (Time of Review: 9:36 PM)  -I am the first provider for this patient.  -I reviewed the vital signs, available nursing notes, past medical history, past surgical history, family history and social history. Current Outpatient Medications   Medication Sig Dispense Refill    topiramate (TOPAMAX) 100 mg tablet Take 100 mg at bedtime  Indications: migraine prevention 90 Tablet 1    metoprolol succinate (TOPROL-XL) 100 mg tablet 1 tablet      hydroCHLOROthiazide (HYDRODIURIL) 25 mg tablet Take 25 mg by mouth daily.  fremanezumab-vfrm (Ajovy Autoinjector) 225 mg/1.5 mL auto-injector 4.5 mL by SubCUTAneous route every three (3) months. (Patient not taking: Reported on 2/18/2022) 4.5 mL 3    oxyCODONE IR (ROXICODONE) 5 mg immediate release tablet Take 5 mg by mouth every six (6) hours as needed for Pain. (Patient not taking: Reported on 2/3/2022)      acetaminophen (TYLENOL) 325 mg tablet Take 2 Tablets by mouth every four (4) hours as needed for Pain. 20 Tablet 0    gabapentin (NEURONTIN) 600 mg tablet Take 1 Tablet by mouth three (3) times daily. Max Daily Amount: 1,800 mg. 270 Tablet 2    Advair Diskus 250-50 mcg/dose diskus inhaler INHALE 1 PUFF BY MOUTH TWICE DAILY      omeprazole (PRILOSEC) 20 mg capsule TAKE 1 CAPSULE BY MOUTH ONCE DAILY 30 MINUTES BEFORE MORNING MEAL      propranolol LA (INDERAL LA) 80 mg SR capsule nightly.       medroxyPROGESTERone (DEPO-PROVERA) 150 mg/mL syrg INJECT 1ML INTRAMUSCULARLY EVERY 90 DAYS      albuterol (PROVENTIL HFA, VENTOLIN HFA) 90 mcg/actuation inhaler Take 2 Puffs by inhalation every six (6) hours as needed.  fluticasone (FLOVENT HFA) 44 mcg/actuation inhaler Take 1 Puff by inhalation two (2) times a day. Clinical Impression     Clinical Impression: No diagnosis found. Disposition: transfer      This note was dictated utilizing voice recognition software which may lead to typographical errors. I apologize in advance if the situation occurs. If questions arise please do not hesitate to contact me or call our department.     Gorman Oppenheim, MD  9:36 PM

## 2022-03-14 NOTE — PROGRESS NOTES
Patient to be transferred to [facility] ER. Report called to Emelyn Ayon RN. Pending arrival of EMS for transfer. Will notify of departure from our facility.

## 2022-03-16 ENCOUNTER — TELEPHONE (OUTPATIENT)
Dept: NEUROLOGY | Age: 57
End: 2022-03-16

## 2022-04-21 ENCOUNTER — TELEPHONE (OUTPATIENT)
Dept: NEUROLOGY | Age: 57
End: 2022-04-21

## 2022-04-27 PROBLEM — D32.9 MENINGIOMA (HCC): Status: ACTIVE | Noted: 2022-04-27

## 2022-05-12 ENCOUNTER — TELEPHONE (OUTPATIENT)
Dept: NEUROLOGY | Age: 57
End: 2022-05-12

## 2022-05-19 NOTE — TELEPHONE ENCOUNTER
Notes scanned for provider's review.
Notes scanned for provider's review.
Office note rec and scanned in pt chart
DISCHARGE

## 2022-06-01 ENCOUNTER — OFFICE VISIT (OUTPATIENT)
Dept: NEUROLOGY | Age: 57
End: 2022-06-01
Payer: MEDICAID

## 2022-06-01 VITALS
HEART RATE: 100 BPM | RESPIRATION RATE: 22 BRPM | OXYGEN SATURATION: 99 % | WEIGHT: 144.6 LBS | BODY MASS INDEX: 24.69 KG/M2 | DIASTOLIC BLOOD PRESSURE: 80 MMHG | HEIGHT: 64 IN | SYSTOLIC BLOOD PRESSURE: 110 MMHG

## 2022-06-01 DIAGNOSIS — Z86.018 HISTORY OF MENINGIOMA: ICD-10-CM

## 2022-06-01 DIAGNOSIS — Z98.890 S/P CRANIOTOMY: ICD-10-CM

## 2022-06-01 DIAGNOSIS — G43.009 MIGRAINE WITHOUT AURA AND WITHOUT STATUS MIGRAINOSUS, NOT INTRACTABLE: Primary | ICD-10-CM

## 2022-06-01 PROCEDURE — 99214 OFFICE O/P EST MOD 30 MIN: CPT | Performed by: NURSE PRACTITIONER

## 2022-06-01 RX ORDER — FREMANEZUMAB-VFRM 225 MG/1.5ML
675 INJECTION SUBCUTANEOUS
Qty: 4.5 ML | Refills: 3 | Status: SHIPPED | OUTPATIENT
Start: 2022-06-01 | End: 2022-09-14

## 2022-06-01 NOTE — PROGRESS NOTES
[facility]  [address], [address], [place], [address]  [address], [address]  Office:  [phone]  Fax: [phone]  Chief Complaint   Patient presents with    Migraine     follow up       This is a 79-year-old female who presents for follow-up migraine. In the interim had a craniotomy completed by Dr. Jaclyn Campbell at [facility] in April. Headaches have been controlled. Does endorse aversion to food and feels this is the Topamax. Is maintained on Ajovy and did her last injection on 5/20. She is headache free and overall feeling very well. No other concerns at this time. Past Medical History:   Diagnosis Date    Appetite loss 2013    Arthritis     in back    Asthma 2006    Back pain 8/31/2012    Chest pain     Degenerative disc disease, lumbar     DUB (dysfunctional uterine bleeding)     Dysphagia     FROM RECENT POSSIBLE STROKE    Foot pain, left     GERD (gastroesophageal reflux disease)     Headache(784.0)     migraine variant    Irregular heartbeat     Kidney calculus     Kidney stone     Menorrhagia     Pelvic pain in female     Postlaminectomy syndrome     S/P ankle ligament repair, left 2/7/2014    Stroke ([facility] [address] 1988, 1996, 1998    x3 - no residual  PT. THINKS SHE MAY HAVE HAD ONE 2 WEEKS AGO    Tobacco abuse     Trichomonas     UTI (lower urinary tract infection)     Wears glasses 2007       Past Surgical History:   Procedure Laterality Date    HX LUMBAR FUSION  2012    L4-L5    HX LUMBAR FUSION  09/15/2021    HX ORTHOPAEDIC Left 02/07/2014    ligament reconstruction  ANKLE    HX TONSILLECTOMY      HX TUBAL LIGATION  1996    x2       Current Outpatient Medications   Medication Sig Dispense Refill    fremanezumab-vfrm (Ajovy Autoinjector) 225 mg/1.5 mL auto-injector 4.5 mL by SubCUTAneous route every three (3) months. 4.5 mL 3    magnesium oxide (MAG-OX) 400 mg tablet Take 1 Tablet by mouth daily.  30 Tablet 0    spironolactone (ALDACTONE) 25 mg tablet Take 1 Tablet by mouth daily. 30 Tablet 0    cyclobenzaprine (FLEXERIL) 10 mg tablet 10 mg three (3) times daily as needed.  DULoxetine (CYMBALTA) 20 mg capsule Take 20 mg by mouth daily.  sulindac (CLINORIL) 200 mg tablet two (2) times a day.  metoprolol succinate (TOPROL-XL) 100 mg tablet Take 100 mg by mouth daily.  oxyCODONE IR (ROXICODONE) 5 mg immediate release tablet Take 5 mg by mouth every six (6) hours as needed for Pain.  Advair Diskus 250-50 mcg/dose diskus inhaler INHALE 1 PUFF BY MOUTH TWICE DAILY      omeprazole (PRILOSEC) 20 mg capsule TAKE 1 CAPSULE BY MOUTH ONCE DAILY 30 MINUTES BEFORE MORNING MEAL      medroxyPROGESTERone (DEPO-PROVERA) 150 mg/mL syrg INJECT 1ML INTRAMUSCULARLY EVERY 90 DAYS      albuterol (PROVENTIL HFA, VENTOLIN HFA) 90 mcg/actuation inhaler Take 2 Puffs by inhalation every six (6) hours as needed.  fluticasone (FLOVENT HFA) 44 mcg/actuation inhaler Take 1 Puff by inhalation two (2) times a day.         dexAMETHasone (DECADRON) 4 mg tablet 4mg q6 1 day tid 1 day bid 1 day daily 1 day (Patient not taking: Reported on 2022) 10 Tablet 0        Allergies   Allergen Reactions    Topiramate Nausea Only and Other (comments)     HEADACHE, LOSS OF SMELL, STRANGE TEST IN MOUTH    Meloxicam Itching    Nortriptyline Other (comments)     MOOD SWINGS    Penicillins Rash       Social History     Tobacco Use    Smoking status: Current Some Day Smoker     Packs/day: 0.50     Years: 42.00     Pack years: 21.00     Last attempt to quit: 2013     Years since quittin.5    Smokeless tobacco: Never Used    Tobacco comment: no smoking within 24 hours of [facility] [employer] Vaping Use: Never used   Substance Use Topics    Alcohol use: No     Alcohol/week: 0.0 standard drinks    Drug use: No       Family History   Problem Relation Age of Onset    Heart Disease Mother     Hypertension Mother     Stroke Mother     Hypertension Father     Hypertension Sister     Diabetes Sister        Review of Systems  GENERAL: Denies fever or fatigue  CARDIAC: No CP or SOB  PULMONARY: No cough of SOB  MUSCULOSKELETAL: No new joint pain  NEURO: SEE HPI    Examination  Visit Vitals  /80 (BP 1 Location: Left upper arm, BP Patient Position: Sitting, BP Cuff Size: Adult)   Pulse 100   Resp 22   Ht 5' 3.5\" (1.613 m)   Wt 65.6 kg (144 lb 9.6 oz)   LMP 04/26/2015   SpO2 99%   BMI 25.21 kg/m²       This is a very pleasant 50-year-old female. She is alert and in no apparent distress. Full fund of knowledge. Speech is clear. No facial asymmetry. No signs of ataxia or incoordination. Freely moves the upper and lower extremities. Ambulates steady without use of assistive device. Impression/Plan  Dionicio Pereyra is a 61 y.o. female whose history and physical are consistent with status postcraniotomy and resection of meningioma complete in April by Dr. Grant Quintanilla. Since then doing very well. Is headache free. Discussed stomach upset on Topamax and recommendation is to wean off this. Patient verbalized understanding. She will maintain on Ajovy. Ensure adequate supply. New rx sent. Continue to monitor headaches and follow-up in 3 months or sooner as need be. All questions addressed and patient is agreeable with plan of care. Diagnoses and all orders for this visit:    1. Migraine without aura and without status migrainosus, not intractable    2. S/P craniotomy    3. History of meningioma    Other orders  -     fremanezumab-vfrm (Ajovy Autoinjector) 225 mg/1.5 mL auto-injector; 4.5 mL by SubCUTAneous route every three (3) months. I spent at least 30 minutes on this visit with this established patient. Signed By: Zac Luna NP        PLEASE NOTE:   Portions of this document may have been produced using voice recognition software. Unrecognized errors in transcription may be present.

## 2022-06-17 NOTE — ED NOTES
I have reviewed discharge instructions with the patient. The patient verbalized understanding. Discharge medications reviewed with patient and appropriate educational materials and side effects teaching were provided.  Patient armband removed and shredded .

## 2022-07-17 PROBLEM — L03.811 ABSCESS OR CELLULITIS OF SCALP: Status: ACTIVE | Noted: 2022-07-17

## 2022-07-17 PROBLEM — T81.49XA POSTOPERATIVE WOUND INFECTION: Status: ACTIVE | Noted: 2022-07-17

## 2022-10-18 ENCOUNTER — OFFICE VISIT (OUTPATIENT)
Dept: NEUROLOGY | Age: 57
End: 2022-10-18
Payer: MEDICAID

## 2022-10-18 VITALS
WEIGHT: 150.2 LBS | RESPIRATION RATE: 20 BRPM | OXYGEN SATURATION: 97 % | SYSTOLIC BLOOD PRESSURE: 118 MMHG | HEART RATE: 96 BPM | HEIGHT: 64 IN | DIASTOLIC BLOOD PRESSURE: 80 MMHG | BODY MASS INDEX: 25.64 KG/M2

## 2022-10-18 DIAGNOSIS — G43.719 INTRACTABLE CHRONIC MIGRAINE WITHOUT AURA AND WITHOUT STATUS MIGRAINOSUS: Primary | ICD-10-CM

## 2022-10-18 DIAGNOSIS — Z98.890 S/P CRANIOTOMY: ICD-10-CM

## 2022-10-18 DIAGNOSIS — Z86.018 HISTORY OF MENINGIOMA: ICD-10-CM

## 2022-10-18 PROCEDURE — 99215 OFFICE O/P EST HI 40 MIN: CPT | Performed by: NURSE PRACTITIONER

## 2022-10-18 RX ORDER — ATOGEPANT 30 MG/1
30 TABLET ORAL DAILY
Qty: 30 MG | Refills: 5 | Status: SHIPPED | OUTPATIENT
Start: 2022-10-18 | End: 2022-10-18 | Stop reason: SDUPTHER

## 2022-10-18 RX ORDER — ATOGEPANT 30 MG/1
30 TABLET ORAL DAILY
Qty: 30 MG | Refills: 5 | Status: SHIPPED | OUTPATIENT
Start: 2022-10-18 | End: 2022-10-18

## 2022-10-18 RX ORDER — ATOGEPANT 30 MG/1
30 TABLET ORAL DAILY
Qty: 30 MG | Refills: 5 | Status: SHIPPED | OUTPATIENT
Start: 2022-10-18

## 2022-10-18 RX ORDER — GUAIFENESIN 100 MG/5ML
81 LIQUID (ML) ORAL DAILY
Qty: 30 TABLET | Refills: 5 | Status: SHIPPED | OUTPATIENT
Start: 2022-10-18

## 2022-10-18 NOTE — PROGRESS NOTES
[address] [facility], [address]  Office:  [phone]  Fax: [phone]  Chief Complaint   Patient presents with    Migraine       HPI: Eli Briggs presents in follow-up for migraines. She was seen here in February 2022 by [facility] for evaluation of headaches. Reported long history of headaches. Headaches began around 79. History of migraine headaches. She was having a daily headache. Head pain located frontal and can be located posterior. More affected on the left side. Can encompass the left orbit intermittently. Head pain described as throbbing, pressure, and sharp pain at variable times. Headaches last all day. Headaches associated with light sensitivity, blurred vision that is transient, and lightheadedness. She reported the lightheadedness started about 1 week ago at that time. She was then seen in the room spinning. Denied loss of consciousness. Symptoms were not persisting for an extended period of time. Reported history of stroke in 1988 and that is when her headache started. She was taken propranolol 80 mg daily for migraine prevention. In the past she was on Depakote and nortriptyline and reported mood side effects. Significant mood swings with Depakote. Did not report routine use of OTC analgesics. Endorsed inconsistent sleep. Denied history of anxiety or depression. She has family history of headaches. She was smoking half a pack of cigarettes per day. She has had back surgery in September 2021 to her lumbar spine done by Dr. Jackelyn Tolbert. She had a head CT in December 2021 which reported a dural based enhancing mass along the right superior falx, favorable for meningioma. Old lacunar infarcts in bilateral thalami. The plan was for MRI of the brain with and without contrast for further evaluation of the mass. She was started on Topamax and the time being while awaiting starting Ajovy.   Vascular risk factor management. Was to add aspirin 81 mg. MRI brain with and without contrast in February 2022 reported moderately sized avidly enhancing dural based mass lesion at the left parietal area, straddling the posterior falx. Reported to be most typical of a meningioma. It was possible that there was a secondary plaque shaped focus along the lateral margin of the left side of tentorium. Minor amount of probable small vessel ischemic sequela. The Topamax was increased. She had been referred to neurosurgery. She was last seen here on 6/1/2022 by KATHARINE Cherry. It was noted that in the interim she had a craniotomy by Dr. Laureen Thomas at [facility] in April. It was noted that her headaches have been controlled. She reported aversion to food and feels that this is the Topamax. She continues on Ajovy injections. She was noted to be headache free and overall feeling very well. She was to wean off Topamax and continue Ajovy. She presents today in follow-up. She is with her . Reports the Ajovy monthly injections only seemed to last for about 2 weeks. She is off Topamax. She reports she is allergic to Topamax, couldn't eat anything, no appetite on it. She is on bupropion, recently started for smoking cessation, prescribed in September. She is reducing smoking. She does not smoke the other day when she wear the nicotine patch. Had 3 total brain surgeries. Initial one in April for tumor resection, July for infection, then cranioplasty September 30th with her neurosurgeon and plastic surgeon. Was told the Ajovy lowered her immune system. Had a PICC line for ABX at home for 6 weeks. Ajovy was stopped, has been stopped since July. Still thinks it only helped for 2 weeks the whole time, even before any surgery. Was getting injection site reaction that was lasting with the Ajovy injections.     She is on gabapentin for her back 600 mg daily but may not take it if her back isn't hurting (has had 3 back surgeries). She is on metoprolol  mg every evening for BP. Also on HCTZ for BP but had metoprolol added. Side effects with nortriptyline- bad mood swings, Depakote- too fatigued. Reports Depakote was every 8 hours. He reports that Depakote was not as bad but she would have to nap every day. She worked as a  in [employer] for 25 years. She is on duloxetine for her back. Headaches are the same as before. Takes meds and they don't help. Took a pain pill 7.5 Percocet, Excedrin migraine. Was on 5 mg Percocet, now 7.5. Since July. May take it once a day. Doesn't like the way it makes her feel. From the neurosurgeon Dr. Cameron Mims. Neither that or Excedrin migraine helps. The headaches are located frontal, more toward the left. No associated weakness, speech change, or vision change. +nausea. No vomiting. Headaches can last to the next day; she has had the same headache since yesterday. They can get to 10/10 like it is now. Keeps her from sleeping. Dull pain. She is not taking aspirin currently. Had CP last week, left arm involved, going to cardiologist next month. Last imaging: CT head noncontrast on 10/1/2022 reported status post cranioplasty at the vertex, small amounts of extra-axial air and fluid at the cranioplasty. Old lacunar infarcts left cerebellum and bilateral thalamus.     Past Medical History:   Diagnosis Date    Appetite loss 2013    Arthritis     in back    Asthma 2006    Back pain 08/31/2012    Chest pain     Degenerative disc disease, lumbar     DUB (dysfunctional uterine bleeding)     Dysphagia     FROM RECENT POSSIBLE STROKE    Foot pain, left     GERD (gastroesophageal reflux disease)     Headache(784.0)     migraine variant    Hypertension     Irregular heartbeat     Kidney calculus     Kidney stone     Meningioma (HCC)     Menorrhagia     Pelvic pain in female     Postlaminectomy syndrome     S/P ankle ligament repair, left 02/07/2014    Stroke ([facility] [address] 1988, 1996, 1998    x3 - no residual  PT. THINKS SHE MAY HAVE HAD ONE 2 WEEKS AGO    Tobacco abuse     Trichomonas     Urinary urgency     UTI (lower urinary tract infection)     Wears glasses 2007    Wound infection     PARIETAL WOUND       Past Surgical History:   Procedure Laterality Date    HX CRANIOTOMY  04/2022    HX LUMBAR FUSION  2012    L4-L5    HX LUMBAR FUSION  09/15/2021    HX ORTHOPAEDIC Left 02/07/2014    ligament reconstruction  ANKLE    HX OTHER SURGICAL  07/2022    I&D PARIETAL WOUND    HX TONSILLECTOMY      HX TUBAL LIGATION  1996    x2       Current Outpatient Medications   Medication Sig Dispense Refill    aspirin 81 mg chewable tablet Take 1 Tablet by mouth daily. 30 Tablet 5    atogepant (Qulipta) 30 mg tab Take 30 mg by mouth daily. Indications: migraine prevention 30 mg 5    oxybutynin (DITROPAN) 5 mg tablet Take 5 mg by mouth daily. DULoxetine (CYMBALTA) 20 mg capsule Take 20 mg by mouth daily. potassium chloride SA (MICRO-K) 10 mEq capsule Take 10 mEq by mouth daily. buPROPion SR (WELLBUTRIN SR) 150 mg SR tablet Take  by mouth daily. lactulose (CHRONULAC) 10 gram/15 mL solution Take  by mouth as needed. gabapentin (NEURONTIN) 600 mg tablet Take  by mouth daily. hydroCHLOROthiazide (HYDRODIURIL) 25 mg tablet Take 25 mg by mouth daily. magnesium oxide (MAG-OX) 400 mg tablet Take 1 Tablet by mouth daily. 30 Tablet 0    cyclobenzaprine (FLEXERIL) 10 mg tablet 10 mg three (3) times daily as needed. sulindac (CLINORIL) 200 mg tablet two (2) times a day. metoprolol succinate (TOPROL-XL) 100 mg tablet Take 100 mg by mouth every evening. Advair Diskus 250-50 mcg/dose diskus inhaler INHALE 1 PUFF BY MOUTH TWICE DAILY      omeprazole (PRILOSEC) 20 mg capsule TAKE 1 CAPSULE BY MOUTH ONCE DAILY 30 MINUTES BEFORE MORNING MEAL      albuterol (PROVENTIL HFA, VENTOLIN HFA) 90 mcg/actuation inhaler Take 2 Puffs by inhalation every six (6) hours as needed. fluticasone propionate (FLOVENT HFA) 44 mcg/actuation inhaler Take 1 Puff by inhalation two (2) times a day. Allergies   Allergen Reactions    Topiramate Nausea Only and Other (comments)     HEADACHE, LOSS OF SMELL, STRANGE TEST IN MOUTH    Hydrocodone Nausea and Vomiting    Meloxicam Itching    Nortriptyline Other (comments)     MOOD SWINGS    Penicillins Rash       Social History     Tobacco Use    Smoking status: Every Day     Packs/day: 0.50     Years: 42.00     Pack years: 21.00     Types: Cigarettes    Smokeless tobacco: Never    Tobacco comments:     no smoking within 24 hours of dos   Vaping Use    Vaping Use: Never used   Substance Use Topics    Alcohol use: Not Currently    Drug use: No       Family History   Problem Relation Age of Onset    Heart Disease Mother     Hypertension Mother     Stroke Mother     Hypertension Father     Hypertension Sister     Diabetes Sister        Review of Systems:  GENERAL: Denies fever or fatigue  CARDIAC: No CP or SOB  PULMONARY: No cough or SOB  MUSCULOSKELETAL: No new joint pain  NEURO: SEE HPI. +HA currently. Physical Examination:  Visit Vitals  /80 (BP 1 Location: Left upper arm, BP Patient Position: Sitting, BP Cuff Size: Adult)   Pulse 96   Resp 20   Ht 5' 3.5\" (1.613 m)   Wt 68.1 kg (150 lb 3.2 oz)   LMP  (LMP Unknown)   SpO2 97%   BMI 26.19 kg/m²       Alert, in NAD. Heart is regular. Oriented x3. Speech is fluent. Speech clear. EOMs are full, PERRL, VFFTC, no nystagmus. Mild R lower facial weakness. Strength and tone are normal. No drift of the bilateral upper extremities. Fine finger movements symmetrical. FNF intact bilaterally. DTRs +2.  Gait is normal.      Impression/Plan: This is a 59-year-old female who presents in follow-up for migraines.   She had meningioma to the right posterior parafalcine region overlying the right parietal vertex, status postcraniotomy for resection in April 2022, then surgery in July due to reported infection, then cranioplasty in September. She has been following up with her neurosurgeon. She has follow-up with her plastic surgeon. She reports that the migraines continue. They are very bothersome, can be at 10 in severity and can last more than a day. She did not think the Ajovy helped but for the initial 2 weeks after each injection. She tried it for about 3 months at least.  This was stopped around the time of the infection. She cannot take Topamax due to lack of appetite on the medication. She had side effect of bad mood swings with nortriptyline. Depakote caused fatigue in the past.  She is currently on metoprolol and BP is controlled. She is on Cymbalta and gabapentin for her back. She is on bupropion for smoking cessation. We will start [place] for migraine preventative. Advised on administration potential side effects of the medication. Advised against taking analgesics, whether over-the-counter or prescribed, more than 2-3 times out of the week to treat the headache. Hopefully we can start [place] for help as migraine preventative due to the side effects to several preventatives in the past and due to her current medications. This would be preferable to another injectable because she does not like shots. Another consideration could be to adjust antihypertensive medications to increase the beta-blocker. Or retry Depakote but she did not think it helped. Resume aspirin 81 mg for secondary stroke prevention. She is working on quitting smoking. Follow up in 2 months. Diagnoses and all orders for this visit:    1. Intractable chronic migraine without aura and without status migrainosus    2. S/P craniotomy    3. History of meningioma    Other orders  -     aspirin 81 mg chewable tablet; Take 1 Tablet by mouth daily. -     atogepant (Qulipta) 30 mg tab; Take 30 mg by mouth daily. Indications: migraine prevention    Total time 40 minutes with 25 minutes spent in counseling.      Signed By: Clint Goodpasture Lisa Kasper NP        PLEASE NOTE:   Portions of this document may have been produced using voice recognition software. Unrecognized errors in transcription may be present.

## 2022-10-19 ENCOUNTER — TELEPHONE (OUTPATIENT)
Dept: NEUROLOGY | Age: 57
End: 2022-10-19

## 2022-10-19 DIAGNOSIS — Z98.890 S/P CRANIOTOMY: ICD-10-CM

## 2022-10-19 DIAGNOSIS — R42 DIZZY SPELLS: ICD-10-CM

## 2022-10-19 DIAGNOSIS — R42 DIZZY SPELLS: Primary | ICD-10-CM

## 2022-10-19 DIAGNOSIS — Z86.018 HISTORY OF MENINGIOMA: ICD-10-CM

## 2022-10-19 RX ORDER — DIVALPROEX SODIUM 500 MG/1
500 TABLET, EXTENDED RELEASE ORAL DAILY
Qty: 30 TABLET | Refills: 3 | Status: SHIPPED | OUTPATIENT
Start: 2022-10-19

## 2022-10-20 ENCOUNTER — TELEPHONE (OUTPATIENT)
Dept: NEUROLOGY | Age: 57
End: 2022-10-20

## 2022-10-21 ENCOUNTER — HOSPITAL ENCOUNTER (OUTPATIENT)
Dept: NEUROLOGY | Age: 57
Discharge: HOME | End: 2022-10-21
Attending: NURSE PRACTITIONER
Payer: MEDICAID

## 2022-10-21 PROCEDURE — 95816 EEG AWAKE AND DROWSY: CPT

## 2022-10-30 NOTE — PROCEDURES
METHOD:    This is a 20 channel digital electroencephalogram utilizing the 10-20 International System of Electrode Placement with 1 channel of EKG recording. The record was read using reformatted bipolar and referential electrode montages. The patient is described as awake and cooperative but appeared to have talked at times during the study. RESULTS:    Background: In the most alert state, there is a symmetric well formed 12-15 Hz, 20-30 uV alpha activity in the occipital region which is normally reactive and symmetric. There were no noted abnormal asymmetries, regions of focal slowing or epileptiform discharges throughout the study. However diffusely increased beta activity was noted. Sleep: sleep was not recorded. Photic stimulation: normal response. EKG channel did not show any significant dysrhythmia. IMPRESSION:   This is essentially a normal EEG. The above noted increased diffuse beta activity is most commonly an effect of centrally acting medications. No seizure activity was noted. Merced Ruby.  Donald Peres 35. Neurophysiology  Neuromuscular Medicine

## 2022-11-16 ENCOUNTER — OFFICE VISIT (OUTPATIENT)
Dept: NEUROLOGY | Age: 57
End: 2022-11-16
Payer: MEDICAID

## 2022-11-16 VITALS
WEIGHT: 156 LBS | HEART RATE: 106 BPM | BODY MASS INDEX: 26.63 KG/M2 | DIASTOLIC BLOOD PRESSURE: 70 MMHG | HEIGHT: 64 IN | RESPIRATION RATE: 18 BRPM | OXYGEN SATURATION: 99 % | SYSTOLIC BLOOD PRESSURE: 118 MMHG

## 2022-11-16 DIAGNOSIS — Z86.018 HISTORY OF MENINGIOMA: ICD-10-CM

## 2022-11-16 DIAGNOSIS — R41.9 COGNITIVE COMPLAINTS: ICD-10-CM

## 2022-11-16 DIAGNOSIS — R51.9 SEVERE HEADACHE: ICD-10-CM

## 2022-11-16 DIAGNOSIS — Z86.59 HISTORY OF ANXIETY: ICD-10-CM

## 2022-11-16 DIAGNOSIS — R53.83 FATIGUE, UNSPECIFIED TYPE: ICD-10-CM

## 2022-11-16 DIAGNOSIS — Z98.890 S/P CRANIOTOMY: ICD-10-CM

## 2022-11-16 DIAGNOSIS — Z86.59 HISTORY OF DEPRESSION: ICD-10-CM

## 2022-11-16 DIAGNOSIS — G43.719 INTRACTABLE CHRONIC MIGRAINE WITHOUT AURA AND WITHOUT STATUS MIGRAINOSUS: ICD-10-CM

## 2022-11-16 DIAGNOSIS — R42 DIZZY SPELLS: ICD-10-CM

## 2022-11-16 DIAGNOSIS — R42 DIZZY SPELLS: Primary | ICD-10-CM

## 2022-11-16 DIAGNOSIS — R06.83 SNORING: ICD-10-CM

## 2022-11-16 PROCEDURE — 99215 OFFICE O/P EST HI 40 MIN: CPT | Performed by: NURSE PRACTITIONER

## 2022-11-16 RX ORDER — ATOGEPANT 30 MG/1
30 TABLET ORAL DAILY
Qty: 30 MG | Refills: 5 | Status: SHIPPED | OUTPATIENT
Start: 2022-11-16 | End: 2022-11-16 | Stop reason: SDUPTHER

## 2022-11-16 RX ORDER — ATOGEPANT 30 MG/1
30 TABLET ORAL DAILY
Qty: 30 MG | Refills: 5 | Status: SHIPPED | OUTPATIENT
Start: 2022-11-16

## 2022-11-16 NOTE — PROGRESS NOTES
[address] Ezequiel Stokes, [address]  Office:  [phone]  Fax: [phone]  Chief Complaint   Patient presents with    Migraine     Follow up       HPI: Ricky Moreno presents in follow-up for migraines. She was last seen here on 10/18/2022. After the last appointment she saw her neurosurgeon the next day and was noted to have a headache that morning which felt like an explosion. CT head 10/19/2022 without acute intracranial findings. EEG 10/21/2022 essentially normal; noted to have increased diffuse beta activity most commonly and effective centrally acting medications. We restarted Depakote. She is with her . Reports \"explosions\" going on in her head. It started the day she saw the neurosurgeon in follow-up, the morning of. It's been happening every day since then. Usually twice a day, sometimes 3. Sunday it stayed, took one of the pain pills from surgery and then Excedrin migraine a couple hours later, still no help. Feels like an explosion. In the whole head. No other associated symptoms. The other headaches are located in the front of the head, no associated N/V, but sometimes associated dizziness. No associated speech change, vision change, weakness, eye redness, eye tearing, nasal congestion, or rhinorrhea. Does not know the duration but endorses it lasts hours. For dizziness, can go lay down, cool off, take a nap, and it may still be there. Dizziness described as lightheadedness. Does not eat breakfast, eats lunch sometimes, eats dinner. They were supposed to call her back from the pharmacy after contacting the insurance company, but they didn't. Not taking prn pain meds often. Taking aspirin 81 mg. The pain does not feel like a spasm. Endorses neck stiffness. The next MRI brain is in a year. Going to have a stress test on Monday. Seeing a cardiologist at [place]. Has a BP cuff, monitors, reportedly BP fine.   On Depakote  mg daily, it did not touch it, was on it in the past.  Reports short term memory issues. Daughter on speaker phone wants to make sure she remembers to tell about the memory issues and her dizziness. Example: she tells him something a few days ago and then he brings it up and she doesn't remember it. No issues with ADLs. Rides a motorcycle. Sleeping well. No coughing/choking in sleep. Snoring lightly per . Endorses daytime fatigue. Endorses problems with mental health including depression anxiety. Having a hard time dealing with not working anymore. She was a  for many years at the [employer]. Denies suicidal ideations. From previous encounter on 10/18/2022:  \"Martha Raines presents in follow-up for migraines. She was seen here in February 2022 by [facility] for evaluation of headaches. Reported long history of headaches. Headaches began around 79. History of migraine headaches. She was having a daily headache. Head pain located frontal and can be located posterior. More affected on the left side. Can encompass the left orbit intermittently. Head pain described as throbbing, pressure, and sharp pain at variable times. Headaches last all day. Headaches associated with light sensitivity, blurred vision that is transient, and lightheadedness. She reported the lightheadedness started about 1 week ago at that time. She was then seen in the room spinning. Denied loss of consciousness. Symptoms were not persisting for an extended period of time. Reported history of stroke in 1988 and that is when her headache started. She was taken propranolol 80 mg daily for migraine prevention. In the past she was on Depakote and nortriptyline and reported mood side effects. Significant mood swings with Depakote. Did not report routine use of OTC analgesics. Endorsed inconsistent sleep. Denied history of anxiety or depression. She has family history of headaches. She was smoking half a pack of cigarettes per day. She has had back surgery in September 2021 to her lumbar spine done by Dr. Shelia Macario. She had a head CT in December 2021 which reported a dural based enhancing mass along the right superior falx, favorable for meningioma. Old lacunar infarcts in bilateral thalami. The plan was for MRI of the brain with and without contrast for further evaluation of the mass. She was started on Topamax and the time being while awaiting starting Ajovy. Vascular risk factor management. Was to add aspirin 81 mg. MRI brain with and without contrast in February 2022 reported moderately sized avidly enhancing dural based mass lesion at the left parietal area, straddling the posterior falx. Reported to be most typical of a meningioma. It was possible that there was a secondary plaque shaped focus along the lateral margin of the left side of tentorium. Minor amount of probable small vessel ischemic sequela. The Topamax was increased. She had been referred to neurosurgery. She was last seen here on 6/1/2022 by KATHARINE Cherry. It was noted that in the interim she [facility] It was noted that her headaches have been controlled. She reported aversion to food and feels that this is the Topamax. She continues on Ajovy injections. She was noted to be headache free and overall feeling very well. She was to wean off Topamax and continue Ajovy. She presents today in follow-up. She is with her . Reports the Ajovy monthly injections only seemed to last for about 2 weeks. She is off Topamax. She reports she is allergic to Topamax, couldn't eat anything, no appetite on it. She is on bupropion, recently started for smoking cessation, prescribed in September. She is reducing smoking. She does not smoke the other day when she wear the nicotine patch. Had 3 total brain surgeries.   Initial one in April for tumor resection, July for infection, then cranioplasty September 30th with her neurosurgeon and plastic surgeon. Was told the Ajovy lowered her immune system. Had a PICC line for ABX at home for 6 weeks. Ajovy was stopped, has been stopped since July. Still thinks it only helped for 2 weeks the whole time, even before any surgery. Was getting injection site reaction that was lasting with the Ajovy injections. She is on gabapentin for her back 600 mg daily but may not take it if her back isn't hurting (has had 3 back surgeries). She is on metoprolol  mg every evening for BP. Also on HCTZ for BP but had metoprolol added. Side effects with nortriptyline- bad mood swings, Depakote- too fatigued. Reports Depakote was every 8 hours. He reports that Depakote was not as bad but she would have to nap every day. She worked as a  in [employer] for 25 years. She is on duloxetine for her back. Headaches are the same as before. Takes meds and they don't help. Took a pain pill 7.5 Percocet, Excedrin migraine. Was on 5 mg Percocet, now 7.5. Since July. May take it once a day. Doesn't like the way it makes her feel. From the neurosurgeon Dr. Philippe Bashir. Neither that or Excedrin migraine helps. The headaches are located frontal, more toward the left. No associated weakness, speech change, or vision change. +nausea. No vomiting. Headaches can last to the next day; she has had the same headache since yesterday. They can get to 10/10 like it is now. Keeps her from sleeping. Dull pain. She is not taking aspirin currently. Had CP last week, left arm involved, going to cardiologist next month. Last imaging: CT head noncontrast on 10/1/2022 reported status post cranioplasty at the vertex, small amounts of extra-axial air and fluid at the cranioplasty. Old lacunar infarcts left cerebellum and bilateral thalamus. \"    Past Medical History:   Diagnosis Date    Appetite loss 2013    Arthritis     in back Asthma 2006    Back pain 08/31/2012    Chest pain     Degenerative disc disease, lumbar     DUB (dysfunctional uterine bleeding)     Dysphagia     FROM RECENT POSSIBLE STROKE    Foot pain, left     GERD (gastroesophageal reflux disease)     Headache(784.0)     migraine variant    Hypertension     Irregular heartbeat     Kidney calculus     Kidney stone     Meningioma [facility])     Menorrhagia     Pelvic pain in female     Postlaminectomy syndrome     S/P ankle ligament repair, left 02/07/2014    Stroke ([address] 1988, 1996, 1998    x3 - no residual  PT. THINKS SHE MAY HAVE HAD ONE 2 WEEKS AGO    Tobacco abuse     Trichomonas     Urinary urgency     UTI (lower urinary tract infection)     Wears glasses 2007    Wound infection     PARIETAL WOUND       Past Surgical History:   Procedure Laterality Date    HX CRANIOTOMY  04/2022    HX LUMBAR FUSION  2012    L4-L5    HX LUMBAR FUSION  09/15/2021    HX ORTHOPAEDIC Left 02/07/2014    ligament reconstruction  ANKLE    HX OTHER SURGICAL  07/2022    I&D PARIETAL WOUND    HX TONSILLECTOMY      HX TUBAL LIGATION  1996    x2       Current Outpatient Medications   Medication Sig Dispense Refill    rimegepant (NURTEC) 75 mg disintegrating tablet Take 1 Tablet by mouth as needed for Migraine. Max 1 dose in 24 hours. 8 Tablet 5    atogepant (Qulipta) 30 mg tab Take 30 mg by mouth daily. Indications: migraine prevention 30 mg 5    aspirin 81 mg chewable tablet Take 1 Tablet by mouth daily. 30 Tablet 5    oxybutynin (DITROPAN) 5 mg tablet Take 5 mg by mouth daily. DULoxetine (CYMBALTA) 20 mg capsule Take 20 mg by mouth daily. potassium chloride SA (MICRO-K) 10 mEq capsule Take 10 mEq by mouth daily. buPROPion SR (WELLBUTRIN SR) 150 mg SR tablet Take  by mouth daily. lactulose (CHRONULAC) 10 gram/15 mL solution Take  by mouth as needed. gabapentin (NEURONTIN) 600 mg tablet Take  by mouth daily.       hydroCHLOROthiazide (HYDRODIURIL) 25 mg tablet Take 25 mg by mouth daily.      magnesium oxide (MAG-OX) 400 mg tablet Take 1 Tablet by mouth daily. 30 Tablet 0    cyclobenzaprine (FLEXERIL) 10 mg tablet 10 mg three (3) times daily as needed. sulindac (CLINORIL) 200 mg tablet two (2) times a day. metoprolol succinate (TOPROL-XL) 100 mg tablet Take 100 mg by mouth every evening. Advair Diskus 250-50 mcg/dose diskus inhaler INHALE 1 PUFF BY MOUTH TWICE DAILY      omeprazole (PRILOSEC) 20 mg capsule TAKE 1 CAPSULE BY MOUTH ONCE DAILY 30 MINUTES BEFORE MORNING MEAL      albuterol (PROVENTIL HFA, VENTOLIN HFA) 90 mcg/actuation inhaler Take 2 Puffs by inhalation every six (6) hours as needed. fluticasone propionate (FLOVENT HFA) 44 mcg/actuation inhaler Take 1 Puff by inhalation two (2) times a day. Allergies   Allergen Reactions    Topiramate Nausea Only and Other (comments)     HEADACHE, LOSS OF SMELL, STRANGE TEST IN MOUTH    Hydrocodone Nausea and Vomiting    Meloxicam Itching    Nortriptyline Other (comments)     MOOD SWINGS    Penicillins Rash       Social History     Tobacco Use    Smoking status: Every Day     Packs/day: 0.50     Years: 42.00     Pack years: 21.00     Types: Cigarettes    Smokeless tobacco: Never    Tobacco comments:     no smoking within 24 hours of dos   Vaping Use    Vaping Use: Never used   Substance Use Topics    Alcohol use: Not Currently    Drug use: No       Family History   Problem Relation Age of Onset    Heart Disease Mother     Hypertension Mother     Stroke Mother     Hypertension Father     Hypertension Sister     Diabetes Sister        Review of Systems:  GENERAL: Denies fever. +fatigue  CARDIAC: No CP or SOB  PULMONARY: No cough or SOB  MUSCULOSKELETAL: No new joint pain  NEURO: SEE HPI    Physical Examination:  Visit Vitals  /70   Pulse (!) 106   Resp 18   Ht 5' 3.5\" (1.613 m)   Wt 70.8 kg (156 lb)   LMP  (LMP Unknown)   SpO2 99%   BMI 27.20 kg/m²       Alert, in NAD. Heart is regular. Oriented x3.  Speech is fluent. Speech clear. She scored 22 out of 30 on the MMSE. Registration 3 out of 3 objects. Recall at 1 minute 0 out of 3 objects. Did not spell the word 'world' backward correctly. Clock drawing normal.  EOMs are full, PERRL, VFFTC, no nystagmus. No facial asymmetry. Tongue is midline. Strength and tone are normal. No drift of the bilateral upper extremities. Fine finger movements symmetrical. FNF intact bilaterally. DTRs +2. Gait is normal.      Impression/Plan: This is a 69-year-old right-handed female who presents in follow-up for migraines. She had meningioma to the right posterior parafalcine region overlying the right parietal vertex, status postcraniotomy for resection in April 2022, then surgery in July due to reported infection, then cranioplasty in September. She was last seen here in October. She reported that the migraines continue. They can be very bothersome and can last more than a day. She did not think that Ajovy helped but for the initial 2 weeks after the injection. She tried it for at least 3 months. This was stopped around the time of the infection. She cannot tolerate Topamax. She had intolerable side effects on nortriptyline. Depakote caused fatigue in the past.  She restarted it but it did not help it. She would prefer not to increase it so we will stop it because it is not helping. She is currently on metoprolol and BP is controlled. She is on Cymbalta and gabapentin for her back. She is on bupropion for smoking cessation. Avelina Nava was prescribed for migraine preventative but was not able to acquire yet from the pharmacy. We will send a prescription and call the pharmacy. Will prescribe Nurtec for acute treatment. She has some headaches described as an explosion in her head. We will obtain MRI/A stat. Evaluate for cerebral vasoconstriction syndrome or other cause. She also reports memory problems with short-term memory.   She has risk factor including pain, prior strokes, surgery, and endorses depression and anxiety. Also possible sleep disorder. Endorses snoring and daytime fatigue. She has history of asthma. Will refer for sleep specialist evaluation to evaluate for potential sleep disorder. We will check thyroid function tests. She has history of thyroid surgery at age 5 or 4. We will also obtain sed rate and CRP. Will refer for neuropsychological evaluation. We will proceed with psychiatry referral as well; she is interested in this. Follow up around January. Consulted with Dr. Iqra Rodriguez after the visit. Will obtain the MRI/A brain to evaluate for secondary cause of the headaches. Diagnoses and all orders for this visit:    1. Dizzy spells  -     MRI BRAIN WO CONT; Future  -     MRA BRAIN WO CONT; Future    2. Intractable chronic migraine without aura and without status migrainosus  -     REFERRAL TO NEUROPSYCHOLOGY  -     rimegepant (NURTEC) 75 mg disintegrating tablet; Take 1 Tablet by mouth as needed for Migraine. Max 1 dose in 24 hours. -     SED RATE (ESR); Future  -     CRP, HIGH SENSITIVITY; Future  -     MRI BRAIN WO CONT; Future  -     MRA BRAIN WO CONT; Future    3. S/P craniotomy  -     REFERRAL TO NEUROPSYCHOLOGY  -     MRI BRAIN WO CONT; Future  -     MRA BRAIN WO CONT; Future    4. History of meningioma  -     REFERRAL TO NEUROPSYCHOLOGY  -     MRI BRAIN WO CONT; Future  -     MRA BRAIN WO CONT; Future    5. Cognitive complaints  -     REFERRAL TO NEUROPSYCHOLOGY  -     TSH AND FREE T4; Future  -     T3 TOTAL; Future    6. Snoring  -     SLEEP MEDICINE REFERRAL    7. Fatigue, unspecified type  -     SLEEP MEDICINE REFERRAL    8. History of depression    9. History of anxiety  -     REFERRAL TO PSYCHIATRY    10. Severe headache  -     MRI BRAIN WO CONT; Future  -     MRA BRAIN WO CONT; Future    Other orders  -     atogepant (Qulipta) 30 mg tab; Take 30 mg by mouth daily.  Indications: migraine prevention    Total time 40 minutes with 25 minutes spent in counseling. Signed By: Aris Escamilla NP        PLEASE NOTE:   Portions of this document may have been produced using voice recognition software. Unrecognized errors in transcription may be present.

## 2022-11-16 NOTE — PATIENT INSTRUCTIONS
Patient instructions:  Jolene Colby for migraine preventative (sending to [facility] in [address] [place] [address] for acute treatment of migraines  -sleep specialist evaluation (Dr. Umesh Siddiqi)  -psychiatry referral (KATHARINE Andrew)  -neuropsychiatry evaluation for memory testing (Dr. Paco Mathis)  -labwork  -follow up in January

## 2022-11-18 LAB
CRP SERPL HS-MCNC: 0.95 MG/L (ref 0–3)
ERYTHROCYTE [SEDIMENTATION RATE] IN BLOOD BY WESTERGREN METHOD: 6 MM/HR (ref 0–40)
T3 SERPL-MCNC: 125 NG/DL (ref 71–180)
T4 FREE SERPL-MCNC: 1.22 NG/DL (ref 0.82–1.77)
TSH SERPL DL<=0.005 MIU/L-ACNC: 0.74 UIU/ML (ref 0.45–4.5)

## 2022-11-23 ENCOUNTER — TELEPHONE (OUTPATIENT)
Dept: NEUROLOGY | Age: 57
End: 2022-11-23

## 2022-11-23 NOTE — TELEPHONE ENCOUNTER
Pt called to notify that she rec ref for neuropsychology but whenever she calls them she gets no answer or a return call, this has been going on a week.  Please advise with pt

## 2022-11-29 ENCOUNTER — TELEPHONE (OUTPATIENT)
Dept: NEUROLOGY | Age: 57
End: 2022-11-29

## 2022-11-30 ENCOUNTER — HOSPITAL ENCOUNTER (OUTPATIENT)
Dept: MRI IMAGING | Age: 57
Discharge: HOME | End: 2022-11-30
Attending: NURSE PRACTITIONER
Payer: MEDICAID

## 2022-11-30 PROCEDURE — 70551 MRI BRAIN STEM W/O DYE: CPT

## 2022-11-30 PROCEDURE — 70544 MR ANGIOGRAPHY HEAD W/O DYE: CPT

## 2022-12-08 ENCOUNTER — TELEPHONE (OUTPATIENT)
Dept: NEUROLOGY | Age: 57
End: 2022-12-08

## 2022-12-08 DIAGNOSIS — I67.9 INTRACRANIAL VASCULAR STENOSIS: Primary | ICD-10-CM

## 2022-12-08 NOTE — TELEPHONE ENCOUNTER
Discussed results of MRI/MRA with patient via phone. MRI brain no acute findings. 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 She saw psychiatry. Started on oxcarbazepine. 150 mg BID then in 4 weeks 300. Helps for her mood, not being so hyper. Not having the explosive HAs now, only 1 or 2 but not every day. Last one was last week. Did not get [place] [facility] or Nurtec. Will d/c these. Not taking decongestants. Not on stimulant. Does not want to see the neuropsychologist.   Will refer for neurovascular evaluation due to MRA findings.

## 2022-12-14 ENCOUNTER — TELEPHONE (OUTPATIENT)
Dept: NEUROLOGY | Age: 57
End: 2022-12-14

## 2022-12-14 NOTE — TELEPHONE ENCOUNTER
Pt was referred to Dr. Jayashree Ha.  Pt called to get  appt and was told they need office notes and imaging with referral before an appt can be scheduled

## 2022-12-19 ENCOUNTER — TELEPHONE (OUTPATIENT)
Dept: NEUROLOGY | Age: 57
End: 2022-12-19

## 2022-12-21 ENCOUNTER — OFFICE VISIT (OUTPATIENT)
Dept: PULMONOLOGY | Age: 57
End: 2022-12-21
Payer: MEDICAID

## 2022-12-21 VITALS
DIASTOLIC BLOOD PRESSURE: 74 MMHG | BODY MASS INDEX: 27.45 KG/M2 | RESPIRATION RATE: 19 BRPM | HEART RATE: 97 BPM | SYSTOLIC BLOOD PRESSURE: 125 MMHG | HEIGHT: 63 IN | OXYGEN SATURATION: 95 % | TEMPERATURE: 97.7 F | WEIGHT: 154.9 LBS

## 2022-12-21 DIAGNOSIS — I10 HYPERTENSION, BENIGN ESSENTIAL, GOAL BELOW 140/90: ICD-10-CM

## 2022-12-21 DIAGNOSIS — R40.0 DAYTIME SOMNOLENCE: ICD-10-CM

## 2022-12-21 DIAGNOSIS — R29.818 SUSPECTED SLEEP APNEA: Primary | ICD-10-CM

## 2022-12-21 DIAGNOSIS — F51.03 PARADOXICAL INSOMNIA: ICD-10-CM

## 2022-12-21 DIAGNOSIS — Z86.73 HISTORY OF CVA (CEREBROVASCULAR ACCIDENT): ICD-10-CM

## 2022-12-21 DIAGNOSIS — R06.83 SNORING: ICD-10-CM

## 2022-12-21 DIAGNOSIS — Z72.821 POOR SLEEP HYGIENE: ICD-10-CM

## 2022-12-21 PROCEDURE — 3074F SYST BP LT 130 MM HG: CPT | Performed by: OTOLARYNGOLOGY

## 2022-12-21 PROCEDURE — 99204 OFFICE O/P NEW MOD 45 MIN: CPT | Performed by: OTOLARYNGOLOGY

## 2022-12-21 PROCEDURE — 3078F DIAST BP <80 MM HG: CPT | Performed by: OTOLARYNGOLOGY

## 2022-12-21 NOTE — PROGRESS NOTES
[facility]  Pulmonary, Critical Care, and Sleep Medicine    Office Progress Note - Initial Evaluation      Primary Care Physician: Suhail Quijano MD     Reason for Visit: Evaluation for possible obstructive sleep apnea. Assessment:  1. Suspected sleep apnea  2. Snoring  3. Paradoxical insomnia  4. Poor sleep hygiene  5. Daytime somnolence  6. History of CVA (cerebrovascular accident)  7. Hypertension, benign essential, goal below 140/90     Discussion: Mrs. Delonte Pickard is a 74-year-old female with a longstanding history of loud snoring and witnessed apneas as well as several medical problems to include 3 strokes, high blood pressure, recent resection of a meningioma, depression, asthma and a 20 pound weight gain in the past year. Due to her significant medical problems she left her job in 2020. Her main complaint is inability to sleep more than 3 or 4 hours at a time and eating at night. She also naps during the day several days a week for for several hours. She does realize that at her age these are some fairly significant medical problems and she also realizes she was referred for evaluation of possible obstructive sleep apnea. After much discussion it is appropriate to order an in lab study possible split-night study which I discussed with the patient. We did discuss CPAP and the likelihood that that would be our treatment of choice. I will have her follow-up after her sleep study. We will also the need to address her sleep hygiene which we briefly discussed as well. We also discussed not driving while drowsy. She does not drive every day when she does she drives a motorcycle/scooter. Plan:  Schedule patient for an in lab sleep study for further evaluation. Potential consequences of untreated sleep apnea, and/or excessive daytime sleepiness were discussed with the patient. Educational materials provided.   Treatment options including CPAP, dental appliance, weight reduction measures, positional therapy, surgeries etc were at least briefly discussed. Healthy lifestyle changes to include weight loss and exercise discussed. Healthy sleep habits were reviewed and encouraged.  and workplace safety reviewed and discussed as appropriate. Drowsy and/or inattentive driving should be avoided. Follow up with Primary Care Provider (PCP) as directed and for routine health care maintenance. Follow-up: 1-2 months (after sleep study complete), sooner should new symptoms or problems arise. Thank you for allowing me to participate in the care of your patient! Gilbert Richards DO, FACS  Sleep and Medicine        History of Present Illness: Mr. David Pascual is a 61 y.o. female patient who was referred for evaluation of possible obstructive sleep apnea. The history was provided by the patient. Patient states that she goes to bed between 8 and 9:00 and it takes her about a half an hour to fall asleep. She does wake up 3 times during the night -1 of these times to use the restroom and at least 2 other times. She says she cannot sleep more than 3 to 4 hours in a row and will get up to get something to drink and and she does admit to eating in the middle of the night as well. This has been going on for the past couple of years. She developed a plethora of medical problems in the past several years which led to her inability to work any longer in 2020. She claims she is \"bored \"all day and that is the reason for napping after lunch for 1 to 2 hours. She does admit to dreaming during her naps but does not nap every day. After going to bed at 8 or 9:00 PM she will get up for the day between 0500 and 0530 and is not tired at that time typically. She is to get up 0 400 to work at the [employer] in years past.  She does sleep on her stomach with 2 pillows and does have the TV on in the bedroom and states that \"it stays on all night\".   She does like to watch TV during the day and several times during this visit said how bored she was at home. She does also admit to being aware of snoring, mouth breathing and waking up with a headache frequently. She is currently being seen by psychiatry, neurology and cardiology. Has a BERRY scheduled for January. 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 Motor vehicle accident(s) associated with drowsy driving have not occurred. Snoring:is a problem. This is a Chronic problem which has been ongoing for years and is described as  \"very loud\". Witnessed apneas are reported. Daytime Somnolence: is not a problem (see [place] score below) over the patient naps for 2 hours on a lot of days during the week. .  This is a Chronic problem which has been ongoing for years. Dental: Teeth clenching or grinding at night while sleeping is not reported. Naps: are reported. She takes approximately 3 naps weekly. Naps from 2 hours and does find nap(s) refreshing. Vivid dreams dooccur with naps. Leg Symptoms: She does not have unpleasant or crawling sensation in legs or strong urge to move when inactive. Pain: Pain typically does not disturb their sleep on most days. GERD: is not reported during the night. Mood: The patient describes their mood as: stable. Sleep-Wake History: See HPI     Reports sleeping in a Bed with 1-2 pillows under their head. Estimates sleeping approximately 4 hours per night/day. Usually awakens spontaneously and easily    Workdays and weekends are not substantially different in terms of sleep/wake times. Patient does have a bed partner currently. Vivid dreams are not reported. Waking up from sleep with a headache is reported. Awakening with a dry mouth is reported. Symptom(s) suggestive of cataplexy are not reported. Sleep paralysis is not reported.   Sleep onset/waking hallucinations: are not reported generally but says that rarely she thinks she hears someone calling her name. Sleep walking is not reported. Sleep talking is reported. Enuresis is not reported. Acting out dreams violently is not reported. \"Clock-watching\" at night is reported. Other unusual and/or parasomnia behaviors is not reported. Family Sleep History (if available/pertinent):    Stop Bang 12/21/2022 9/30/2022 9/14/2022 4/8/2022 9/15/2021 8/26/2021   Does the patient snore loudly (louder than talking or loud enough to be heard through closed doors)? 1 0 0 0 0 0   Does the patient often feel tired, fatigued, or sleepy during the daytime, even after a \"good\" night's sleep? 1 0 0 1 0 0   Has anyone ever observed the patient stop breathing during their sleep?  0 0 0 1 0 0   Does the patient have or are they being treated for high blood pressure? 1 1 1 1 0 0   Is the patient's BMI greater than 35? 0 0 0 0 0 0   Is your neck circumference greater than 17 inches (Male) or 16 inches (Female)? 0 0 0 0 0 -   Is the patient older than 50? 1 1 1 1 1 1   Is the patient male? 0 0 0 0 0 0   KRIS Score 4 2 2 4 1 -       3 most recent PHQ Screens 12/21/2022   Little interest or pleasure in doing things Not at all   Feeling down, depressed, irritable, or hopeless Not at all   Total Score PHQ 2 0        [place] Sleepiness Score: 12   which reflects mild daytime drowsiness.        Immunization History:  Immunization History   Administered Date(s) Administered    COVID-19, PFIZER PURPLE top, DILUTE for use, (age 15 y+), IM, 30mcg/0.3mL 03/23/2021       Past Medical History:  Past Medical History:   Diagnosis Date    Appetite loss 2013    Arthritis     in back    Asthma 2006    Back pain 08/31/2012    Chest pain     Degenerative disc disease, lumbar     DUB (dysfunctional uterine bleeding)     Dysphagia     FROM RECENT POSSIBLE STROKE    Foot pain, left     GERD (gastroesophageal reflux disease)     Headache(784.0) migraine variant    Hypertension     Irregular heartbeat     Kidney calculus     Kidney stone     Meningioma [facility])     Menorrhagia     Pelvic pain in female     Postlaminectomy syndrome     S/P ankle ligament repair, left 02/07/2014    Stroke ([address] 1988, 1996, 1998    x3 - no residual  PT. THINKS SHE MAY HAVE HAD ONE 2 WEEKS AGO    Tobacco abuse     Trichomonas     Urinary urgency     UTI (lower urinary tract infection)     Wears glasses 2007    Wound infection     PARIETAL WOUND       Past Surgical History:  Past Surgical History:   Procedure Laterality Date    HX CRANIOTOMY  04/2022    HX LUMBAR FUSION  2012    L4-L5    HX LUMBAR FUSION  09/15/2021    HX ORTHOPAEDIC Left 02/07/2014    ligament reconstruction  ANKLE    HX OTHER SURGICAL  07/2022    I&D PARIETAL WOUND    HX TONSILLECTOMY      HX TUBAL LIGATION  1996    x2       Family History:  Family History   Problem Relation Age of Onset    Heart Disease Mother     Hypertension Mother     Stroke Mother     Hypertension Father     Hypertension Sister     Diabetes Sister        Social History:  Social History     Tobacco Use    Smoking status: Every Day     Packs/day: 0.50     Years: 42.00     Pack years: 21.00     Types: Cigarettes    Smokeless tobacco: Never    Tobacco comments:     no smoking within 24 hours of [employer] Use    Vaping Use: Never used   Substance Use Topics    Alcohol use: Not Currently    Drug use: No        Caffeine Amount Time of last Intake Comments   Coffee 1 cup per day     Soda 1 cup per day     Tea 0     Energy Drinks 0     Over- the - counter stimulant pills 0     Other Substances      Alcohol 0     Tobacco 1 ppd     Drugs 0     Other: 0         Medications:  Current Outpatient Medications on File Prior to Visit   Medication Sig Dispense Refill    aspirin 81 mg chewable tablet Take 1 Tablet by mouth daily. 30 Tablet 5    DULoxetine (CYMBALTA) 20 mg capsule Take 20 mg by mouth daily.       potassium chloride SA (MICRO-K) 10 mEq capsule Take 10 mEq by mouth daily. buPROPion SR (WELLBUTRIN SR) 150 mg SR tablet Take  by mouth daily. lactulose (CHRONULAC) 10 gram/15 mL solution Take  by mouth as needed. hydroCHLOROthiazide (HYDRODIURIL) 25 mg tablet Take 25 mg by mouth daily. magnesium oxide (MAG-OX) 400 mg tablet Take 1 Tablet by mouth daily. 30 Tablet 0    cyclobenzaprine (FLEXERIL) 10 mg tablet 10 mg three (3) times daily as needed. sulindac (CLINORIL) 200 mg tablet two (2) times a day. metoprolol succinate (TOPROL-XL) 100 mg tablet Take 100 mg by mouth every evening. Advair Diskus 250-50 mcg/dose diskus inhaler INHALE 1 PUFF BY MOUTH TWICE DAILY      omeprazole (PRILOSEC) 20 mg capsule TAKE 1 CAPSULE BY MOUTH ONCE DAILY 30 MINUTES BEFORE MORNING MEAL      albuterol (PROVENTIL HFA, VENTOLIN HFA) 90 mcg/actuation inhaler Take 2 Puffs by inhalation every six (6) hours as needed. fluticasone propionate (FLOVENT HFA) 44 mcg/actuation inhaler Take 1 Puff by inhalation two (2) times a day. oxybutynin (DITROPAN) 5 mg tablet Take 5 mg by mouth daily. (Patient not taking: Reported on 12/21/2022)      gabapentin (NEURONTIN) 600 mg tablet Take  by mouth daily. (Patient not taking: Reported on 12/21/2022)       No current facility-administered medications on file prior to visit. Allergy:  Allergies   Allergen Reactions    Topiramate Nausea Only and Other (comments)     HEADACHE, LOSS OF SMELL, STRANGE TEST IN MOUTH    Hydrocodone Nausea and Vomiting    Meloxicam Itching    Nortriptyline Other (comments)     MOOD SWINGS    Penicillins Rash       Review of Systems:  Otherwise negative and per HPI (see HPI summary)  General: Negative for fatigue, malaise, chills, fever, hot flashes and night sweats. Eyes: No dry eyes, eye irritation, eye disease/injury.   ENT: Negative for epistaxis, nasal congestion, nasal discharge, nasal polys, oral lesions sinus pain/infections, tinnitus, vertigo, hearing loss.  Hematological and Lymphatic: negative for bleeding problems, blood clots, bruising, jaundice, swollen lymph nodes, anemia  Endocrine: negative for polydipsia/polyuria, skin changes, temperature intolerance or unexpected weight changes  Respiratory: Negative for cough, sneezing, shortness of breath or wheezing - h/o asthma  Cardiovascular: Negative for chest pain, dyspnea on exertion, no palpitations,no known heart murmur  Gastrointestinal: No abdominal pain, no recent change in bowel habits  Genito-Urinary: No dysuria, trouble voiding, or hematuria  Musculoskeletal: Negative for joint pain, limb weakness, no arthralgias, no swelling in extremities, no neck pain, no osteoporosis  Neurological: No TIA or stroke symptoms, no weakness, no numbness, no seizures, no dizziness, no tremors, no gait dysfunction, no paralysis, no headaches - h/o migraines, meningioma resection  Dermatological: Negative for - pruritus, rash or skin lesion changes   Psychological: Negative for mood lability - h/o depression        Physical Exam:  Blood pressure 125/74, pulse 97, temperature 97.7 °F (36.5 °C), temperature source Temporal, resp. rate 19, height 5' 3\" (1.6 m), weight 70.3 kg (154 lb 14.4 oz), SpO2 95 %. on room air, Body mass index is 27.44 kg/m². General: No distress, acyanotic, appears stated age, cooperative, pleasant  HEENT: PERRL, EOMI, OC/OP: Tongue without lesions, Mallampati score 2, Uvula midline,Tonsils 1, FTP Iib, class I occlusion  Neck: Supple, no lymphadenopathy, thyroid is not enlarged/no nodules noted   Lungs: Clear to auscultation bilaterally  Heart: Regular rate and rhythm, S1/S2 present, without murmur. Extremity: Negative for cyanosis, edema, or clubbing. Skin: Skin color, texture, turgor normal. No rashes or lesions.   Neurological: CN 2-12 grossly intact, normal muscle tone, no focal deficits    Data Reviewed:  CBC:   Lab Results   Component Value Date/Time    WBC 10.1 10/07/2022 07:20 PM    HGB 11.9 (L) 10/07/2022 07:20 PM    HCT 35.4 (L) 10/07/2022 07:20 PM    PLATELET 859 90/07/7348 07:20 PM    MCV 84.3 10/07/2022 07:20 PM       BMP:   Lab Results   Component Value Date/Time    Sodium 143 10/07/2022 07:20 PM    Potassium 3.9 10/07/2022 07:20 PM    Chloride 111 (H) 10/07/2022 07:20 PM    CO2 24 10/07/2022 07:20 PM    Anion gap 8 10/07/2022 07:20 PM    Glucose 129 (H) 10/07/2022 07:20 PM    BUN 12 10/07/2022 07:20 PM    Creatinine 0.84 10/07/2022 07:20 PM    BUN/Creatinine ratio 24 (H) 02/27/2022 02:00 PM    GFR est AA >60.0 10/07/2022 07:20 PM    GFR est non-AA >60 10/07/2022 07:20 PM    Calcium 9.5 10/07/2022 07:20 PM        TSH:  Lab Results   Component Value Date/Time    TSH 0.739 11/17/2022 11:01 AM    TSH 0.074 (L) 04/28/2022 06:23 AM    TSH 1.00 08/12/2014 10:18 AM    TSH 0.67 09/05/2013 12:40 PM    TSH 0.63 03/07/2012 08:12 AM       Lucia Hilario DO, FACS  Sleep and Medicine

## 2022-12-21 NOTE — PROGRESS NOTES
Fermin Alex presents today for   Chief Complaint   Patient presents with    Sleep Problem    Snoring    Shortness of Breath    New Patient       Is someone accompanying this pt? no    Is the patient using any DME equipment during OV? no    -DME Company     Have you ever had a sleep study done before? No    Depression Screening:  3 most recent PHQ Screens 12/21/2022   Little interest or pleasure in doing things Not at all   Feeling down, depressed, irritable, or hopeless Not at all   Total Score PHQ 2 0       [place] Sleepiness Scale:  [place] Sleepiness Scale  12/21/2022   Sitting and Reading 3   Watching TV 3   Sitting, inactive in a public place (e.g. a movie theater or meeting) 2   As a passenger in a car for an hour, without a break 0   Lying down to rest in the afternoon, when circumstances permit 3   Sitting and talking to someone 0   Sitting quietly after lunch without alcohol 1   In a car, while stopped for a few minutes in traffic 0   [place] Sleepiness Score 12       Stop-Bang:  Stop Lucrezia Coby 12/21/2022   Does the patient snore loudly (louder than talking or loud enough to be heard through closed doors)? 1   Does the patient often feel tired, fatigued, or sleepy during the daytime, even after a \"good\" night's sleep? 1   Has anyone ever observed the patient stop breathing during their sleep?  0   Does the patient have or are they being treated for high blood pressure? 1   Is the patient's BMI greater than 35? 0   Is your neck circumference greater than 17 inches (Male) or 16 inches (Female)? 0   Is the patient older than 50? 1   Is the patient male? 0   KRIS Score 4       Neck Circumference: 13 inches         Coordination of Care:  1. Have you been to the ER, urgent care clinic since your last visit? Hospitalized since your last visit? No    2. Have you seen or consulted any other health care providers outside of the [address] [place] [address] since your last visit?  Include any pap smears or colon screening. no    Medication list has been updated according to patient.

## 2022-12-28 ENCOUNTER — TELEPHONE (OUTPATIENT)
Dept: NEUROLOGY | Age: 57
End: 2022-12-28

## 2022-12-28 NOTE — TELEPHONE ENCOUNTER
Patient called and stated her medication was not approved but she does not know which medication it is

## 2022-12-29 ENCOUNTER — TELEPHONE (OUTPATIENT)
Dept: NEUROLOGY | Age: 57
End: 2022-12-29

## 2023-01-23 ENCOUNTER — OFFICE VISIT (OUTPATIENT)
Dept: ORTHOPEDIC SURGERY | Age: 58
End: 2023-01-23
Payer: MEDICAID

## 2023-01-23 DIAGNOSIS — G89.29 CHRONIC PAIN OF RIGHT ANKLE: ICD-10-CM

## 2023-01-23 DIAGNOSIS — M76.821 POSTERIOR TIBIAL TENDINITIS OF RIGHT LOWER EXTREMITY: Primary | ICD-10-CM

## 2023-01-23 DIAGNOSIS — M25.571 CHRONIC PAIN OF RIGHT ANKLE: ICD-10-CM

## 2023-01-23 PROCEDURE — 73610 X-RAY EXAM OF ANKLE: CPT | Performed by: ORTHOPAEDIC SURGERY

## 2023-01-23 PROCEDURE — 99213 OFFICE O/P EST LOW 20 MIN: CPT | Performed by: ORTHOPAEDIC SURGERY

## 2023-01-23 NOTE — PROGRESS NOTES
AMBULATORY PROGRESS NOTE      Patient: Marge Tovar             MRN: [id number]     SSN: [id number] There is no height or weight on file to calculate BMI. YOB: 1965     AGE: 61 y.o. EX: female    PCP: Hamida Stewart MD       IMPRESSION //  DIAGNOSIS AND TREATMENT PLAN      Marge Tovar has a diagnosis of:      DIAGNOSES    1. Posterior tibial tendinitis of right lower extremity    2. Chronic pain of right ankle        Orders Placed This Encounter    Generic Supply Order     DME: A right AS/AC brace was given and placed on the patient today. AMB POC XRAY, ANKLE; COMPLETE, 3+ VIE     Order Specific Question:   Reason for Exam     Answer:   PAIN          PLAN:    1. I obtained 3V XR in the office today. 2. I advised the patient to continue the use of her trilam medially posted spenco  3. DME: A right AS/AC brace was given and placed on the patient today. If she is not improved, recommend to me for MRI of her right ankle assess right posterior tibial tendon. RTO 4 weeks    There are no Patient Instructions on file for this visit. Please follow up with your PCP for any health maintenance as recommended. Marge Tovar  expresses understanding of the diagnosis, treatment plan, and all of their proposed questions were answered to their satisfaction. Patient education has been provided re the diagnoses. HPI //  Truong Strong IS A 61 y.o. female who is a/an  established patient, presenting to my outpatient office for evaluation of  the following chief complaint(s):     Chief Complaint   Patient presents with    Ankle Pain     right       At LOV, Marge Tovar presented with left ankle pain. I obtained 3V XR in the office today. I provided a Thera-band for HEP to strengthen left ankle. I ordered an MRI of the left ankle to assess anterior ligaments and anterolateral talar dome.  I provided a work not to keep her out of work for the next 2 weeks, until her MRI results were assessed. Since Barb Mcgill presents today with right ankle pain. She reports that her ankle has been going numb intermittently. 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 She states that she will know the exact date for her surgery on Wednesday. There were no vitals taken for this visit. Appearance: Alert, well appearing and pleasant patient who is in no distress, oriented to person, place/time, and who follows commands. Normal dress/motor activity/thought processes/memory. This patient is accompanied in the examination room by her  self. Patient arrives to office via: without assistive device. Footwear: sketchers    Psychiatric:  Normal Affect/mood. Judgement, behavior, and conduct are appropriate. Speech normal in context and clarity, memory intact grossly, no involuntary movements - tremors. H EENT (2): Head normocephalic & atraumatic. Eye: pupils are round// EOM are intact // Neck: ROM WNL  // Hearings Intact   Respiratory: Breathing non labored     ANKLE/FOOT right    Gait: uses assistive device -trilam medial spenco  Tenderness: mild tenderness at the posterior tibial tendon, sinus tarsi,   Cutaneous: WNL. Joint Motion:   WNL   Joint / Tendon Stability:  No Ankle or Subtalar instability or joint laxity. No peroneal sublux ability or dislocation  Alignment: neutral Hindfoot,    Neuro Motor/Sensory: NL/NL  Vascular: NL foot/ankle pulses,   Lymphatics: No extremity lymphedema, No calf swelling, no tenderness to calf muscles. CHART REVIEW     Jan Hadley has been experiencing pain and discomfort confirmed as outlined in the pain assessment outlined below.      was reviewed by Deepak Young MD  on 1/23/2023. Pain Assessment  1/23/2023   Location of Pain Ankle   Location Modifiers Right   Severity of Pain 6   Quality of Pain Aching   Quality of Pain Comment -   Duration of Pain -   Frequency of Pain Intermittent   Date Pain First Started -   Aggravating Factors Walking;Standing;Squatting   Aggravating Factors Comment -   Limiting Behavior Yes   Relieving Factors Rest   Relieving Factors Comment -   Result of Injury -   Work-Related Injury -   Type of Injury -   Type of Injury Comment -        Marge Tovar  has a past medical history of Appetite loss (2013), Arthritis, Asthma (2006), Back pain (08/31/2012), Chest pain, Degenerative disc disease, lumbar, DUB (dysfunctional uterine bleeding), Dysphagia, Foot pain, left, GERD (gastroesophageal reflux disease), Headache(784.0), Hypertension, Irregular heartbeat, Kidney calculus, Kidney stone, Meningioma ([address] Menorrhagia, Pelvic pain in female, Postlaminectomy syndrome, S/P ankle ligament repair, left (02/07/2014), Stroke ([address] (1988, 1996, 1998), Tobacco abuse, Trichomonas, Urinary urgency, UTI (lower urinary tract infection), Wears glasses (2007), and Wound infection. She has no past medical history of Malignant hyperthermia due to anesthesia or Nausea & vomiting.      Patients is employed at:          has a past medical history of Appetite loss (2013), Arthritis, Asthma (2006), Back pain (08/31/2012), Chest pain, Degenerative disc disease, lumbar, DUB (dysfunctional uterine bleeding), Dysphagia, Foot pain, left, GERD (gastroesophageal reflux disease), Headache(784.0), Hypertension, Irregular heartbeat, Kidney calculus, Kidney stone, Meningioma ([address] Menorrhagia, Pelvic pain in female, Postlaminectomy syndrome, S/P ankle ligament repair, left (02/07/2014), Stroke ([address] (1988, 1996, 1998), Tobacco abuse, Trichomonas, Urinary urgency, UTI (lower urinary tract infection), Wears glasses (2007), and Wound infection. She has no past medical history of Malignant hyperthermia due to anesthesia or Nausea & vomiting. has a past surgical history that includes hx tubal ligation (1996); hx orthopaedic (Left, 02/07/2014); hx tonsillectomy; hx lumbar fusion (2012); hx lumbar fusion (09/15/2021); hx craniotomy (04/2022); and hx other surgical (07/2022). family history includes Diabetes in her sister; Heart Disease in her mother; Hypertension in her father, mother, and sister; Stroke in her mother. Current Outpatient Medications   Medication Sig    aspirin 81 mg chewable tablet Take 1 Tablet by mouth daily. potassium chloride SA (MICRO-K) 10 mEq capsule Take 10 mEq by mouth daily. buPROPion SR (WELLBUTRIN SR) 150 mg SR tablet Take  by mouth daily. lactulose (CHRONULAC) 10 gram/15 mL solution Take  by mouth as needed. hydroCHLOROthiazide (HYDRODIURIL) 25 mg tablet Take 25 mg by mouth daily. magnesium oxide (MAG-OX) 400 mg tablet Take 1 Tablet by mouth daily. cyclobenzaprine (FLEXERIL) 10 mg tablet 10 mg three (3) times daily as needed. sulindac (CLINORIL) 200 mg tablet two (2) times a day. metoprolol succinate (TOPROL-XL) 100 mg tablet Take 100 mg by mouth every evening. Advair Diskus 250-50 mcg/dose diskus inhaler INHALE 1 PUFF BY MOUTH TWICE DAILY    omeprazole (PRILOSEC) 20 mg capsule TAKE 1 CAPSULE BY MOUTH ONCE DAILY 30 MINUTES BEFORE MORNING MEAL    albuterol (PROVENTIL HFA, VENTOLIN HFA) 90 mcg/actuation inhaler Take 2 Puffs by inhalation every six (6) hours as needed. fluticasone propionate (FLOVENT HFA) 44 mcg/actuation inhaler Take 1 Puff by inhalation two (2) times a day. oxybutynin (DITROPAN) 5 mg tablet Take 5 mg by mouth daily. (Patient not taking: No sig reported)    DULoxetine (CYMBALTA) 20 mg capsule Take 20 mg by mouth daily. gabapentin (NEURONTIN) 600 mg tablet Take  by mouth daily.  (Patient not taking: No sig reported)     No current facility-administered medications for this visit. Allergies   Allergen Reactions    Topiramate Nausea Only and Other (comments)     HEADACHE, LOSS OF SMELL, STRANGE TEST IN MOUTH    Hydrocodone Nausea and Vomiting    Meloxicam Itching    Nortriptyline Other (comments)     MOOD SWINGS    Penicillins Rash     Social History     Occupational History    Not on file   Tobacco Use    Smoking status: Every Day     Packs/day: 0.50     Years: 42.00     Pack years: 21.00     Types: Cigarettes    Smokeless tobacco: Never    Tobacco comments:     no smoking within 24 hours of dos   Vaping Use    Vaping Use: Never used   Substance and Sexual Activity    Alcohol use: Not Currently    Drug use: No    Sexual activity: Yes     Partners: Male     Birth control/protection: Surgical       reports that she has been smoking cigarettes. She has a 21.00 pack-year smoking history. She has never used smokeless tobacco. She reports that she does not currently use alcohol. She reports that she does not use drugs. DIAGNOSTIC LAB DATA      Lab Results   Component Value Date/Time    Hemoglobin A1c 5.3 04/28/2022 06:23 AM    //   Lab Results   Component Value Date/Time    Glucose 129 (H) 10/07/2022 07:20 PM    Glucose (POC) 155 (H) 09/30/2022 02:49 PM        No results found for: NZF8OBGL, JRO6MIHI      No results found for: VITD3, XQVID2, XQVID3, XQVID, VD3RIA, [id number]     Drug Screen Most Recent Result Date    No resulted procedures found. REVIEW OF SYSTEMS : 1/23/2023  ALL BELOW ARE Negative except : SEE HPI     All other systems reviewed and are negative. 12 point review of systems otherwise negative unless noted in HPI.     RADIOGRAPHS// IMAGING//DIAGNOSTIC DATA     Orders Placed This Encounter    Generic Supply Order    AMB POC XRAY, ANKLE; COMPLETE, 3+ VIE      ANKLE X RAYS 3 VIEWS Right   X RAYS AT [facility]  1/23/2023    NON WEIGHT BEARING  AP/LAT/Oblique    X RAYS AT [address] [place] [address]  1/23/2023    Bones: No fractures or dislocations. No focal osteolytic or osteoblastic process   Bone Spurs: No significant bone spurs  Alignment: Ankle mortise alignment is congruent, Tibial plafond and talar dome intact. No Osteochondral defects seen   Joint: No Significant OA changes present  Soft Tissues: Normal, No radiopaque foreign body            No abnormal calcific densities to soft tissues            No ankle joint effusion in lateral projection. Mineralization: Suggests no Osteopenia    I have personally reviewed the results of the above study. The interpretation of this study is my professional opinion               I have spent 20 minutes reviewing the previous notes, reviewing diagnostic studies [Advanced  Imaging, Diagnostic test results (x-rays)] and had a direct face to face with the patient discussing the diagnosis and importance of compliance with the treatment and plan. The treatment plan is listed in the above plan section of this Office encounter. I  answered all of her questions, as well as documenting patient care coordination for this individual on the day of the visit. Disclaimer:     Sections of this note are dictated using utilizing voice recognition software, which may have resulted in some phonetic based errors in grammar and contents. Even though attempts were made to correct all the mistakes, some may have been missed, and remained in the body of the document. If questions arise, please contact our department. An electronic signature was used to authenticate this note. Carol Mcnally may have a reminder for a \"due or due soon\" health maintenance. I have asked that she contact her primary care provider for follow-up on this health maintenance.            Scribed by Franci Huber; as dictated by Collin Sapp MD   1/23/2023  7:29 AM

## 2023-02-10 ENCOUNTER — TELEPHONE (OUTPATIENT)
Age: 58
End: 2023-02-10

## 2023-02-14 DIAGNOSIS — I67.9 INTRACRANIAL VASCULAR STENOSIS: Primary | ICD-10-CM

## 2023-02-24 ENCOUNTER — TRANSCRIBE ORDERS (OUTPATIENT)
Dept: SLEEP MEDICINE | Facility: HOSPITAL | Age: 58
End: 2023-02-24

## 2023-02-24 DIAGNOSIS — R29.818 SUSPECTED SLEEP APNEA: Primary | ICD-10-CM

## 2023-03-01 ENCOUNTER — OFFICE VISIT (OUTPATIENT)
Age: 58
End: 2023-03-01

## 2023-03-01 DIAGNOSIS — M76.821 POSTERIOR TIBIAL TENDINITIS, RIGHT LEG: Primary | ICD-10-CM

## 2023-03-01 NOTE — PROGRESS NOTES
AMBULATORY PROGRESS NOTE      Patient: Rosy Arteaga             MRN: [id number]     SSN: [id number] There is no height or weight on file to calculate BMI. YOB: 1965     AGE: 60 y.o. EX: female    PCP: Tonya Mercado MD       IMPRESSION //  DIAGNOSIS AND TREATMENT PLAN      Rosy Arteaga has a diagnosis of: It is my professional opinion that Rosy Arteaga has an Orthopaedic Diagnosis of      ICD-10-CM    1. Posterior tibial tendinitis, right leg  M76.821 MRI ANKLE RIGHT WO CONTRAST      . Rosy Arteaga has : Failed Conservative Treatment : Non Narcotic Analgesic Medications,  , Orthotic/Brace Treatment, Formal Supervised Physical Therapy,  and Rosy Arteaga is now in need of right ankle MRI , Advanced Imaging  * in order to assess achilles/tib post/retrocal burase regions, and  AS THIS STUDY HAS PROGNOSTIC AND TREATMENT DECISION IMPLICATIONS: new orthotic vs surgery. DIAGNOSES    1. Posterior tibial tendinitis, right leg            PLAN:    1. I ordered an MRI of the right ankle to assess for further injury  2. RTO after MRI. Orders Placed This Encounter    MRI ANKLE RIGHT WO CONTRAST     Standing Status:   Future     Standing Expiration Date:   4/1/2023     Order Specific Question:   Reason for exam:     Answer:   to assess for ankle tendinitis. Order Specific Question:   What is the sedation requirement? Answer:   None        There are no Patient Instructions on file for this visit. Please follow up with your PCP for any health maintenance as recommended. Rosy Arteaga  expresses understanding of the diagnosis, treatment plan, and all of their proposed questions were answered to their satisfaction. Patient education has been provided re the diagnoses.          HPI // [address] Emiliano eGrman IS A 60 y.o. female who is a/an  established patient, presenting to my outpatient office for evaluation of  the following chief complaint(s):     Chief Complaint   Patient presents with    Ankle Pain     right       At LOV, Malik Dia presented with posterior tibial tendinitis of right lower extremity and chronic pain of right ankle. I obtained 3V XR in the office. I advised patient to continue the use of her medially posted trilam. DME: AS/AC brace was given and placed on the patient. I anticipate ordering an MRI if her pain is not improved. Since LOV, Malik Dia presents today with posterior tibial tendinitis of right lower extremity. She reports that her leg gives out at times. She reports that she still experiences pain and discomfort daily. There were no vitals taken for this visit. Appearance: Alert, well appearing and pleasant patient who is in no distress, oriented to person, place/time, and who follows commands. Normal dress/motor activity/thought processes/memory. This patient is accompanied in the examination room by her self. Patient arrives to office via: with assistive device: AS/AC brace. Footwear: irma sneakers    Psychiatric:  Normal Affect/mood. Judgement, behavior, and conduct are appropriate. Speech normal in context and clarity, memory intact grossly, no involuntary movements - tremors. H EENT (2): Head normocephalic & atraumatic. Eye: pupils are round// EOM are intact // Neck: ROM WNL  // Hearings Intact  Respiratory: Breathing non labored     ANKLE/FOOT Right    Gait:  uses assistive device -AS/AC brace  Tenderness: mild  to moderate tenderness to the calcaneal bursa, distal Achilles,   Cutaneous:   WNL. Joint Motion:  WNL   Joint / Tendon Stability:  No Ankle or Subtalar instability or joint laxity.                        No peroneal sublux ability or dislocation  Alignment: neutral Hindfoot,    Neuro Motor/Sensory: NL/NL  Vascular: NL foot/ankle pulses,   Lymphatics: No extremity lymphedema, No calf swelling, no tenderness to calf muscles. RADIOGRAPHS// IMAGING//DIAGNOSTIC DATA     Orders Placed This Encounter   Procedures    MRI ANKLE RIGHT WO CONTRAST     Standing Status:   Future     Standing Expiration Date:   4/1/2023     Order Specific Question:   Reason for exam:     Answer:   to assess for ankle tendinitis. Order Specific Question:   What is the sedation requirement? Answer:   None               CHART REVIEW     Rosy Arteaga has been experiencing pain and discomfort confirmed as outlined in the pain assessment outlined below.  was reviewed by Sharyle Latus. Tiffany Altman MD  on 3/1/2023. No flowsheet data found. PAST MEDICAL HISTORY:   Past Medical History:   Diagnosis Date    Appetite loss 2013    Arthritis     in back    Asthma 2006    Back pain 08/31/2012    Chest pain     Degenerative disc disease, lumbar     DUB (dysfunctional uterine bleeding)     Dysphagia     FROM RECENT POSSIBLE STROKE    Foot pain, left     GERD (gastroesophageal reflux disease)     Headache(784.0)     migraine variant    Hypertension     Irregular heartbeat     Kidney calculus     Kidney stone     Meningioma (HCC)     Menorrhagia     Pelvic pain in female     Postlaminectomy syndrome     S/P ankle ligament repair 02/07/2014    Stroke ([facility] 1988, 1996, 1998    x3 - no residual  PT.  THINKS SHE MAY HAVE HAD ONE 2 WEEKS AGO    Tobacco abuse     Trichomonas     Urinary urgency     UTI (lower urinary tract infection)     Wears glasses 2007    Wound infection     PARIETAL WOUND     PAST SURGICAL HISTORY:   Past Surgical History:   Procedure Laterality Date    CRANIOTOMY  04/2022    LUMBAR FUSION  09/15/2021    LUMBAR FUSION  2012    L4-L5    ORTHOPEDIC SURGERY Left 02/07/2014    ligament reconstruction  ANKLE    OTHER SURGICAL HISTORY  07/2022    I&D PARIETAL WOUND    TONSILLECTOMY      TUBAL LIGATION  1996    x2     ALLERGIES: Not on File   FAMILY HISTORY:   Family History   Problem Relation Age of Onset    Hypertension Father     Diabetes Sister     Hypertension Sister     Stroke Mother     Hypertension Mother     Heart Disease Mother      SOCIAL HISTORY:   Social History     Socioeconomic History    Marital status: Single     Spouse name: None    Number of children: None    Years of education: None    Highest education level: None   Tobacco Use    Smoking status: Every Day     Packs/day: 0.50     Types: Cigarettes    Smokeless tobacco: Never   Substance and Sexual Activity    Alcohol use: Not Currently    Drug use: No       CURRENT MEDICATIONS:  A list of medications prior to the time of admission include:  Prior to Admission medications    Not on File       No current outpatient medications on file. No current facility-administered medications for this visit. DIAGNOSTIC LAB DATA      XR CHEST LIMITED ONE VIEW 10/07/2022    Narrative  EXAM: XR CHEST SNGL V  INDICATION: cp  COMPARISON: 4/8/2022  WORKSTATION ID: [id number]  TECHNIQUE: Frontal view. FINDINGS:    SUPPORT DEVICES: None. LUNGS/PLEURA: No consolidation or pleural effusion. HEART/MEDIASTINUM: Cardiomediastinal silhouette is normal.    OTHER: Partially imaged lumbar fusion. Impression  IMPRESSION: No acute cardiopulmonary process. Electronically signed by: Iván Cisneros MD 10/8/2022 12:01 AM EDT     No results found for this or any previous visit (from the past 4464 hour(s)).       Lab Results   Component Value Date    WBC 10.1 10/07/2022    HGB 11.9 (L) 10/07/2022    HCT 35.4 (L) 10/07/2022    MCV 84.3 10/07/2022     10/07/2022    LYMPHOPCT 24.6 (L) 10/07/2022    RBC 4.20 10/07/2022    MCH 28.3 10/07/2022    MCHC 33.6 10/07/2022    RDW 14.2 02/27/2022     Hemoglobin A1C   Date Value Ref Range Status   04/28/2022 5.3 4.0 - 6.0 % Final     Comment:       DIABETIC = GREATER THAN OR EQUALS TO 6.5%    PREDIABETIC = 5.7 - 6.4%                      IF A1C % IS:               YOUR ESTIMATED AVERAGE GLUCOSE IS:    4.0                        68 mg/dL    4.5 82 mg/dL    5.0                        97 mg/dL    5.5                        111 mg/dL    6                          126 mg/dL    6.5                        140 mg/dL    7                          154 mg/dL    7.5                        169 mg/dL    8                          183 mg/dL    8.5                        197 mg/dL    9                          212 mg/dL    9.5                        226 mg/dL    10                         240 mg/dL    10.5                       255 mg/dL    11                         269 mg/dL    11.5                       283 mg/dL    12                         298 mg/dL     ,  Lab Results   Component Value Date     10/07/2022    K 3.9 10/07/2022     (H) 10/07/2022    CO2 24 10/07/2022    BUN 12 10/07/2022    CREATININE 0.84 10/07/2022    GLUCOSE 129 (H) 10/07/2022    CALCIUM 9.5 10/07/2022    PROT 6.7 07/17/2022    LABALBU 3.1 (L) 07/25/2022    BILITOT 1.10 07/17/2022    ALKPHOS 116 07/17/2022    AST 24.0 07/17/2022    ALT 13 07/17/2022    LABGLOM >60 02/11/2022    GFRAA >60.0 10/07/2022    AGRATIO 1.2 02/27/2022    GLOB 3.5 02/27/2022     No results found for: VITD25   Lab Results   Component Value Date    INR 1.0 09/14/2022    INR 1.0 07/22/2022    INR 2.2 (H) 07/21/2022    PROTIME 11.7 09/14/2022    PROTIME 12.1 07/22/2022    PROTIME 26.7 (H) 07/21/2022     Lab Results   Component Value Date    APTT 27.9 09/14/2022          REVIEW OF SYSTEMS : 3/1/2023  ALL BELOW ARE Negative except : SEE HPI     All other systems reviewed and are negative. 14 point review of systems otherwise negative unless noted in HPI. I have spent 30 minutes reviewing the previous notes, reviewing diagnostic studies [Advanced  Imaging, Diagnostic test results (x-rays)] and had a direct face to face with the patient discussing the diagnosis and importance of compliance with the treatment and plan.   The treatment plan is listed in the above plan section of this Office encounter. I  answered all of her questions, as well as documenting patient care coordination for this individual on the day of the visit. Disclaimer:     Sections of this note are dictated using utilizing voice recognition software, which may have resulted in some phonetic based errors in grammar and contents. Even though attempts were made to correct all the mistakes, some may have been missed, and remained in the body of the document. If questions arise, please contact our department. An electronic signature was used to authenticate this note. Jamal Marroquin may have a reminder for a \"due or due soon\" health maintenance. I have asked that she contact her primary care provider for follow-up on this health maintenance.            Scribed by Luz Marina Bryan (Silvia Lopez), as dictated by Dieter Rodriguez MD   3/1/2023  2:11 PM

## 2023-04-05 NOTE — PROGRESS NOTES
----- Message from Sangeeta West MD sent at 4/5/2023  6:42 AM CDT -----  No yeast or bacterial overgrowth in the vagina. Use triamcinolone cream as directed to external genital area.    Referring Provider: Candace Gifford MD      Lung Cancer Risk Profile:   Age: 61  Gender: Female  Height: 65\"  Weight: 148#    Smoking History:  Smoking Status: past use  # years smoking:   # years quit: 8 (2013), per EMR  Packs/day:   Pack years: 35, per EMR    Patient discussed smoking cessation with PCP: Yes, per provider note    Patient participated in shared decision making process with PCP: Unknown    Patient is currently experiencing symptoms: No    If yes what symptoms:     Co-Morbidities:      Cancer History:      Additional Risk Factors:         Patient's smoking history verified via EMR and provider note. Patient meets LDCT lung cancer screening criteria.        NATALI MauroN, RN, OCN  Lung Health Nurse Navigator

## 2025-05-14 NOTE — PROGRESS NOTES
"Recommendations from today's MTM visit:                                                    MTM (medication therapy management) is a service provided by a clinical pharmacist designed to help you get the most of out of your medicines.   Today we reviewed what your medicines are for, how to know if they are working, that your medicines are safe and how to make your medicine regimen as easy as possible.      I will ask Dr. Case about:  Switching your inhaler or starting a medication called montelukast.  Sending a prescription for naproxen to use as needed (sparingly).  Taking pantoprazole to protect your stomach with NSAID use.    Follow-up: with MTM via phone on:  Thursday Aug 14, 2025   Appt at 11:00 AM  Telephone      It was great speaking with you today.  I value your experience and would be very thankful for your time in providing feedback in our clinic survey. In the next few days, you may receive an email or text message from [employer] with a link to a survey related to your  clinical pharmacist.\"     To schedule another MTM appointment, please call the clinic directly or you may call the MTM scheduling line at [phone] or toll-free at [phone].     My Clinical Pharmacist's contact information:                                                      Please feel free to contact me with any questions or concerns you have.      [place] (Hailie), MaureenD, MPH  Medication Therapy Management Pharmacist    " Sudhakar Burks presents today for   Chief Complaint   Patient presents with    Migraine     Follow up       Is someone accompanying this pt? Yes, Friend    Is the patient using any DME equipment during OV? no    Depression Screening:  3 most recent PHQ Screens 8/4/2021   Little interest or pleasure in doing things Not at all   Feeling down, depressed, irritable, or hopeless Not at all   Total Score PHQ 2 0       Learning Assessment:  Learning Assessment 9/9/2019   PRIMARY LEARNER Patient   PRIMARY LANGUAGE ENGLISH   LEARNER PREFERENCE PRIMARY LISTENING   ANSWERED BY patient   RELATIONSHIP SELF       Abuse Screening:  No flowsheet data found. Fall Risk  No flowsheet data found. Coordination of Care:  1. Have you been to the ER, urgent care clinic since your last visit? Hospitalized since your last visit? no    2. Have you seen or consulted any other health care providers outside of the [address] [place] [address] since your last visit? Include any pap smears or colon screening.  No.

## (undated) DEVICE — PREP SKN CHLRAPRP APL 26ML STR --

## (undated) DEVICE — DRAIN SURG W10XL20CM SIL SMOOTH FLAT 3/4 PERF DBL WRP

## (undated) DEVICE — 3M™ TEGADERM™ TRANSPARENT FILM DRESSING FRAME STYLE, 1626W, 4 IN X 4-3/4 IN (10 CM X 12 CM), 50/CT 4CT/CASE: Brand: 3M™ TEGADERM™

## (undated) DEVICE — SURGIFOAM SPNG SZ 100

## (undated) DEVICE — SUTURE VCRL + ANTIBACT NO 1 CTX 27IN ABSRB BRAID VLT VCP365H

## (undated) DEVICE — BONE VAC

## (undated) DEVICE — 3.0MM PRECISION NEURO (MATCH HEAD)

## (undated) DEVICE — KIT EVAC 0.13IN RECT TB DIA10FR 400CC PVC 3 SPR Y CONN DRN

## (undated) DEVICE — REM POLYHESIVE ADULT PATIENT RETURN ELECTRODE: Brand: VALLEYLAB

## (undated) DEVICE — (D)NDL SPNE 22GX15CM -- DISC BY MFR W/NO SUB

## (undated) DEVICE — DRAIN SURG W7MMXL20CM SIL FULL PERF HUBLESS FLAT RADPQ STRP

## (undated) DEVICE — TRAY MYEL SFTY +

## (undated) DEVICE — RESERVOIR,SUCTION,100CC,SILICONE: Brand: MEDLINE

## (undated) DEVICE — SUTURE MCRYL SZ 4-0 L18IN ABSRB UD L19MM PS-2 3/8 CIR PRIM Y496G

## (undated) DEVICE — DRESSING FOAM DISK DIA1IN H 7MM HYDRPHLC CHG IMPREG IN SL

## (undated) DEVICE — DISPOSABLE HARDY BAYONET INSULATED BIPOLAR FORCEPS: Brand: INTEGRA® JARIT®

## (undated) DEVICE — TOTAL TRAY, 16FR 10ML SIL FOLEY, URN: Brand: MEDLINE

## (undated) DEVICE — SUTURE MCRYL SZ 3-0 L27IN ABSRB UD L24MM PS-1 3/8 CIR PRIM Y936H

## (undated) DEVICE — JACKSON TABLE POSITIONER KIT: Brand: MEDLINE INDUSTRIES, INC.

## (undated) DEVICE — MEDIA CONTRAST 10ML 200MG/ML 41%

## (undated) DEVICE — PACK PROCEDURE SURG LAMINECTOMY SPINE CUST

## (undated) DEVICE — BOWL ASSY BM210 DUAL BLADE DISPOSABLE: Brand: MIDAS REX™

## (undated) DEVICE — GLOVE ORANGE PI 8   MSG9080

## (undated) DEVICE — SPONGE HEMSTAT SURGCEL 2X4IN -- SURGIFOAM

## (undated) DEVICE — INTENDED FOR TISSUE SEPARATION, AND OTHER PROCEDURES THAT REQUIRE A SHARP SURGICAL BLADE TO PUNCTURE OR CUT.: Brand: BARD-PARKER SAFETY BLADES SIZE 20, STERILE

## (undated) DEVICE — WATERPROOF, BACTERIA PROOF DRESSING WITH ABSORBENT SEE THROUGH PAD: Brand: OPSITE POST-OP VISIBLE 15X10CM CTN 20

## (undated) DEVICE — DERMABOND SKIN ADH 0.7ML --

## (undated) DEVICE — SUTURE VCRL SZ 2-0 L18IN ABSRB UD CT-1 L36MM 1/2 CIR J839D

## (undated) DEVICE — BANDAGE ADH W0.75XL3IN UNIV WVN FAB NAT GEN USE STRP N ADH

## (undated) DEVICE — Device

## (undated) DEVICE — SYR 10ML LUER LOK 1/5ML GRAD --

## (undated) DEVICE — SINGLE PORT MANIFOLD: Brand: NEPTUNE 2

## (undated) DEVICE — CATH IV AUTOGRD ORN 14GA 45MM -- INSYTE-N

## (undated) DEVICE — MARKER,SKIN,WI/RULER AND LABELS: Brand: MEDLINE

## (undated) DEVICE — DRAPE C-ARMOUR C-ARM KIT --

## (undated) DEVICE — BANDAGE,GAUZE,BULKEE II,4.5"X4.1YD,STRL: Brand: MEDLINE

## (undated) DEVICE — GARMENT,MEDLINE,DVT,INT,CALF,MED, GEN2: Brand: MEDLINE

## (undated) DEVICE — 1010 S-DRAPE TOWEL DRAPE 10/BX: Brand: STERI-DRAPE™

## (undated) DEVICE — SUTURE STRATAFIX SPRL PDS + SZ 2-0 L6IN ABSRB VLT L36MM SXPP1B409

## (undated) DEVICE — SYSTEM SKIN CLSR 22CM DERMBND PRINEO

## (undated) DEVICE — SUTURE ABSORBABLE MONOFILAMENT 4 0 FS 3 IN STRATAFIX SPRL SXMP2B405

## (undated) DEVICE — SOL IRRIGATION INJ NACL 0.9% 500ML BTL